# Patient Record
Sex: FEMALE | Race: WHITE | NOT HISPANIC OR LATINO | Employment: OTHER | URBAN - METROPOLITAN AREA
[De-identification: names, ages, dates, MRNs, and addresses within clinical notes are randomized per-mention and may not be internally consistent; named-entity substitution may affect disease eponyms.]

---

## 2017-01-17 ENCOUNTER — APPOINTMENT (EMERGENCY)
Dept: RADIOLOGY | Facility: HOSPITAL | Age: 82
DRG: 242 | End: 2017-01-17
Payer: MEDICARE

## 2017-01-17 ENCOUNTER — APPOINTMENT (INPATIENT)
Dept: RADIOLOGY | Facility: HOSPITAL | Age: 82
DRG: 242 | End: 2017-01-17
Payer: MEDICARE

## 2017-01-17 ENCOUNTER — HOSPITAL ENCOUNTER (INPATIENT)
Facility: HOSPITAL | Age: 82
LOS: 7 days | Discharge: NURSING, REHAB OR LONG-TERM CARE | DRG: 242 | End: 2017-01-24
Attending: SURGERY | Admitting: SURGERY
Payer: MEDICARE

## 2017-01-17 ENCOUNTER — APPOINTMENT (INPATIENT)
Dept: NEUROLOGY | Facility: AMBULATORY SURGERY CENTER | Age: 82
DRG: 242 | End: 2017-01-17
Payer: MEDICARE

## 2017-01-17 DIAGNOSIS — R40.4 TRANSIENT ALTERATION OF AWARENESS: ICD-10-CM

## 2017-01-17 DIAGNOSIS — Z95.0 STATUS POST PLACEMENT OF CARDIAC PACEMAKER: ICD-10-CM

## 2017-01-17 DIAGNOSIS — M62.82 RHABDOMYOLYSIS: ICD-10-CM

## 2017-01-17 DIAGNOSIS — R00.1 BRADYCARDIA: ICD-10-CM

## 2017-01-17 DIAGNOSIS — R41.82 ALTERED MENTAL STATUS: Primary | ICD-10-CM

## 2017-01-17 DIAGNOSIS — R77.8 ELEVATED TROPONIN: ICD-10-CM

## 2017-01-17 DIAGNOSIS — R41.82 ALTERED MENTAL STATUS, UNSPECIFIED ALTERED MENTAL STATUS TYPE: ICD-10-CM

## 2017-01-17 PROBLEM — I10 ACCELERATED HYPERTENSION: Status: ACTIVE | Noted: 2017-01-17

## 2017-01-17 LAB
ALBUMIN SERPL BCP-MCNC: 3.2 G/DL (ref 3.5–5)
ALP SERPL-CCNC: 154 U/L (ref 46–116)
ALT SERPL W P-5'-P-CCNC: 37 U/L (ref 12–78)
AMMONIA PLAS-SCNC: 31 UMOL/L (ref 11–35)
ANION GAP SERPL CALCULATED.3IONS-SCNC: 12 MMOL/L (ref 4–13)
APAP SERPL-MCNC: <2 UG/ML (ref 10–30)
APTT PPP: 25 SECONDS (ref 24–36)
ARTERIAL PATENCY WRIST A: YES
AST SERPL W P-5'-P-CCNC: 61 U/L (ref 5–45)
ATRIAL RATE: 75 BPM
BACTERIA UR QL AUTO: ABNORMAL /HPF
BASE EXCESS BLDA CALC-SCNC: -3.6 MMOL/L
BASE EXCESS BLDA CALC-SCNC: -4 MMOL/L (ref -2–3)
BASOPHILS # BLD AUTO: 0.01 THOUSANDS/ΜL (ref 0–0.1)
BASOPHILS # BLD AUTO: 0.02 THOUSANDS/ΜL (ref 0–0.1)
BASOPHILS NFR BLD AUTO: 0 % (ref 0–1)
BASOPHILS NFR BLD AUTO: 0 % (ref 0–1)
BILIRUB SERPL-MCNC: 0.67 MG/DL (ref 0.2–1)
BILIRUB UR QL STRIP: NEGATIVE
BUN SERPL-MCNC: 14 MG/DL (ref 5–25)
CA-I BLD-SCNC: 0.95 MMOL/L (ref 1.12–1.32)
CALCIUM SERPL-MCNC: 8.3 MG/DL (ref 8.3–10.1)
CHLORIDE SERPL-SCNC: 98 MMOL/L (ref 100–108)
CK MB SERPL-MCNC: 1.4 % (ref 0–2.5)
CK MB SERPL-MCNC: 14 NG/ML (ref 0–5)
CK MB SERPL-MCNC: 18.6 NG/ML (ref 0–5)
CK MB SERPL-MCNC: <1 % (ref 0–2.5)
CK SERPL-CCNC: 2710 U/L (ref 26–192)
CK SERPL-CCNC: 997 U/L (ref 26–192)
CLARITY UR: ABNORMAL
CO2 SERPL-SCNC: 23 MMOL/L (ref 21–32)
COLOR UR: YELLOW
CREAT SERPL-MCNC: 0.81 MG/DL (ref 0.6–1.3)
EOSINOPHIL # BLD AUTO: 0 THOUSAND/ΜL (ref 0–0.61)
EOSINOPHIL # BLD AUTO: 0 THOUSAND/ΜL (ref 0–0.61)
EOSINOPHIL NFR BLD AUTO: 0 % (ref 0–6)
EOSINOPHIL NFR BLD AUTO: 0 % (ref 0–6)
ERYTHROCYTE [DISTWIDTH] IN BLOOD BY AUTOMATED COUNT: 13.3 % (ref 11.6–15.1)
ERYTHROCYTE [DISTWIDTH] IN BLOOD BY AUTOMATED COUNT: 13.4 % (ref 11.6–15.1)
ETHANOL SERPL-MCNC: <3 MG/DL (ref 0–3)
GFR SERPL CREATININE-BSD FRML MDRD: >60 ML/MIN/1.73SQ M
GLUCOSE SERPL-MCNC: 103 MG/DL (ref 65–140)
GLUCOSE SERPL-MCNC: 116 MG/DL (ref 65–140)
GLUCOSE UR STRIP-MCNC: NEGATIVE MG/DL
HCO3 BLDA-SCNC: 20 MMOL/L (ref 24–30)
HCO3 BLDA-SCNC: 20.5 MMOL/L (ref 22–28)
HCT VFR BLD AUTO: 34.4 % (ref 34.8–46.1)
HCT VFR BLD AUTO: 35.8 % (ref 34.8–46.1)
HCT VFR BLD CALC: 33 % (ref 34.8–46.1)
HGB BLD-MCNC: 12.1 G/DL (ref 11.5–15.4)
HGB BLD-MCNC: 12.5 G/DL (ref 11.5–15.4)
HGB BLDA-MCNC: 11.2 G/DL (ref 11.5–15.4)
HGB UR QL STRIP.AUTO: ABNORMAL
HOLD SPECIMEN: NORMAL
HOROWITZ INDEX BLDA+IHG-RTO: 50 MM[HG]
INR PPP: 1.02 (ref 0.86–1.16)
KETONES UR STRIP-MCNC: ABNORMAL MG/DL
LACTATE SERPL-SCNC: 1.5 MMOL/L (ref 0.5–2)
LACTATE SERPL-SCNC: 2.1 MMOL/L (ref 0.5–2)
LEUKOCYTE ESTERASE UR QL STRIP: NEGATIVE
LYMPHOCYTES # BLD AUTO: 0.75 THOUSANDS/ΜL (ref 0.6–4.47)
LYMPHOCYTES # BLD AUTO: 1.07 THOUSANDS/ΜL (ref 0.6–4.47)
LYMPHOCYTES NFR BLD AUTO: 10 % (ref 14–44)
LYMPHOCYTES NFR BLD AUTO: 7 % (ref 14–44)
MCH RBC QN AUTO: 31.9 PG (ref 26.8–34.3)
MCH RBC QN AUTO: 31.9 PG (ref 26.8–34.3)
MCHC RBC AUTO-ENTMCNC: 34.9 G/DL (ref 31.4–37.4)
MCHC RBC AUTO-ENTMCNC: 35.2 G/DL (ref 31.4–37.4)
MCV RBC AUTO: 91 FL (ref 82–98)
MCV RBC AUTO: 91 FL (ref 82–98)
MONOCYTES # BLD AUTO: 0.72 THOUSAND/ΜL (ref 0.17–1.22)
MONOCYTES # BLD AUTO: 1.17 THOUSAND/ΜL (ref 0.17–1.22)
MONOCYTES NFR BLD AUTO: 10 % (ref 4–12)
MONOCYTES NFR BLD AUTO: 7 % (ref 4–12)
NEUTROPHILS # BLD AUTO: 8.66 THOUSANDS/ΜL (ref 1.85–7.62)
NEUTROPHILS # BLD AUTO: 8.94 THOUSANDS/ΜL (ref 1.85–7.62)
NEUTS SEG NFR BLD AUTO: 80 % (ref 43–75)
NEUTS SEG NFR BLD AUTO: 86 % (ref 43–75)
NITRITE UR QL STRIP: NEGATIVE
NON-SQ EPI CELLS URNS QL MICRO: ABNORMAL /HPF
NRBC BLD AUTO-RTO: 0 /100 WBCS
NRBC BLD AUTO-RTO: 0 /100 WBCS
O2 CT BLDA-SCNC: 18.8 ML/DL (ref 16–23)
OXYHGB MFR BLDA: 98 % (ref 94–97)
P AXIS: 60 DEGREES
PCO2 BLD: 21 MMOL/L (ref 21–32)
PCO2 BLD: 34 MM HG (ref 42–50)
PCO2 BLDA: 34.4 MM HG (ref 36–44)
PEEP RESPIRATORY: 5 CM[H2O]
PH BLD: 7.38 [PH] (ref 7.3–7.4)
PH BLDA: 7.39 [PH] (ref 7.35–7.45)
PH UR STRIP.AUTO: 7.5 [PH] (ref 4.5–8)
PLATELET # BLD AUTO: 315 THOUSANDS/UL (ref 149–390)
PLATELET # BLD AUTO: 344 THOUSANDS/UL (ref 149–390)
PLATELET # BLD AUTO: 347 THOUSANDS/UL (ref 149–390)
PMV BLD AUTO: 8.8 FL (ref 8.9–12.7)
PMV BLD AUTO: 9.1 FL (ref 8.9–12.7)
PMV BLD AUTO: 9.5 FL (ref 8.9–12.7)
PO2 BLD: 44 MM HG (ref 35–45)
PO2 BLDA: 141.5 MM HG (ref 75–129)
POTASSIUM BLD-SCNC: 3.9 MMOL/L (ref 3.5–5.3)
POTASSIUM SERPL-SCNC: 3.4 MMOL/L (ref 3.5–5.3)
PR INTERVAL: 202 MS
PROLACTIN SERPL-MCNC: 33.4 NG/ML
PROT SERPL-MCNC: 6.9 G/DL (ref 6.4–8.2)
PROT UR STRIP-MCNC: ABNORMAL MG/DL
PROTHROMBIN TIME: 13.5 SECONDS (ref 12–14.3)
QRS AXIS: 77 DEGREES
QRSD INTERVAL: 90 MS
QT INTERVAL: 390 MS
QTC INTERVAL: 435 MS
RBC # BLD AUTO: 3.79 MILLION/UL (ref 3.81–5.12)
RBC # BLD AUTO: 3.92 MILLION/UL (ref 3.81–5.12)
RBC #/AREA URNS AUTO: ABNORMAL /HPF
SALICYLATES SERPL-MCNC: <3 MG/DL (ref 3–20)
SAO2 % BLD FROM PO2: 79 % (ref 95–98)
SODIUM BLD-SCNC: 133 MMOL/L (ref 136–145)
SODIUM SERPL-SCNC: 133 MMOL/L (ref 136–145)
SP GR UR STRIP.AUTO: 1.01 (ref 1–1.03)
SPECIMEN SOURCE: ABNORMAL
SPECIMEN SOURCE: ABNORMAL
SPECIMEN SOURCE: NORMAL
T WAVE AXIS: 45 DEGREES
TROPONIN I BLD-MCNC: 0.04 NG/ML (ref 0–0.08)
TROPONIN I SERPL-MCNC: 0.05 NG/ML
TROPONIN I SERPL-MCNC: 0.17 NG/ML
TSH SERPL DL<=0.05 MIU/L-ACNC: 3.49 UIU/ML (ref 0.36–3.74)
UROBILINOGEN UR QL STRIP.AUTO: 0.2 E.U./DL
VENT AC: 12
VENT- AC: AC
VENTRICULAR RATE: 75 BPM
VT SETTING VENT: 400 ML
WBC # BLD AUTO: 10.17 THOUSAND/UL (ref 4.31–10.16)
WBC # BLD AUTO: 11.22 THOUSAND/UL (ref 4.31–10.16)
WBC #/AREA URNS AUTO: ABNORMAL /HPF

## 2017-01-17 PROCEDURE — 94002 VENT MGMT INPAT INIT DAY: CPT

## 2017-01-17 PROCEDURE — C9113 INJ PANTOPRAZOLE SODIUM, VIA: HCPCS | Performed by: PHYSICIAN ASSISTANT

## 2017-01-17 PROCEDURE — 83605 ASSAY OF LACTIC ACID: CPT | Performed by: EMERGENCY MEDICINE

## 2017-01-17 PROCEDURE — 83605 ASSAY OF LACTIC ACID: CPT | Performed by: PHYSICIAN ASSISTANT

## 2017-01-17 PROCEDURE — 85025 COMPLETE CBC W/AUTO DIFF WBC: CPT | Performed by: SURGERY

## 2017-01-17 PROCEDURE — 36415 COLL VENOUS BLD VENIPUNCTURE: CPT | Performed by: EMERGENCY MEDICINE

## 2017-01-17 PROCEDURE — 84484 ASSAY OF TROPONIN QUANT: CPT | Performed by: SURGERY

## 2017-01-17 PROCEDURE — 72125 CT NECK SPINE W/O DYE: CPT

## 2017-01-17 PROCEDURE — 80053 COMPREHEN METABOLIC PANEL: CPT | Performed by: PHYSICIAN ASSISTANT

## 2017-01-17 PROCEDURE — 87086 URINE CULTURE/COLONY COUNT: CPT | Performed by: PHYSICIAN ASSISTANT

## 2017-01-17 PROCEDURE — 82803 BLOOD GASES ANY COMBINATION: CPT

## 2017-01-17 PROCEDURE — 84443 ASSAY THYROID STIM HORMONE: CPT | Performed by: PHYSICIAN ASSISTANT

## 2017-01-17 PROCEDURE — 84295 ASSAY OF SERUM SODIUM: CPT

## 2017-01-17 PROCEDURE — 73560 X-RAY EXAM OF KNEE 1 OR 2: CPT

## 2017-01-17 PROCEDURE — 82553 CREATINE MB FRACTION: CPT | Performed by: PHYSICIAN ASSISTANT

## 2017-01-17 PROCEDURE — 82947 ASSAY GLUCOSE BLOOD QUANT: CPT

## 2017-01-17 PROCEDURE — 80329 ANALGESICS NON-OPIOID 1 OR 2: CPT | Performed by: PHYSICIAN ASSISTANT

## 2017-01-17 PROCEDURE — 85049 AUTOMATED PLATELET COUNT: CPT | Performed by: EMERGENCY MEDICINE

## 2017-01-17 PROCEDURE — 73020 X-RAY EXAM OF SHOULDER: CPT

## 2017-01-17 PROCEDURE — 82550 ASSAY OF CK (CPK): CPT | Performed by: PHYSICIAN ASSISTANT

## 2017-01-17 PROCEDURE — 82330 ASSAY OF CALCIUM: CPT

## 2017-01-17 PROCEDURE — 93005 ELECTROCARDIOGRAM TRACING: CPT | Performed by: SURGERY

## 2017-01-17 PROCEDURE — 82140 ASSAY OF AMMONIA: CPT | Performed by: PHYSICIAN ASSISTANT

## 2017-01-17 PROCEDURE — 84484 ASSAY OF TROPONIN QUANT: CPT

## 2017-01-17 PROCEDURE — 84132 ASSAY OF SERUM POTASSIUM: CPT

## 2017-01-17 PROCEDURE — 84146 ASSAY OF PROLACTIN: CPT | Performed by: PHYSICIAN ASSISTANT

## 2017-01-17 PROCEDURE — 94760 N-INVAS EAR/PLS OXIMETRY 1: CPT

## 2017-01-17 PROCEDURE — 85014 HEMATOCRIT: CPT

## 2017-01-17 PROCEDURE — G0390 TRAUMA RESPONS W/HOSP CRITI: HCPCS

## 2017-01-17 PROCEDURE — 0BH17EZ INSERTION OF ENDOTRACHEAL AIRWAY INTO TRACHEA, VIA NATURAL OR ARTIFICIAL OPENING: ICD-10-PCS | Performed by: EMERGENCY MEDICINE

## 2017-01-17 PROCEDURE — 80320 DRUG SCREEN QUANTALCOHOLS: CPT | Performed by: PHYSICIAN ASSISTANT

## 2017-01-17 PROCEDURE — 99291 CRITICAL CARE FIRST HOUR: CPT

## 2017-01-17 PROCEDURE — 87040 BLOOD CULTURE FOR BACTERIA: CPT | Performed by: PHYSICIAN ASSISTANT

## 2017-01-17 PROCEDURE — 82550 ASSAY OF CK (CPK): CPT | Performed by: SURGERY

## 2017-01-17 PROCEDURE — 85610 PROTHROMBIN TIME: CPT | Performed by: SURGERY

## 2017-01-17 PROCEDURE — 81001 URINALYSIS AUTO W/SCOPE: CPT | Performed by: EMERGENCY MEDICINE

## 2017-01-17 PROCEDURE — 96374 THER/PROPH/DIAG INJ IV PUSH: CPT

## 2017-01-17 PROCEDURE — 95951 HB EEG MONITORING/VIDEORECORD: CPT

## 2017-01-17 PROCEDURE — 70450 CT HEAD/BRAIN W/O DYE: CPT

## 2017-01-17 PROCEDURE — 85025 COMPLETE CBC W/AUTO DIFF WBC: CPT | Performed by: PHYSICIAN ASSISTANT

## 2017-01-17 PROCEDURE — 82805 BLOOD GASES W/O2 SATURATION: CPT | Performed by: PHYSICIAN ASSISTANT

## 2017-01-17 PROCEDURE — 93005 ELECTROCARDIOGRAM TRACING: CPT | Performed by: EMERGENCY MEDICINE

## 2017-01-17 PROCEDURE — 71010 HB CHEST X-RAY 1 VIEW FRONTAL: CPT

## 2017-01-17 PROCEDURE — 84484 ASSAY OF TROPONIN QUANT: CPT | Performed by: PHYSICIAN ASSISTANT

## 2017-01-17 PROCEDURE — 85730 THROMBOPLASTIN TIME PARTIAL: CPT | Performed by: SURGERY

## 2017-01-17 PROCEDURE — 5A1935Z RESPIRATORY VENTILATION, LESS THAN 24 CONSECUTIVE HOURS: ICD-10-PCS | Performed by: EMERGENCY MEDICINE

## 2017-01-17 PROCEDURE — 82553 CREATINE MB FRACTION: CPT | Performed by: SURGERY

## 2017-01-17 RX ORDER — LABETALOL HYDROCHLORIDE 5 MG/ML
10 INJECTION, SOLUTION INTRAVENOUS ONCE
Status: COMPLETED | OUTPATIENT
Start: 2017-01-17 | End: 2017-01-17

## 2017-01-17 RX ORDER — PROPOFOL 10 MG/ML
5-50 INJECTION, EMULSION INTRAVENOUS
Status: DISCONTINUED | OUTPATIENT
Start: 2017-01-17 | End: 2017-01-17

## 2017-01-17 RX ORDER — SODIUM CHLORIDE 9 MG/ML
100 INJECTION, SOLUTION INTRAVENOUS CONTINUOUS
Status: DISCONTINUED | OUTPATIENT
Start: 2017-01-17 | End: 2017-01-18

## 2017-01-17 RX ORDER — SODIUM CHLORIDE, SODIUM GLUCONATE, SODIUM ACETATE, POTASSIUM CHLORIDE, MAGNESIUM CHLORIDE, SODIUM PHOSPHATE, DIBASIC, AND POTASSIUM PHOSPHATE .53; .5; .37; .037; .03; .012; .00082 G/100ML; G/100ML; G/100ML; G/100ML; G/100ML; G/100ML; G/100ML
500 INJECTION, SOLUTION INTRAVENOUS ONCE
Status: COMPLETED | OUTPATIENT
Start: 2017-01-17 | End: 2017-01-17

## 2017-01-17 RX ORDER — SODIUM CHLORIDE 3 G/100ML
250 INJECTION, SOLUTION INTRAVENOUS ONCE
Status: DISCONTINUED | OUTPATIENT
Start: 2017-01-17 | End: 2017-01-17

## 2017-01-17 RX ORDER — FENTANYL CITRATE 50 UG/ML
50 INJECTION, SOLUTION INTRAMUSCULAR; INTRAVENOUS ONCE
Status: COMPLETED | OUTPATIENT
Start: 2017-01-17 | End: 2017-01-17

## 2017-01-17 RX ORDER — CHLORHEXIDINE GLUCONATE 0.12 MG/ML
15 RINSE ORAL EVERY 12 HOURS SCHEDULED
Status: DISCONTINUED | OUTPATIENT
Start: 2017-01-17 | End: 2017-01-18

## 2017-01-17 RX ORDER — 3% SODIUM CHLORIDE 3 G/100ML
30 INJECTION, SOLUTION INTRAVENOUS CONTINUOUS
Status: DISCONTINUED | OUTPATIENT
Start: 2017-01-17 | End: 2017-01-17

## 2017-01-17 RX ORDER — HYDRALAZINE HYDROCHLORIDE 20 MG/ML
10 INJECTION INTRAMUSCULAR; INTRAVENOUS EVERY 6 HOURS PRN
Status: DISCONTINUED | OUTPATIENT
Start: 2017-01-17 | End: 2017-01-24 | Stop reason: HOSPADM

## 2017-01-17 RX ORDER — PROPOFOL 10 MG/ML
INJECTION, EMULSION INTRAVENOUS
Status: COMPLETED | OUTPATIENT
Start: 2017-01-17 | End: 2017-01-17

## 2017-01-17 RX ORDER — PANTOPRAZOLE SODIUM 40 MG/1
40 INJECTION, POWDER, FOR SOLUTION INTRAVENOUS DAILY
Status: DISCONTINUED | OUTPATIENT
Start: 2017-01-17 | End: 2017-01-19

## 2017-01-17 RX ORDER — ROCURONIUM BROMIDE 10 MG/ML
60 INJECTION, SOLUTION INTRAVENOUS ONCE
Status: COMPLETED | OUTPATIENT
Start: 2017-01-17 | End: 2017-01-17

## 2017-01-17 RX ORDER — POLYVINYL ALCOHOL 14 MG/ML
1 SOLUTION/ DROPS OPHTHALMIC
Status: DISCONTINUED | OUTPATIENT
Start: 2017-01-17 | End: 2017-01-24 | Stop reason: HOSPADM

## 2017-01-17 RX ORDER — HEPARIN SODIUM 5000 [USP'U]/ML
5000 INJECTION, SOLUTION INTRAVENOUS; SUBCUTANEOUS EVERY 8 HOURS SCHEDULED
Status: COMPLETED | OUTPATIENT
Start: 2017-01-18 | End: 2017-01-18

## 2017-01-17 RX ORDER — POTASSIUM CHLORIDE 14.9 MG/ML
20 INJECTION INTRAVENOUS ONCE
Status: COMPLETED | OUTPATIENT
Start: 2017-01-17 | End: 2017-01-17

## 2017-01-17 RX ORDER — ETOMIDATE 2 MG/ML
INJECTION INTRAVENOUS CODE/TRAUMA/SEDATION MEDICATION
Status: COMPLETED | OUTPATIENT
Start: 2017-01-17 | End: 2017-01-17

## 2017-01-17 RX ORDER — POTASSIUM CHLORIDE 14.9 MG/ML
20 INJECTION INTRAVENOUS ONCE
Status: COMPLETED | OUTPATIENT
Start: 2017-01-17 | End: 2017-01-18

## 2017-01-17 RX ADMIN — POTASSIUM CHLORIDE 20 MEQ: 200 INJECTION, SOLUTION INTRAVENOUS at 21:43

## 2017-01-17 RX ADMIN — POTASSIUM CHLORIDE 20 MEQ: 200 INJECTION, SOLUTION INTRAVENOUS at 23:08

## 2017-01-17 RX ADMIN — LABETALOL HYDROCHLORIDE 10 MG: 5 INJECTION, SOLUTION INTRAVENOUS at 11:53

## 2017-01-17 RX ADMIN — ROCURONIUM BROMIDE 60 MG: 10 INJECTION, SOLUTION INTRAVENOUS at 11:03

## 2017-01-17 RX ADMIN — SODIUM CHLORIDE 75 ML/HR: 0.9 INJECTION, SOLUTION INTRAVENOUS at 19:23

## 2017-01-17 RX ADMIN — FENTANYL CITRATE 50 MCG: 50 INJECTION INTRAMUSCULAR; INTRAVENOUS at 11:32

## 2017-01-17 RX ADMIN — DEXMEDETOMIDINE HYDROCHLORIDE 0.2 MCG/KG/HR: 100 INJECTION, SOLUTION INTRAVENOUS at 20:54

## 2017-01-17 RX ADMIN — PROPOFOL 10 MCG/KG/MIN: 10 INJECTION, EMULSION INTRAVENOUS at 11:53

## 2017-01-17 RX ADMIN — SODIUM CHLORIDE, SODIUM GLUCONATE, SODIUM ACETATE, POTASSIUM CHLORIDE AND MAGNESIUM CHLORIDE 500 ML: 526; 502; 368; 37; 30 INJECTION, SOLUTION INTRAVENOUS at 17:53

## 2017-01-17 RX ADMIN — ETOMIDATE 18 MG: 2 INJECTION, SOLUTION INTRAVENOUS at 11:00

## 2017-01-17 RX ADMIN — PROPOFOL 10 MCG/KG/MIN: 10 INJECTION, EMULSION INTRAVENOUS at 12:15

## 2017-01-17 RX ADMIN — CHLORHEXIDINE GLUCONATE 15 ML: 1.2 RINSE ORAL at 20:17

## 2017-01-17 RX ADMIN — PROPOFOL 5 MCG/KG/MIN: 10 INJECTION, EMULSION INTRAVENOUS at 11:11

## 2017-01-17 RX ADMIN — PANTOPRAZOLE SODIUM 40 MG: 40 INJECTION, POWDER, FOR SOLUTION INTRAVENOUS at 16:43

## 2017-01-18 ENCOUNTER — APPOINTMENT (INPATIENT)
Dept: RADIOLOGY | Facility: HOSPITAL | Age: 82
DRG: 242 | End: 2017-01-18
Payer: MEDICARE

## 2017-01-18 ENCOUNTER — GENERIC CONVERSION - ENCOUNTER (OUTPATIENT)
Dept: OTHER | Facility: OTHER | Age: 82
End: 2017-01-18

## 2017-01-18 ENCOUNTER — APPOINTMENT (INPATIENT)
Dept: NEUROLOGY | Facility: AMBULATORY SURGERY CENTER | Age: 82
DRG: 242 | End: 2017-01-18
Payer: MEDICARE

## 2017-01-18 ENCOUNTER — ANESTHESIA EVENT (INPATIENT)
Dept: NON INVASIVE DIAGNOSTICS | Facility: HOSPITAL | Age: 82
DRG: 242 | End: 2017-01-18
Payer: MEDICARE

## 2017-01-18 PROBLEM — R00.1 BRADYCARDIA: Status: ACTIVE | Noted: 2017-01-18

## 2017-01-18 PROBLEM — R41.82 ALTERED MENTAL STATUS: Status: RESOLVED | Noted: 2017-01-17 | Resolved: 2017-01-18

## 2017-01-18 LAB
ANION GAP SERPL CALCULATED.3IONS-SCNC: 9 MMOL/L (ref 4–13)
ANION GAP SERPL CALCULATED.3IONS-SCNC: 9 MMOL/L (ref 4–13)
BACTERIA UR CULT: NORMAL
BASOPHILS # BLD AUTO: 0.01 THOUSANDS/ΜL (ref 0–0.1)
BASOPHILS NFR BLD AUTO: 0 % (ref 0–1)
BUN SERPL-MCNC: 12 MG/DL (ref 5–25)
BUN SERPL-MCNC: 13 MG/DL (ref 5–25)
CALCIUM SERPL-MCNC: 8 MG/DL (ref 8.3–10.1)
CALCIUM SERPL-MCNC: 8.1 MG/DL (ref 8.3–10.1)
CHLORIDE SERPL-SCNC: 102 MMOL/L (ref 100–108)
CHLORIDE SERPL-SCNC: 103 MMOL/L (ref 100–108)
CK MB SERPL-MCNC: 17.3 NG/ML (ref 0–5)
CK MB SERPL-MCNC: 19.8 NG/ML (ref 0–5)
CK MB SERPL-MCNC: <1 % (ref 0–2.5)
CK MB SERPL-MCNC: <1 % (ref 0–2.5)
CK SERPL-CCNC: 2759 U/L (ref 26–192)
CK SERPL-CCNC: 2814 U/L (ref 26–192)
CO2 SERPL-SCNC: 22 MMOL/L (ref 21–32)
CO2 SERPL-SCNC: 24 MMOL/L (ref 21–32)
CREAT SERPL-MCNC: 0.62 MG/DL (ref 0.6–1.3)
CREAT SERPL-MCNC: 0.72 MG/DL (ref 0.6–1.3)
EOSINOPHIL # BLD AUTO: 0.01 THOUSAND/ΜL (ref 0–0.61)
EOSINOPHIL NFR BLD AUTO: 0 % (ref 0–6)
ERYTHROCYTE [DISTWIDTH] IN BLOOD BY AUTOMATED COUNT: 13.7 % (ref 11.6–15.1)
GFR SERPL CREATININE-BSD FRML MDRD: >60 ML/MIN/1.73SQ M
GFR SERPL CREATININE-BSD FRML MDRD: >60 ML/MIN/1.73SQ M
GLUCOSE SERPL-MCNC: 104 MG/DL (ref 65–140)
GLUCOSE SERPL-MCNC: 91 MG/DL (ref 65–140)
HCT VFR BLD AUTO: 31.9 % (ref 34.8–46.1)
HGB BLD-MCNC: 11 G/DL (ref 11.5–15.4)
LACTATE SERPL-SCNC: 1.1 MMOL/L (ref 0.5–2)
LYMPHOCYTES # BLD AUTO: 1.12 THOUSANDS/ΜL (ref 0.6–4.47)
LYMPHOCYTES NFR BLD AUTO: 11 % (ref 14–44)
MAGNESIUM SERPL-MCNC: 3.5 MG/DL (ref 1.6–2.6)
MCH RBC QN AUTO: 31.8 PG (ref 26.8–34.3)
MCHC RBC AUTO-ENTMCNC: 34.5 G/DL (ref 31.4–37.4)
MCV RBC AUTO: 92 FL (ref 82–98)
MONOCYTES # BLD AUTO: 1.24 THOUSAND/ΜL (ref 0.17–1.22)
MONOCYTES NFR BLD AUTO: 12 % (ref 4–12)
NEUTROPHILS # BLD AUTO: 8.18 THOUSANDS/ΜL (ref 1.85–7.62)
NEUTS SEG NFR BLD AUTO: 77 % (ref 43–75)
NRBC BLD AUTO-RTO: 0 /100 WBCS
PHOSPHATE SERPL-MCNC: 2.3 MG/DL (ref 2.3–4.1)
PLATELET # BLD AUTO: 300 THOUSANDS/UL (ref 149–390)
PMV BLD AUTO: 9.3 FL (ref 8.9–12.7)
POTASSIUM SERPL-SCNC: 3.5 MMOL/L (ref 3.5–5.3)
POTASSIUM SERPL-SCNC: 4 MMOL/L (ref 3.5–5.3)
RBC # BLD AUTO: 3.46 MILLION/UL (ref 3.81–5.12)
SODIUM SERPL-SCNC: 134 MMOL/L (ref 136–145)
SODIUM SERPL-SCNC: 135 MMOL/L (ref 136–145)
TROPONIN I SERPL-MCNC: 0.13 NG/ML
WBC # BLD AUTO: 10.58 THOUSAND/UL (ref 4.31–10.16)

## 2017-01-18 PROCEDURE — 82553 CREATINE MB FRACTION: CPT | Performed by: EMERGENCY MEDICINE

## 2017-01-18 PROCEDURE — 73130 X-RAY EXAM OF HAND: CPT

## 2017-01-18 PROCEDURE — 95951 HB EEG MONITORING/VIDEORECORD: CPT

## 2017-01-18 PROCEDURE — 84484 ASSAY OF TROPONIN QUANT: CPT | Performed by: PHYSICIAN ASSISTANT

## 2017-01-18 PROCEDURE — 83605 ASSAY OF LACTIC ACID: CPT | Performed by: PHYSICIAN ASSISTANT

## 2017-01-18 PROCEDURE — 93306 TTE W/DOPPLER COMPLETE: CPT

## 2017-01-18 PROCEDURE — 84100 ASSAY OF PHOSPHORUS: CPT | Performed by: EMERGENCY MEDICINE

## 2017-01-18 PROCEDURE — 82550 ASSAY OF CK (CPK): CPT | Performed by: PHYSICIAN ASSISTANT

## 2017-01-18 PROCEDURE — C9113 INJ PANTOPRAZOLE SODIUM, VIA: HCPCS | Performed by: PHYSICIAN ASSISTANT

## 2017-01-18 PROCEDURE — 83735 ASSAY OF MAGNESIUM: CPT | Performed by: EMERGENCY MEDICINE

## 2017-01-18 PROCEDURE — 92610 EVALUATE SWALLOWING FUNCTION: CPT

## 2017-01-18 PROCEDURE — 85025 COMPLETE CBC W/AUTO DIFF WBC: CPT | Performed by: EMERGENCY MEDICINE

## 2017-01-18 PROCEDURE — 71010 HB CHEST X-RAY 1 VIEW FRONTAL (PORTABLE): CPT

## 2017-01-18 PROCEDURE — 82550 ASSAY OF CK (CPK): CPT | Performed by: EMERGENCY MEDICINE

## 2017-01-18 PROCEDURE — 80048 BASIC METABOLIC PNL TOTAL CA: CPT | Performed by: EMERGENCY MEDICINE

## 2017-01-18 PROCEDURE — 94760 N-INVAS EAR/PLS OXIMETRY 1: CPT

## 2017-01-18 PROCEDURE — 82553 CREATINE MB FRACTION: CPT | Performed by: PHYSICIAN ASSISTANT

## 2017-01-18 RX ORDER — MAGNESIUM SULFATE HEPTAHYDRATE 40 MG/ML
2 INJECTION, SOLUTION INTRAVENOUS ONCE
Status: COMPLETED | OUTPATIENT
Start: 2017-01-18 | End: 2017-01-18

## 2017-01-18 RX ORDER — POTASSIUM CHLORIDE 14.9 MG/ML
20 INJECTION INTRAVENOUS ONCE
Status: COMPLETED | OUTPATIENT
Start: 2017-01-18 | End: 2017-01-18

## 2017-01-18 RX ORDER — POTASSIUM CHLORIDE 20 MEQ/1
40 TABLET, EXTENDED RELEASE ORAL ONCE
Status: COMPLETED | OUTPATIENT
Start: 2017-01-18 | End: 2017-01-18

## 2017-01-18 RX ORDER — SODIUM CHLORIDE 9 MG/ML
100 INJECTION, SOLUTION INTRAVENOUS CONTINUOUS
Status: DISCONTINUED | OUTPATIENT
Start: 2017-01-18 | End: 2017-01-19

## 2017-01-18 RX ORDER — SODIUM CHLORIDE, SODIUM GLUCONATE, SODIUM ACETATE, POTASSIUM CHLORIDE, MAGNESIUM CHLORIDE, SODIUM PHOSPHATE, DIBASIC, AND POTASSIUM PHOSPHATE .53; .5; .37; .037; .03; .012; .00082 G/100ML; G/100ML; G/100ML; G/100ML; G/100ML; G/100ML; G/100ML
100 INJECTION, SOLUTION INTRAVENOUS CONTINUOUS
Status: DISCONTINUED | OUTPATIENT
Start: 2017-01-18 | End: 2017-01-18

## 2017-01-18 RX ORDER — ACETAMINOPHEN 325 MG/1
650 TABLET ORAL EVERY 6 HOURS PRN
Status: DISCONTINUED | OUTPATIENT
Start: 2017-01-18 | End: 2017-01-18

## 2017-01-18 RX ORDER — AMLODIPINE BESYLATE 5 MG/1
5 TABLET ORAL DAILY
COMMUNITY
End: 2017-06-16

## 2017-01-18 RX ORDER — ACETAMINOPHEN 325 MG/1
650 TABLET ORAL EVERY 6 HOURS PRN
Status: DISCONTINUED | OUTPATIENT
Start: 2017-01-18 | End: 2017-01-24 | Stop reason: HOSPADM

## 2017-01-18 RX ADMIN — POTASSIUM CHLORIDE 20 MEQ: 200 INJECTION, SOLUTION INTRAVENOUS at 17:24

## 2017-01-18 RX ADMIN — SODIUM CHLORIDE 500 ML: 0.9 INJECTION, SOLUTION INTRAVENOUS at 09:05

## 2017-01-18 RX ADMIN — HYDRALAZINE HYDROCHLORIDE 10 MG: 20 INJECTION INTRAMUSCULAR; INTRAVENOUS at 15:15

## 2017-01-18 RX ADMIN — POTASSIUM CHLORIDE 40 MEQ: 1500 TABLET, EXTENDED RELEASE ORAL at 17:24

## 2017-01-18 RX ADMIN — MAGNESIUM SULFATE HEPTAHYDRATE 2 G: 40 INJECTION, SOLUTION INTRAVENOUS at 00:44

## 2017-01-18 RX ADMIN — HEPARIN SODIUM 5000 UNITS: 5000 INJECTION, SOLUTION INTRAVENOUS; SUBCUTANEOUS at 22:20

## 2017-01-18 RX ADMIN — HEPARIN SODIUM 5000 UNITS: 5000 INJECTION, SOLUTION INTRAVENOUS; SUBCUTANEOUS at 05:59

## 2017-01-18 RX ADMIN — SODIUM CHLORIDE 100 ML/HR: 0.9 INJECTION, SOLUTION INTRAVENOUS at 09:11

## 2017-01-18 RX ADMIN — DEXMEDETOMIDINE HYDROCHLORIDE 0.2 MCG/KG/HR: 100 INJECTION, SOLUTION INTRAVENOUS at 05:52

## 2017-01-18 RX ADMIN — HEPARIN SODIUM 5000 UNITS: 5000 INJECTION, SOLUTION INTRAVENOUS; SUBCUTANEOUS at 15:06

## 2017-01-18 RX ADMIN — SODIUM CHLORIDE 100 ML/HR: 0.9 INJECTION, SOLUTION INTRAVENOUS at 05:52

## 2017-01-18 RX ADMIN — PANTOPRAZOLE SODIUM 40 MG: 40 INJECTION, POWDER, FOR SOLUTION INTRAVENOUS at 09:37

## 2017-01-18 RX ADMIN — SODIUM CHLORIDE 100 ML/HR: 0.9 INJECTION, SOLUTION INTRAVENOUS at 19:07

## 2017-01-19 ENCOUNTER — ANESTHESIA (INPATIENT)
Dept: NON INVASIVE DIAGNOSTICS | Facility: HOSPITAL | Age: 82
DRG: 242 | End: 2017-01-19
Payer: MEDICARE

## 2017-01-19 ENCOUNTER — APPOINTMENT (INPATIENT)
Dept: NON INVASIVE DIAGNOSTICS | Facility: HOSPITAL | Age: 82
DRG: 242 | End: 2017-01-19
Attending: INTERNAL MEDICINE
Payer: MEDICARE

## 2017-01-19 ENCOUNTER — APPOINTMENT (INPATIENT)
Dept: RADIOLOGY | Facility: HOSPITAL | Age: 82
DRG: 242 | End: 2017-01-19
Attending: INTERNAL MEDICINE
Payer: MEDICARE

## 2017-01-19 ENCOUNTER — GENERIC CONVERSION - ENCOUNTER (OUTPATIENT)
Dept: OTHER | Facility: OTHER | Age: 82
End: 2017-01-19

## 2017-01-19 PROBLEM — R41.82 ALTERED MENTAL STATUS: Status: RESOLVED | Noted: 2017-01-17 | Resolved: 2017-01-19

## 2017-01-19 PROBLEM — Z95.0 STATUS POST PLACEMENT OF CARDIAC PACEMAKER: Status: ACTIVE | Noted: 2017-01-19

## 2017-01-19 PROBLEM — R00.1 BRADYCARDIA: Status: RESOLVED | Noted: 2017-01-18 | Resolved: 2017-01-19

## 2017-01-19 LAB
ALBUMIN SERPL BCP-MCNC: 2.6 G/DL (ref 3.5–5)
ALP SERPL-CCNC: 119 U/L (ref 46–116)
ALT SERPL W P-5'-P-CCNC: 46 U/L (ref 12–78)
ANION GAP SERPL CALCULATED.3IONS-SCNC: 10 MMOL/L (ref 4–13)
AST SERPL W P-5'-P-CCNC: 102 U/L (ref 5–45)
BASOPHILS # BLD AUTO: 0.02 THOUSANDS/ΜL (ref 0–0.1)
BASOPHILS NFR BLD AUTO: 0 % (ref 0–1)
BILIRUB SERPL-MCNC: 0.97 MG/DL (ref 0.2–1)
BUN SERPL-MCNC: 10 MG/DL (ref 5–25)
CALCIUM SERPL-MCNC: 8.1 MG/DL (ref 8.3–10.1)
CHLORIDE SERPL-SCNC: 107 MMOL/L (ref 100–108)
CK MB SERPL-MCNC: 9.6 NG/ML (ref 0–5)
CK MB SERPL-MCNC: <1 % (ref 0–2.5)
CK SERPL-CCNC: 1741 U/L (ref 26–192)
CO2 SERPL-SCNC: 20 MMOL/L (ref 21–32)
CREAT SERPL-MCNC: 0.56 MG/DL (ref 0.6–1.3)
EOSINOPHIL # BLD AUTO: 0.01 THOUSAND/ΜL (ref 0–0.61)
EOSINOPHIL NFR BLD AUTO: 0 % (ref 0–6)
ERYTHROCYTE [DISTWIDTH] IN BLOOD BY AUTOMATED COUNT: 13.8 % (ref 11.6–15.1)
GFR SERPL CREATININE-BSD FRML MDRD: >60 ML/MIN/1.73SQ M
GLUCOSE SERPL-MCNC: 96 MG/DL (ref 65–140)
HCT VFR BLD AUTO: 33.5 % (ref 34.8–46.1)
HGB BLD-MCNC: 11.4 G/DL (ref 11.5–15.4)
LYMPHOCYTES # BLD AUTO: 0.88 THOUSANDS/ΜL (ref 0.6–4.47)
LYMPHOCYTES NFR BLD AUTO: 9 % (ref 14–44)
MAGNESIUM SERPL-MCNC: 2.4 MG/DL (ref 1.6–2.6)
MCH RBC QN AUTO: 31.4 PG (ref 26.8–34.3)
MCHC RBC AUTO-ENTMCNC: 34 G/DL (ref 31.4–37.4)
MCV RBC AUTO: 92 FL (ref 82–98)
MONOCYTES # BLD AUTO: 1.25 THOUSAND/ΜL (ref 0.17–1.22)
MONOCYTES NFR BLD AUTO: 13 % (ref 4–12)
NEUTROPHILS # BLD AUTO: 7.27 THOUSANDS/ΜL (ref 1.85–7.62)
NEUTS SEG NFR BLD AUTO: 78 % (ref 43–75)
NRBC BLD AUTO-RTO: 0 /100 WBCS
PHOSPHATE SERPL-MCNC: 1.5 MG/DL (ref 2.3–4.1)
PLATELET # BLD AUTO: 330 THOUSANDS/UL (ref 149–390)
PMV BLD AUTO: 9.5 FL (ref 8.9–12.7)
POTASSIUM SERPL-SCNC: 4.4 MMOL/L (ref 3.5–5.3)
PROT SERPL-MCNC: 6.1 G/DL (ref 6.4–8.2)
RBC # BLD AUTO: 3.63 MILLION/UL (ref 3.81–5.12)
SODIUM SERPL-SCNC: 137 MMOL/L (ref 136–145)
WBC # BLD AUTO: 9.45 THOUSAND/UL (ref 4.31–10.16)

## 2017-01-19 PROCEDURE — 82553 CREATINE MB FRACTION: CPT | Performed by: EMERGENCY MEDICINE

## 2017-01-19 PROCEDURE — 82550 ASSAY OF CK (CPK): CPT | Performed by: EMERGENCY MEDICINE

## 2017-01-19 PROCEDURE — 92526 ORAL FUNCTION THERAPY: CPT

## 2017-01-19 PROCEDURE — C1769 GUIDE WIRE: HCPCS | Performed by: PHYSICIAN ASSISTANT

## 2017-01-19 PROCEDURE — 80053 COMPREHEN METABOLIC PANEL: CPT | Performed by: EMERGENCY MEDICINE

## 2017-01-19 PROCEDURE — C9113 INJ PANTOPRAZOLE SODIUM, VIA: HCPCS | Performed by: PHYSICIAN ASSISTANT

## 2017-01-19 PROCEDURE — 84100 ASSAY OF PHOSPHORUS: CPT | Performed by: EMERGENCY MEDICINE

## 2017-01-19 PROCEDURE — C1898 LEAD, PMKR, OTHER THAN TRANS: HCPCS

## 2017-01-19 PROCEDURE — C1892 INTRO/SHEATH,FIXED,PEEL-AWAY: HCPCS | Performed by: PHYSICIAN ASSISTANT

## 2017-01-19 PROCEDURE — 85025 COMPLETE CBC W/AUTO DIFF WBC: CPT | Performed by: EMERGENCY MEDICINE

## 2017-01-19 PROCEDURE — 02H63JZ INSERTION OF PACEMAKER LEAD INTO RIGHT ATRIUM, PERCUTANEOUS APPROACH: ICD-10-PCS | Performed by: INTERNAL MEDICINE

## 2017-01-19 PROCEDURE — C1785 PMKR, DUAL, RATE-RESP: HCPCS

## 2017-01-19 PROCEDURE — 33208 INSRT HEART PM ATRIAL & VENT: CPT | Performed by: PHYSICIAN ASSISTANT

## 2017-01-19 PROCEDURE — 71010 HB CHEST X-RAY 1 VIEW FRONTAL (PORTABLE): CPT

## 2017-01-19 PROCEDURE — 83735 ASSAY OF MAGNESIUM: CPT | Performed by: EMERGENCY MEDICINE

## 2017-01-19 PROCEDURE — 0JH606Z INSERTION OF PACEMAKER, DUAL CHAMBER INTO CHEST SUBCUTANEOUS TISSUE AND FASCIA, OPEN APPROACH: ICD-10-PCS | Performed by: INTERNAL MEDICINE

## 2017-01-19 PROCEDURE — 02HK3JZ INSERTION OF PACEMAKER LEAD INTO RIGHT VENTRICLE, PERCUTANEOUS APPROACH: ICD-10-PCS | Performed by: INTERNAL MEDICINE

## 2017-01-19 RX ORDER — ONDANSETRON 2 MG/ML
INJECTION INTRAMUSCULAR; INTRAVENOUS AS NEEDED
Status: DISCONTINUED | OUTPATIENT
Start: 2017-01-19 | End: 2017-01-19 | Stop reason: SURG

## 2017-01-19 RX ORDER — PROPOFOL 10 MG/ML
INJECTION, EMULSION INTRAVENOUS CONTINUOUS PRN
Status: DISCONTINUED | OUTPATIENT
Start: 2017-01-19 | End: 2017-01-19 | Stop reason: SURG

## 2017-01-19 RX ORDER — LIDOCAINE HYDROCHLORIDE 10 MG/ML
INJECTION, SOLUTION INFILTRATION; PERINEURAL CODE/TRAUMA/SEDATION MEDICATION
Status: COMPLETED | OUTPATIENT
Start: 2017-01-19 | End: 2017-01-19

## 2017-01-19 RX ADMIN — ONDANSETRON 4 MG: 2 INJECTION INTRAMUSCULAR; INTRAVENOUS at 10:30

## 2017-01-19 RX ADMIN — METOPROLOL TARTRATE 12.5 MG: 25 TABLET ORAL at 21:56

## 2017-01-19 RX ADMIN — IOHEXOL 10 ML: 350 INJECTION, SOLUTION INTRAVENOUS at 10:15

## 2017-01-19 RX ADMIN — HYDRALAZINE HYDROCHLORIDE 10 MG: 20 INJECTION INTRAMUSCULAR; INTRAVENOUS at 14:25

## 2017-01-19 RX ADMIN — LIDOCAINE HYDROCHLORIDE 20 ML: 10 INJECTION, SOLUTION INFILTRATION; PERINEURAL at 09:46

## 2017-01-19 RX ADMIN — PANTOPRAZOLE SODIUM 40 MG: 40 INJECTION, POWDER, FOR SOLUTION INTRAVENOUS at 08:22

## 2017-01-19 RX ADMIN — SODIUM CHLORIDE: 0.9 INJECTION, SOLUTION INTRAVENOUS at 08:31

## 2017-01-19 RX ADMIN — METOPROLOL TARTRATE 5 MG: 5 INJECTION INTRAVENOUS at 17:34

## 2017-01-19 RX ADMIN — IOHEXOL 10 ML: 350 INJECTION, SOLUTION INTRAVENOUS at 09:30

## 2017-01-19 RX ADMIN — SODIUM CHLORIDE 100 ML/HR: 0.9 INJECTION, SOLUTION INTRAVENOUS at 05:05

## 2017-01-19 RX ADMIN — IOHEXOL 10 ML: 350 INJECTION, SOLUTION INTRAVENOUS at 10:10

## 2017-01-19 RX ADMIN — SODIUM PHOSPHATE, MONOBASIC, MONOHYDRATE AND SODIUM PHOSPHATE, DIBASIC ANHYDROUS 9 MMOL: 276; 142 INJECTION, SOLUTION INTRAVENOUS at 14:15

## 2017-01-19 RX ADMIN — PROPOFOL 80 MCG/KG/MIN: 10 INJECTION, EMULSION INTRAVENOUS at 09:29

## 2017-01-19 RX ADMIN — CEFAZOLIN SODIUM 1000 MG: 1 SOLUTION INTRAVENOUS at 09:29

## 2017-01-20 ENCOUNTER — APPOINTMENT (INPATIENT)
Dept: RADIOLOGY | Facility: HOSPITAL | Age: 82
DRG: 242 | End: 2017-01-20
Payer: MEDICARE

## 2017-01-20 ENCOUNTER — APPOINTMENT (INPATIENT)
Dept: RADIOLOGY | Facility: HOSPITAL | Age: 82
DRG: 242 | End: 2017-01-20
Attending: INTERNAL MEDICINE
Payer: MEDICARE

## 2017-01-20 LAB
ANION GAP SERPL CALCULATED.3IONS-SCNC: 9 MMOL/L (ref 4–13)
ATRIAL RATE: 107 BPM
BUN SERPL-MCNC: 11 MG/DL (ref 5–25)
CALCIUM SERPL-MCNC: 8.2 MG/DL (ref 8.3–10.1)
CHLORIDE SERPL-SCNC: 105 MMOL/L (ref 100–108)
CO2 SERPL-SCNC: 21 MMOL/L (ref 21–32)
CREAT SERPL-MCNC: 0.55 MG/DL (ref 0.6–1.3)
ERYTHROCYTE [DISTWIDTH] IN BLOOD BY AUTOMATED COUNT: 13.9 % (ref 11.6–15.1)
GFR SERPL CREATININE-BSD FRML MDRD: >60 ML/MIN/1.73SQ M
GLUCOSE SERPL-MCNC: 119 MG/DL (ref 65–140)
HCT VFR BLD AUTO: 31.9 % (ref 34.8–46.1)
HGB BLD-MCNC: 11 G/DL (ref 11.5–15.4)
MCH RBC QN AUTO: 31.6 PG (ref 26.8–34.3)
MCHC RBC AUTO-ENTMCNC: 34.5 G/DL (ref 31.4–37.4)
MCV RBC AUTO: 92 FL (ref 82–98)
P AXIS: 76 DEGREES
PLATELET # BLD AUTO: 287 THOUSANDS/UL (ref 149–390)
PMV BLD AUTO: 9.7 FL (ref 8.9–12.7)
POTASSIUM SERPL-SCNC: 3.5 MMOL/L (ref 3.5–5.3)
PR INTERVAL: 171 MS
QRS AXIS: 116 DEGREES
QRSD INTERVAL: 75 MS
QT INTERVAL: 333 MS
QTC INTERVAL: 444 MS
RBC # BLD AUTO: 3.48 MILLION/UL (ref 3.81–5.12)
SODIUM SERPL-SCNC: 135 MMOL/L (ref 136–145)
T WAVE AXIS: 63 DEGREES
VENTRICULAR RATE: 107 BPM
WBC # BLD AUTO: 10.52 THOUSAND/UL (ref 4.31–10.16)

## 2017-01-20 PROCEDURE — 80048 BASIC METABOLIC PNL TOTAL CA: CPT | Performed by: PHYSICIAN ASSISTANT

## 2017-01-20 PROCEDURE — G8988 SELF CARE GOAL STATUS: HCPCS

## 2017-01-20 PROCEDURE — 85027 COMPLETE CBC AUTOMATED: CPT | Performed by: PHYSICIAN ASSISTANT

## 2017-01-20 PROCEDURE — 92526 ORAL FUNCTION THERAPY: CPT

## 2017-01-20 PROCEDURE — 71020 HB CHEST X-RAY 2VW FRONTAL&LATL: CPT

## 2017-01-20 PROCEDURE — 97167 OT EVAL HIGH COMPLEX 60 MIN: CPT

## 2017-01-20 PROCEDURE — 70450 CT HEAD/BRAIN W/O DYE: CPT

## 2017-01-20 PROCEDURE — 97163 PT EVAL HIGH COMPLEX 45 MIN: CPT

## 2017-01-20 PROCEDURE — G8987 SELF CARE CURRENT STATUS: HCPCS

## 2017-01-20 PROCEDURE — G8978 MOBILITY CURRENT STATUS: HCPCS

## 2017-01-20 PROCEDURE — G8979 MOBILITY GOAL STATUS: HCPCS

## 2017-01-20 RX ADMIN — METOPROLOL TARTRATE 12.5 MG: 25 TABLET ORAL at 08:51

## 2017-01-20 RX ADMIN — METOPROLOL TARTRATE 12.5 MG: 25 TABLET ORAL at 18:45

## 2017-01-21 RX ADMIN — METOPROLOL TARTRATE 12.5 MG: 25 TABLET ORAL at 18:16

## 2017-01-21 RX ADMIN — METOPROLOL TARTRATE 12.5 MG: 25 TABLET ORAL at 08:29

## 2017-01-22 LAB
BACTERIA BLD CULT: NORMAL
BACTERIA BLD CULT: NORMAL

## 2017-01-22 RX ADMIN — METOPROLOL TARTRATE 12.5 MG: 25 TABLET ORAL at 17:59

## 2017-01-22 RX ADMIN — ENOXAPARIN SODIUM 40 MG: 40 INJECTION SUBCUTANEOUS at 08:55

## 2017-01-22 RX ADMIN — METOPROLOL TARTRATE 12.5 MG: 25 TABLET ORAL at 08:55

## 2017-01-23 LAB
HCT VFR BLD AUTO: 31.7 % (ref 34.8–46.1)
HEMOCCULT STL QL: NEGATIVE
HGB BLD-MCNC: 10.8 G/DL (ref 11.5–15.4)

## 2017-01-23 PROCEDURE — 92526 ORAL FUNCTION THERAPY: CPT

## 2017-01-23 PROCEDURE — 82272 OCCULT BLD FECES 1-3 TESTS: CPT | Performed by: PHYSICIAN ASSISTANT

## 2017-01-23 PROCEDURE — 97532 HB COGNITIVE SKILLS DEVELOPMENT: CPT

## 2017-01-23 PROCEDURE — 97535 SELF CARE MNGMENT TRAINING: CPT

## 2017-01-23 PROCEDURE — 85018 HEMOGLOBIN: CPT | Performed by: PHYSICIAN ASSISTANT

## 2017-01-23 PROCEDURE — 85014 HEMATOCRIT: CPT | Performed by: PHYSICIAN ASSISTANT

## 2017-01-23 RX ORDER — DOCUSATE SODIUM 100 MG/1
100 CAPSULE, LIQUID FILLED ORAL 2 TIMES DAILY
Status: DISCONTINUED | OUTPATIENT
Start: 2017-01-23 | End: 2017-01-24 | Stop reason: HOSPADM

## 2017-01-23 RX ORDER — POLYVINYL ALCOHOL 14 MG/ML
1 SOLUTION/ DROPS OPHTHALMIC
Qty: 15 ML | Refills: 0 | Status: SHIPPED | OUTPATIENT
Start: 2017-01-23 | End: 2017-06-16

## 2017-01-23 RX ORDER — POLYETHYLENE GLYCOL 3350 17 G/17G
17 POWDER, FOR SOLUTION ORAL DAILY
Status: DISCONTINUED | OUTPATIENT
Start: 2017-01-23 | End: 2017-01-24 | Stop reason: HOSPADM

## 2017-01-23 RX ORDER — BISACODYL 10 MG
10 SUPPOSITORY, RECTAL RECTAL DAILY PRN
Status: DISCONTINUED | OUTPATIENT
Start: 2017-01-23 | End: 2017-01-24 | Stop reason: HOSPADM

## 2017-01-23 RX ADMIN — DOCUSATE SODIUM 100 MG: 100 CAPSULE, LIQUID FILLED ORAL at 17:52

## 2017-01-23 RX ADMIN — ENOXAPARIN SODIUM 40 MG: 40 INJECTION SUBCUTANEOUS at 08:32

## 2017-01-23 RX ADMIN — METOPROLOL TARTRATE 12.5 MG: 25 TABLET ORAL at 17:52

## 2017-01-23 RX ADMIN — POLYETHYLENE GLYCOL 3350 17 G: 17 POWDER, FOR SOLUTION ORAL at 12:35

## 2017-01-23 RX ADMIN — BISACODYL 10 MG: 10 SUPPOSITORY RECTAL at 14:13

## 2017-01-23 RX ADMIN — METOPROLOL TARTRATE 12.5 MG: 25 TABLET ORAL at 08:31

## 2017-01-24 VITALS
HEIGHT: 62 IN | HEART RATE: 89 BPM | DIASTOLIC BLOOD PRESSURE: 63 MMHG | OXYGEN SATURATION: 97 % | SYSTOLIC BLOOD PRESSURE: 134 MMHG | RESPIRATION RATE: 18 BRPM | WEIGHT: 132.94 LBS | TEMPERATURE: 98.7 F | BODY MASS INDEX: 24.46 KG/M2

## 2017-01-24 PROCEDURE — 97116 GAIT TRAINING THERAPY: CPT

## 2017-01-24 PROCEDURE — 97530 THERAPEUTIC ACTIVITIES: CPT

## 2017-01-24 RX ORDER — POLYETHYLENE GLYCOL 3350 17 G/17G
17 POWDER, FOR SOLUTION ORAL DAILY PRN
Qty: 510 G | Refills: 0 | Status: SHIPPED | OUTPATIENT
Start: 2017-01-24 | End: 2021-06-05 | Stop reason: HOSPADM

## 2017-01-24 RX ADMIN — METOPROLOL TARTRATE 12.5 MG: 25 TABLET ORAL at 08:45

## 2017-01-24 RX ADMIN — ENOXAPARIN SODIUM 40 MG: 40 INJECTION SUBCUTANEOUS at 08:46

## 2017-02-10 ENCOUNTER — ALLSCRIPTS OFFICE VISIT (OUTPATIENT)
Dept: OTHER | Facility: OTHER | Age: 82
End: 2017-02-10

## 2017-04-28 ENCOUNTER — ALLSCRIPTS OFFICE VISIT (OUTPATIENT)
Dept: OTHER | Facility: OTHER | Age: 82
End: 2017-04-28

## 2017-05-26 ENCOUNTER — ALLSCRIPTS OFFICE VISIT (OUTPATIENT)
Dept: OTHER | Facility: OTHER | Age: 82
End: 2017-05-26

## 2017-06-16 ENCOUNTER — APPOINTMENT (EMERGENCY)
Dept: RADIOLOGY | Facility: HOSPITAL | Age: 82
DRG: 698 | End: 2017-06-16
Payer: MEDICARE

## 2017-06-16 ENCOUNTER — HOSPITAL ENCOUNTER (INPATIENT)
Facility: HOSPITAL | Age: 82
LOS: 4 days | Discharge: NURSING, REHAB OR LONG-TERM CARE | DRG: 698 | End: 2017-06-20
Attending: EMERGENCY MEDICINE | Admitting: HOSPITALIST
Payer: MEDICARE

## 2017-06-16 ENCOUNTER — APPOINTMENT (EMERGENCY)
Dept: CT IMAGING | Facility: HOSPITAL | Age: 82
DRG: 698 | End: 2017-06-16
Payer: MEDICARE

## 2017-06-16 ENCOUNTER — APPOINTMENT (INPATIENT)
Dept: CT IMAGING | Facility: HOSPITAL | Age: 82
DRG: 698 | End: 2017-06-16
Payer: MEDICARE

## 2017-06-16 DIAGNOSIS — N39.0 URINARY TRACT INFECTION: ICD-10-CM

## 2017-06-16 DIAGNOSIS — R33.9 URINARY RETENTION: ICD-10-CM

## 2017-06-16 DIAGNOSIS — G93.40 ENCEPHALOPATHY: Primary | ICD-10-CM

## 2017-06-16 DIAGNOSIS — R09.02 HYPOXIA: ICD-10-CM

## 2017-06-16 LAB
ALBUMIN SERPL BCP-MCNC: 2.6 G/DL (ref 3.5–5)
ALP SERPL-CCNC: 521 U/L (ref 46–116)
ALT SERPL W P-5'-P-CCNC: 38 U/L (ref 12–78)
ANION GAP SERPL CALCULATED.3IONS-SCNC: 8 MMOL/L (ref 4–13)
AST SERPL W P-5'-P-CCNC: 34 U/L (ref 5–45)
BACTERIA UR QL AUTO: ABNORMAL /HPF
BASE EX.OXY STD BLDV CALC-SCNC: 75.7 % (ref 60–80)
BASE EXCESS BLDV CALC-SCNC: -0.1 MMOL/L
BASOPHILS # BLD AUTO: 0.02 THOUSANDS/ΜL (ref 0–0.1)
BASOPHILS NFR BLD AUTO: 0 % (ref 0–1)
BILIRUB SERPL-MCNC: 0.38 MG/DL (ref 0.2–1)
BILIRUB UR QL STRIP: NEGATIVE
BUN SERPL-MCNC: 17 MG/DL (ref 5–25)
CALCIUM SERPL-MCNC: 8.9 MG/DL (ref 8.3–10.1)
CHLORIDE SERPL-SCNC: 97 MMOL/L (ref 100–108)
CLARITY UR: ABNORMAL
CO2 SERPL-SCNC: 26 MMOL/L (ref 21–32)
COLOR UR: YELLOW
COLOR, POC: YELLOW
CREAT SERPL-MCNC: 0.78 MG/DL (ref 0.6–1.3)
EOSINOPHIL # BLD AUTO: 0.05 THOUSAND/ΜL (ref 0–0.61)
EOSINOPHIL NFR BLD AUTO: 1 % (ref 0–6)
ERYTHROCYTE [DISTWIDTH] IN BLOOD BY AUTOMATED COUNT: 16.5 % (ref 11.6–15.1)
GFR SERPL CREATININE-BSD FRML MDRD: >60 ML/MIN/1.73SQ M
GLUCOSE SERPL-MCNC: 114 MG/DL (ref 65–140)
GLUCOSE UR STRIP-MCNC: NEGATIVE MG/DL
HCO3 BLDV-SCNC: 25.3 MMOL/L (ref 24–30)
HCT VFR BLD AUTO: 35.5 % (ref 34.8–46.1)
HGB BLD-MCNC: 12.2 G/DL (ref 11.5–15.4)
HGB UR QL STRIP.AUTO: ABNORMAL
KETONES UR STRIP-MCNC: NEGATIVE MG/DL
LACTATE SERPL-SCNC: 1.5 MMOL/L (ref 0.5–2)
LEUKOCYTE ESTERASE UR QL STRIP: ABNORMAL
LIPASE SERPL-CCNC: 117 U/L (ref 73–393)
LYMPHOCYTES # BLD AUTO: 0.97 THOUSANDS/ΜL (ref 0.6–4.47)
LYMPHOCYTES NFR BLD AUTO: 11 % (ref 14–44)
MCH RBC QN AUTO: 29.8 PG (ref 26.8–34.3)
MCHC RBC AUTO-ENTMCNC: 34.4 G/DL (ref 31.4–37.4)
MCV RBC AUTO: 87 FL (ref 82–98)
MONOCYTES # BLD AUTO: 0.71 THOUSAND/ΜL (ref 0.17–1.22)
MONOCYTES NFR BLD AUTO: 8 % (ref 4–12)
NEUTROPHILS # BLD AUTO: 7.5 THOUSANDS/ΜL (ref 1.85–7.62)
NEUTS SEG NFR BLD AUTO: 80 % (ref 43–75)
NITRITE UR QL STRIP: POSITIVE
NON-SQ EPI CELLS URNS QL MICRO: ABNORMAL /HPF
NRBC BLD AUTO-RTO: 0 /100 WBCS
O2 CT BLDV-SCNC: 13.6 ML/DL
PCO2 BLDV: 44.3 MM HG (ref 42–50)
PH BLDV: 7.38 [PH] (ref 7.3–7.4)
PH UR STRIP.AUTO: 7 [PH] (ref 4.5–8)
PLATELET # BLD AUTO: 357 THOUSANDS/UL (ref 149–390)
PMV BLD AUTO: 9.3 FL (ref 8.9–12.7)
PO2 BLDV: 45.1 MM HG (ref 35–45)
POTASSIUM SERPL-SCNC: 4.3 MMOL/L (ref 3.5–5.3)
PROT SERPL-MCNC: 7.2 G/DL (ref 6.4–8.2)
PROT UR STRIP-MCNC: ABNORMAL MG/DL
RBC # BLD AUTO: 4.1 MILLION/UL (ref 3.81–5.12)
RBC #/AREA URNS AUTO: ABNORMAL /HPF
SODIUM SERPL-SCNC: 131 MMOL/L (ref 136–145)
SP GR UR STRIP.AUTO: 1.02 (ref 1–1.03)
SPECIMEN SOURCE: NORMAL
T4 FREE SERPL-MCNC: 1.2 NG/DL (ref 0.76–1.46)
TROPONIN I BLD-MCNC: 0.07 NG/ML (ref 0–0.08)
TSH SERPL DL<=0.05 MIU/L-ACNC: 4.5 UIU/ML (ref 0.36–3.74)
UROBILINOGEN UR QL STRIP.AUTO: 1 E.U./DL
WBC # BLD AUTO: 9.25 THOUSAND/UL (ref 4.31–10.16)
WBC #/AREA URNS AUTO: ABNORMAL /HPF

## 2017-06-16 PROCEDURE — 96361 HYDRATE IV INFUSION ADD-ON: CPT

## 2017-06-16 PROCEDURE — 83605 ASSAY OF LACTIC ACID: CPT | Performed by: EMERGENCY MEDICINE

## 2017-06-16 PROCEDURE — 81001 URINALYSIS AUTO W/SCOPE: CPT

## 2017-06-16 PROCEDURE — 70450 CT HEAD/BRAIN W/O DYE: CPT

## 2017-06-16 PROCEDURE — 84443 ASSAY THYROID STIM HORMONE: CPT | Performed by: HOSPITALIST

## 2017-06-16 PROCEDURE — 82805 BLOOD GASES W/O2 SATURATION: CPT | Performed by: EMERGENCY MEDICINE

## 2017-06-16 PROCEDURE — 99285 EMERGENCY DEPT VISIT HI MDM: CPT

## 2017-06-16 PROCEDURE — 84439 ASSAY OF FREE THYROXINE: CPT | Performed by: HOSPITALIST

## 2017-06-16 PROCEDURE — 85025 COMPLETE CBC W/AUTO DIFF WBC: CPT | Performed by: EMERGENCY MEDICINE

## 2017-06-16 PROCEDURE — 94760 N-INVAS EAR/PLS OXIMETRY 1: CPT

## 2017-06-16 PROCEDURE — 36415 COLL VENOUS BLD VENIPUNCTURE: CPT | Performed by: EMERGENCY MEDICINE

## 2017-06-16 PROCEDURE — 81002 URINALYSIS NONAUTO W/O SCOPE: CPT | Performed by: EMERGENCY MEDICINE

## 2017-06-16 PROCEDURE — 71020 HB CHEST X-RAY 2VW FRONTAL&LATL: CPT

## 2017-06-16 PROCEDURE — 87040 BLOOD CULTURE FOR BACTERIA: CPT | Performed by: EMERGENCY MEDICINE

## 2017-06-16 PROCEDURE — 80053 COMPREHEN METABOLIC PANEL: CPT | Performed by: EMERGENCY MEDICINE

## 2017-06-16 PROCEDURE — 96365 THER/PROPH/DIAG IV INF INIT: CPT

## 2017-06-16 PROCEDURE — 74177 CT ABD & PELVIS W/CONTRAST: CPT

## 2017-06-16 PROCEDURE — 93005 ELECTROCARDIOGRAM TRACING: CPT | Performed by: EMERGENCY MEDICINE

## 2017-06-16 PROCEDURE — 84484 ASSAY OF TROPONIN QUANT: CPT

## 2017-06-16 PROCEDURE — 83690 ASSAY OF LIPASE: CPT | Performed by: EMERGENCY MEDICINE

## 2017-06-16 RX ORDER — ACETAMINOPHEN 650 MG/1
650 SUPPOSITORY RECTAL EVERY 4 HOURS PRN
COMMUNITY
End: 2017-06-20 | Stop reason: HOSPADM

## 2017-06-16 RX ORDER — SERTRALINE HYDROCHLORIDE 25 MG/1
25 TABLET, FILM COATED ORAL DAILY
COMMUNITY
End: 2021-01-17

## 2017-06-16 RX ORDER — BISACODYL 10 MG
10 SUPPOSITORY, RECTAL RECTAL DAILY
COMMUNITY
End: 2021-06-05 | Stop reason: HOSPADM

## 2017-06-16 RX ORDER — ONDANSETRON 2 MG/ML
4 INJECTION INTRAMUSCULAR; INTRAVENOUS EVERY 6 HOURS PRN
Status: DISCONTINUED | OUTPATIENT
Start: 2017-06-16 | End: 2017-06-20 | Stop reason: HOSPADM

## 2017-06-16 RX ORDER — HYDRALAZINE HYDROCHLORIDE 20 MG/ML
10 INJECTION INTRAMUSCULAR; INTRAVENOUS EVERY 6 HOURS PRN
Status: DISCONTINUED | OUTPATIENT
Start: 2017-06-16 | End: 2017-06-20 | Stop reason: HOSPADM

## 2017-06-16 RX ORDER — HEPARIN SODIUM 5000 [USP'U]/ML
5000 INJECTION, SOLUTION INTRAVENOUS; SUBCUTANEOUS EVERY 8 HOURS SCHEDULED
Status: DISCONTINUED | OUTPATIENT
Start: 2017-06-16 | End: 2017-06-20 | Stop reason: HOSPADM

## 2017-06-16 RX ORDER — POLYETHYLENE GLYCOL 3350 17 G/17G
17 POWDER, FOR SOLUTION ORAL DAILY PRN
Status: DISCONTINUED | OUTPATIENT
Start: 2017-06-16 | End: 2017-06-20 | Stop reason: HOSPADM

## 2017-06-16 RX ORDER — HYDRALAZINE HYDROCHLORIDE 20 MG/ML
5 INJECTION INTRAMUSCULAR; INTRAVENOUS ONCE
Status: COMPLETED | OUTPATIENT
Start: 2017-06-16 | End: 2017-06-16

## 2017-06-16 RX ORDER — ACETAMINOPHEN 650 MG/1
650 SUPPOSITORY RECTAL EVERY 4 HOURS PRN
Status: DISCONTINUED | OUTPATIENT
Start: 2017-06-16 | End: 2017-06-18

## 2017-06-16 RX ORDER — MAGNESIUM HYDROXIDE/ALUMINUM HYDROXICE/SIMETHICONE 120; 1200; 1200 MG/30ML; MG/30ML; MG/30ML
30 SUSPENSION ORAL EVERY 6 HOURS PRN
Status: DISCONTINUED | OUTPATIENT
Start: 2017-06-16 | End: 2017-06-20 | Stop reason: HOSPADM

## 2017-06-16 RX ORDER — FERROUS SULFATE 325(65) MG
325 TABLET ORAL
COMMUNITY
End: 2021-06-05 | Stop reason: HOSPADM

## 2017-06-16 RX ORDER — CHOLECALCIFEROL (VITAMIN D3) 125 MCG
500 CAPSULE ORAL DAILY
Status: DISCONTINUED | OUTPATIENT
Start: 2017-06-16 | End: 2017-06-20 | Stop reason: HOSPADM

## 2017-06-16 RX ORDER — ALBUTEROL SULFATE 2.5 MG/3ML
2.5 SOLUTION RESPIRATORY (INHALATION) EVERY 6 HOURS PRN
Status: DISCONTINUED | OUTPATIENT
Start: 2017-06-16 | End: 2017-06-20 | Stop reason: HOSPADM

## 2017-06-16 RX ORDER — SODIUM CHLORIDE 9 MG/ML
50 INJECTION, SOLUTION INTRAVENOUS CONTINUOUS
Status: DISCONTINUED | OUTPATIENT
Start: 2017-06-16 | End: 2017-06-17

## 2017-06-16 RX ORDER — ACETAMINOPHEN 325 MG/1
500 TABLET ORAL EVERY 6 HOURS PRN
Status: DISCONTINUED | OUTPATIENT
Start: 2017-06-16 | End: 2017-06-20 | Stop reason: HOSPADM

## 2017-06-16 RX ORDER — FOLIC ACID 1 MG/1
1 TABLET ORAL DAILY
COMMUNITY
End: 2021-06-05 | Stop reason: HOSPADM

## 2017-06-16 RX ORDER — FOLIC ACID 1 MG/1
1 TABLET ORAL DAILY
Status: DISCONTINUED | OUTPATIENT
Start: 2017-06-16 | End: 2017-06-20 | Stop reason: HOSPADM

## 2017-06-16 RX ORDER — SERTRALINE HYDROCHLORIDE 25 MG/1
25 TABLET, FILM COATED ORAL DAILY
Status: DISCONTINUED | OUTPATIENT
Start: 2017-06-16 | End: 2017-06-20 | Stop reason: HOSPADM

## 2017-06-16 RX ORDER — BISACODYL 10 MG
10 SUPPOSITORY, RECTAL RECTAL DAILY
Status: DISCONTINUED | OUTPATIENT
Start: 2017-06-16 | End: 2017-06-20 | Stop reason: HOSPADM

## 2017-06-16 RX ORDER — ALBUTEROL SULFATE 2.5 MG/3ML
SOLUTION RESPIRATORY (INHALATION) EVERY 6 HOURS PRN
COMMUNITY
End: 2021-01-17

## 2017-06-16 RX ORDER — CHOLECALCIFEROL (VITAMIN D3) 125 MCG
500 CAPSULE ORAL DAILY
COMMUNITY
End: 2021-01-17

## 2017-06-16 RX ORDER — ACETAMINOPHEN 500 MG
500 TABLET ORAL EVERY 6 HOURS PRN
COMMUNITY
End: 2021-05-31

## 2017-06-16 RX ADMIN — SODIUM CHLORIDE 50 ML/HR: 0.9 INJECTION, SOLUTION INTRAVENOUS at 14:28

## 2017-06-16 RX ADMIN — HEPARIN SODIUM 5000 UNITS: 5000 INJECTION, SOLUTION INTRAVENOUS; SUBCUTANEOUS at 14:32

## 2017-06-16 RX ADMIN — HYDRALAZINE HYDROCHLORIDE 5 MG: 20 INJECTION INTRAMUSCULAR; INTRAVENOUS at 12:11

## 2017-06-16 RX ADMIN — HEPARIN SODIUM 5000 UNITS: 5000 INJECTION, SOLUTION INTRAVENOUS; SUBCUTANEOUS at 21:22

## 2017-06-16 RX ADMIN — CEFTRIAXONE 1000 MG: 1 INJECTION, POWDER, FOR SOLUTION INTRAMUSCULAR; INTRAVENOUS at 10:58

## 2017-06-16 RX ADMIN — SODIUM CHLORIDE 1000 ML: 0.9 INJECTION, SOLUTION INTRAVENOUS at 09:03

## 2017-06-16 RX ADMIN — IOHEXOL 85 ML: 350 INJECTION, SOLUTION INTRAVENOUS at 10:07

## 2017-06-16 RX ADMIN — METOPROLOL TARTRATE 12.5 MG: 25 TABLET ORAL at 17:40

## 2017-06-17 PROBLEM — F02.80 LATE ONSET ALZHEIMER'S DISEASE WITHOUT BEHAVIORAL DISTURBANCE (HCC): Status: ACTIVE | Noted: 2017-06-17

## 2017-06-17 PROBLEM — G30.1 LATE ONSET ALZHEIMER'S DISEASE WITHOUT BEHAVIORAL DISTURBANCE (HCC): Status: ACTIVE | Noted: 2017-06-17

## 2017-06-17 LAB
ALBUMIN SERPL BCP-MCNC: 2.4 G/DL (ref 3.5–5)
ALP SERPL-CCNC: 419 U/L (ref 46–116)
ALT SERPL W P-5'-P-CCNC: 29 U/L (ref 12–78)
ANION GAP SERPL CALCULATED.3IONS-SCNC: 9 MMOL/L (ref 4–13)
AST SERPL W P-5'-P-CCNC: 28 U/L (ref 5–45)
ATRIAL RATE: 63 BPM
BILIRUB SERPL-MCNC: 0.4 MG/DL (ref 0.2–1)
BUN SERPL-MCNC: 15 MG/DL (ref 5–25)
CALCIUM SERPL-MCNC: 8.5 MG/DL (ref 8.3–10.1)
CHLORIDE SERPL-SCNC: 100 MMOL/L (ref 100–108)
CO2 SERPL-SCNC: 25 MMOL/L (ref 21–32)
CREAT SERPL-MCNC: 0.63 MG/DL (ref 0.6–1.3)
ERYTHROCYTE [DISTWIDTH] IN BLOOD BY AUTOMATED COUNT: 17 % (ref 11.6–15.1)
GFR SERPL CREATININE-BSD FRML MDRD: >60 ML/MIN/1.73SQ M
GLUCOSE SERPL-MCNC: 88 MG/DL (ref 65–140)
HCT VFR BLD AUTO: 33.3 % (ref 34.8–46.1)
HGB BLD-MCNC: 10.6 G/DL (ref 11.5–15.4)
MCH RBC QN AUTO: 28 PG (ref 26.8–34.3)
MCHC RBC AUTO-ENTMCNC: 31.8 G/DL (ref 31.4–37.4)
MCV RBC AUTO: 88 FL (ref 82–98)
P AXIS: 62 DEGREES
PLATELET # BLD AUTO: 401 THOUSANDS/UL (ref 149–390)
PMV BLD AUTO: 9.6 FL (ref 8.9–12.7)
POTASSIUM SERPL-SCNC: 3.5 MMOL/L (ref 3.5–5.3)
PR INTERVAL: 218 MS
PROT SERPL-MCNC: 6.3 G/DL (ref 6.4–8.2)
QRS AXIS: 96 DEGREES
QRSD INTERVAL: 78 MS
QT INTERVAL: 400 MS
QTC INTERVAL: 409 MS
RBC # BLD AUTO: 3.79 MILLION/UL (ref 3.81–5.12)
SODIUM SERPL-SCNC: 134 MMOL/L (ref 136–145)
T WAVE AXIS: 71 DEGREES
VENTRICULAR RATE: 63 BPM
WBC # BLD AUTO: 5.8 THOUSAND/UL (ref 4.31–10.16)

## 2017-06-17 PROCEDURE — 85027 COMPLETE CBC AUTOMATED: CPT | Performed by: PHYSICIAN ASSISTANT

## 2017-06-17 PROCEDURE — 80053 COMPREHEN METABOLIC PANEL: CPT | Performed by: PHYSICIAN ASSISTANT

## 2017-06-17 RX ORDER — CEPHALEXIN 500 MG/1
500 CAPSULE ORAL EVERY 6 HOURS SCHEDULED
Status: DISCONTINUED | OUTPATIENT
Start: 2017-06-17 | End: 2017-06-18

## 2017-06-17 RX ADMIN — CEFTRIAXONE 1000 MG: 1 INJECTION, POWDER, FOR SOLUTION INTRAMUSCULAR; INTRAVENOUS at 12:05

## 2017-06-17 RX ADMIN — METOPROLOL TARTRATE 12.5 MG: 25 TABLET ORAL at 17:11

## 2017-06-17 RX ADMIN — HEPARIN SODIUM 5000 UNITS: 5000 INJECTION, SOLUTION INTRAVENOUS; SUBCUTANEOUS at 21:50

## 2017-06-17 RX ADMIN — FOLIC ACID 1 MG: 1 TABLET ORAL at 08:48

## 2017-06-17 RX ADMIN — CYANOCOBALAMIN TAB 500 MCG 500 MCG: 500 TAB at 08:48

## 2017-06-17 RX ADMIN — HEPARIN SODIUM 5000 UNITS: 5000 INJECTION, SOLUTION INTRAVENOUS; SUBCUTANEOUS at 15:47

## 2017-06-17 RX ADMIN — SERTRALINE HYDROCHLORIDE 25 MG: 25 TABLET ORAL at 08:48

## 2017-06-17 RX ADMIN — HEPARIN SODIUM 5000 UNITS: 5000 INJECTION, SOLUTION INTRAVENOUS; SUBCUTANEOUS at 05:25

## 2017-06-17 RX ADMIN — CEPHALEXIN 500 MG: 500 CAPSULE ORAL at 20:31

## 2017-06-17 RX ADMIN — BISACODYL 10 MG: 10 SUPPOSITORY RECTAL at 08:48

## 2017-06-17 RX ADMIN — METOPROLOL TARTRATE 12.5 MG: 25 TABLET ORAL at 08:48

## 2017-06-18 ENCOUNTER — APPOINTMENT (INPATIENT)
Dept: PHYSICAL THERAPY | Facility: HOSPITAL | Age: 82
DRG: 698 | End: 2017-06-18
Payer: MEDICARE

## 2017-06-18 LAB
ANION GAP SERPL CALCULATED.3IONS-SCNC: 10 MMOL/L (ref 4–13)
BASOPHILS # BLD AUTO: 0.02 THOUSANDS/ΜL (ref 0–0.1)
BASOPHILS NFR BLD AUTO: 0 % (ref 0–1)
BUN SERPL-MCNC: 7 MG/DL (ref 5–25)
CALCIUM SERPL-MCNC: 8.8 MG/DL (ref 8.3–10.1)
CHLORIDE SERPL-SCNC: 95 MMOL/L (ref 100–108)
CO2 SERPL-SCNC: 25 MMOL/L (ref 21–32)
CREAT SERPL-MCNC: 0.59 MG/DL (ref 0.6–1.3)
EOSINOPHIL # BLD AUTO: 0.05 THOUSAND/ΜL (ref 0–0.61)
EOSINOPHIL NFR BLD AUTO: 1 % (ref 0–6)
ERYTHROCYTE [DISTWIDTH] IN BLOOD BY AUTOMATED COUNT: 16.1 % (ref 11.6–15.1)
GFR SERPL CREATININE-BSD FRML MDRD: >60 ML/MIN/1.73SQ M
GLUCOSE SERPL-MCNC: 109 MG/DL (ref 65–140)
HCT VFR BLD AUTO: 31.6 % (ref 34.8–46.1)
HGB BLD-MCNC: 10.8 G/DL (ref 11.5–15.4)
LYMPHOCYTES # BLD AUTO: 0.92 THOUSANDS/ΜL (ref 0.6–4.47)
LYMPHOCYTES NFR BLD AUTO: 13 % (ref 14–44)
MCH RBC QN AUTO: 29.3 PG (ref 26.8–34.3)
MCHC RBC AUTO-ENTMCNC: 34.2 G/DL (ref 31.4–37.4)
MCV RBC AUTO: 86 FL (ref 82–98)
MONOCYTES # BLD AUTO: 0.99 THOUSAND/ΜL (ref 0.17–1.22)
MONOCYTES NFR BLD AUTO: 14 % (ref 4–12)
NEUTROPHILS # BLD AUTO: 5.32 THOUSANDS/ΜL (ref 1.85–7.62)
NEUTS SEG NFR BLD AUTO: 72 % (ref 43–75)
NRBC BLD AUTO-RTO: 0 /100 WBCS
PLATELET # BLD AUTO: 328 THOUSANDS/UL (ref 149–390)
PMV BLD AUTO: 8.7 FL (ref 8.9–12.7)
POTASSIUM SERPL-SCNC: 3 MMOL/L (ref 3.5–5.3)
RBC # BLD AUTO: 3.68 MILLION/UL (ref 3.81–5.12)
SODIUM SERPL-SCNC: 130 MMOL/L (ref 136–145)
WBC # BLD AUTO: 7.3 THOUSAND/UL (ref 4.31–10.16)

## 2017-06-18 PROCEDURE — G8989 SELF CARE D/C STATUS: HCPCS

## 2017-06-18 PROCEDURE — 80048 BASIC METABOLIC PNL TOTAL CA: CPT | Performed by: INTERNAL MEDICINE

## 2017-06-18 PROCEDURE — 97167 OT EVAL HIGH COMPLEX 60 MIN: CPT

## 2017-06-18 PROCEDURE — G8979 MOBILITY GOAL STATUS: HCPCS

## 2017-06-18 PROCEDURE — G8988 SELF CARE GOAL STATUS: HCPCS

## 2017-06-18 PROCEDURE — 97163 PT EVAL HIGH COMPLEX 45 MIN: CPT

## 2017-06-18 PROCEDURE — 85025 COMPLETE CBC W/AUTO DIFF WBC: CPT | Performed by: INTERNAL MEDICINE

## 2017-06-18 PROCEDURE — G8978 MOBILITY CURRENT STATUS: HCPCS

## 2017-06-18 PROCEDURE — G8987 SELF CARE CURRENT STATUS: HCPCS

## 2017-06-18 RX ORDER — POTASSIUM CHLORIDE 20 MEQ/1
40 TABLET, EXTENDED RELEASE ORAL ONCE
Status: COMPLETED | OUTPATIENT
Start: 2017-06-18 | End: 2017-06-18

## 2017-06-18 RX ORDER — TRAZODONE HYDROCHLORIDE 50 MG/1
50 TABLET ORAL
Status: DISCONTINUED | OUTPATIENT
Start: 2017-06-18 | End: 2017-06-19

## 2017-06-18 RX ORDER — SODIUM CHLORIDE 1000 MG
1 TABLET, SOLUBLE MISCELLANEOUS
Status: DISCONTINUED | OUTPATIENT
Start: 2017-06-18 | End: 2017-06-19

## 2017-06-18 RX ORDER — LANOLIN ALCOHOL/MO/W.PET/CERES
3 CREAM (GRAM) TOPICAL
Status: DISCONTINUED | OUTPATIENT
Start: 2017-06-18 | End: 2017-06-20 | Stop reason: HOSPADM

## 2017-06-18 RX ADMIN — CYANOCOBALAMIN TAB 500 MCG 500 MCG: 500 TAB at 07:31

## 2017-06-18 RX ADMIN — METOPROLOL TARTRATE 25 MG: 25 TABLET ORAL at 17:23

## 2017-06-18 RX ADMIN — METOPROLOL TARTRATE 12.5 MG: 25 TABLET ORAL at 07:30

## 2017-06-18 RX ADMIN — SERTRALINE HYDROCHLORIDE 25 MG: 25 TABLET ORAL at 07:31

## 2017-06-18 RX ADMIN — SODIUM CHLORIDE TAB 1 GM 1 G: 1 TAB at 13:53

## 2017-06-18 RX ADMIN — CEPHALEXIN 500 MG: 500 CAPSULE ORAL at 06:39

## 2017-06-18 RX ADMIN — CEFAZOLIN SODIUM 1000 MG: 1 SOLUTION INTRAVENOUS at 13:48

## 2017-06-18 RX ADMIN — HEPARIN SODIUM 5000 UNITS: 5000 INJECTION, SOLUTION INTRAVENOUS; SUBCUTANEOUS at 06:00

## 2017-06-18 RX ADMIN — MELATONIN TAB 3 MG 3 MG: 3 TAB at 21:48

## 2017-06-18 RX ADMIN — POTASSIUM CHLORIDE 40 MEQ: 1500 TABLET, EXTENDED RELEASE ORAL at 13:54

## 2017-06-18 RX ADMIN — FOLIC ACID 1 MG: 1 TABLET ORAL at 07:30

## 2017-06-18 RX ADMIN — CEPHALEXIN 500 MG: 500 CAPSULE ORAL at 11:11

## 2017-06-18 RX ADMIN — CEFAZOLIN SODIUM 1000 MG: 1 SOLUTION INTRAVENOUS at 20:11

## 2017-06-18 RX ADMIN — ACETAMINOPHEN 488 MG: 325 TABLET, FILM COATED ORAL at 11:13

## 2017-06-18 RX ADMIN — TRAZODONE HYDROCHLORIDE 50 MG: 50 TABLET ORAL at 21:47

## 2017-06-18 RX ADMIN — SODIUM CHLORIDE TAB 1 GM 1 G: 1 TAB at 15:35

## 2017-06-18 RX ADMIN — CEPHALEXIN 500 MG: 500 CAPSULE ORAL at 00:25

## 2017-06-18 RX ADMIN — HEPARIN SODIUM 5000 UNITS: 5000 INJECTION, SOLUTION INTRAVENOUS; SUBCUTANEOUS at 13:55

## 2017-06-19 LAB
ANION GAP SERPL CALCULATED.3IONS-SCNC: 9 MMOL/L (ref 4–13)
BUN SERPL-MCNC: 12 MG/DL (ref 5–25)
CALCIUM SERPL-MCNC: 9 MG/DL (ref 8.3–10.1)
CHLORIDE SERPL-SCNC: 99 MMOL/L (ref 100–108)
CO2 SERPL-SCNC: 25 MMOL/L (ref 21–32)
CREAT SERPL-MCNC: 0.76 MG/DL (ref 0.6–1.3)
GFR SERPL CREATININE-BSD FRML MDRD: >60 ML/MIN/1.73SQ M
GLUCOSE SERPL-MCNC: 88 MG/DL (ref 65–140)
POTASSIUM SERPL-SCNC: 3.6 MMOL/L (ref 3.5–5.3)
SODIUM SERPL-SCNC: 133 MMOL/L (ref 136–145)

## 2017-06-19 PROCEDURE — 80048 BASIC METABOLIC PNL TOTAL CA: CPT | Performed by: INTERNAL MEDICINE

## 2017-06-19 PROCEDURE — 87086 URINE CULTURE/COLONY COUNT: CPT | Performed by: INTERNAL MEDICINE

## 2017-06-19 RX ORDER — SODIUM CHLORIDE 1000 MG
1 TABLET, SOLUBLE MISCELLANEOUS 2 TIMES DAILY WITH MEALS
Status: DISCONTINUED | OUTPATIENT
Start: 2017-06-19 | End: 2017-06-20 | Stop reason: HOSPADM

## 2017-06-19 RX ADMIN — HEPARIN SODIUM 5000 UNITS: 5000 INJECTION, SOLUTION INTRAVENOUS; SUBCUTANEOUS at 13:22

## 2017-06-19 RX ADMIN — METOPROLOL TARTRATE 25 MG: 25 TABLET ORAL at 12:46

## 2017-06-19 RX ADMIN — SERTRALINE HYDROCHLORIDE 25 MG: 25 TABLET ORAL at 12:46

## 2017-06-19 RX ADMIN — CEFAZOLIN SODIUM 1000 MG: 1 SOLUTION INTRAVENOUS at 12:14

## 2017-06-19 RX ADMIN — SODIUM CHLORIDE TAB 1 GM 1 G: 1 TAB at 17:40

## 2017-06-19 RX ADMIN — MELATONIN TAB 3 MG 3 MG: 3 TAB at 21:55

## 2017-06-19 RX ADMIN — CEFAZOLIN SODIUM 1000 MG: 1 SOLUTION INTRAVENOUS at 21:54

## 2017-06-19 RX ADMIN — CEFAZOLIN SODIUM 1000 MG: 1 SOLUTION INTRAVENOUS at 05:59

## 2017-06-19 RX ADMIN — HEPARIN SODIUM 5000 UNITS: 5000 INJECTION, SOLUTION INTRAVENOUS; SUBCUTANEOUS at 21:55

## 2017-06-19 RX ADMIN — METOPROLOL TARTRATE 25 MG: 25 TABLET ORAL at 17:40

## 2017-06-19 RX ADMIN — SODIUM CHLORIDE TAB 1 GM 1 G: 1 TAB at 12:50

## 2017-06-20 ENCOUNTER — APPOINTMENT (INPATIENT)
Dept: PHYSICAL THERAPY | Facility: HOSPITAL | Age: 82
DRG: 698 | End: 2017-06-20
Payer: MEDICARE

## 2017-06-20 VITALS
SYSTOLIC BLOOD PRESSURE: 104 MMHG | OXYGEN SATURATION: 95 % | BODY MASS INDEX: 21.49 KG/M2 | HEART RATE: 62 BPM | DIASTOLIC BLOOD PRESSURE: 50 MMHG | WEIGHT: 117.5 LBS | RESPIRATION RATE: 18 BRPM | TEMPERATURE: 97.1 F

## 2017-06-20 LAB — BACTERIA UR CULT: NORMAL

## 2017-06-20 PROCEDURE — 97110 THERAPEUTIC EXERCISES: CPT

## 2017-06-20 PROCEDURE — 97116 GAIT TRAINING THERAPY: CPT

## 2017-06-20 RX ORDER — VALSARTAN 160 MG/1
80 TABLET ORAL 2 TIMES DAILY
Status: DISCONTINUED | OUTPATIENT
Start: 2017-06-20 | End: 2017-06-20 | Stop reason: HOSPADM

## 2017-06-20 RX ORDER — CEPHALEXIN 500 MG/1
500 CAPSULE ORAL 3 TIMES DAILY
Qty: 6 CAPSULE | Refills: 0
Start: 2017-06-20 | End: 2017-06-22

## 2017-06-20 RX ADMIN — FOLIC ACID 1 MG: 1 TABLET ORAL at 08:38

## 2017-06-20 RX ADMIN — HEPARIN SODIUM 5000 UNITS: 5000 INJECTION, SOLUTION INTRAVENOUS; SUBCUTANEOUS at 04:35

## 2017-06-20 RX ADMIN — POLYETHYLENE GLYCOL 3350 17 G: 17 POWDER, FOR SOLUTION ORAL at 08:39

## 2017-06-20 RX ADMIN — CYANOCOBALAMIN TAB 500 MCG 500 MCG: 500 TAB at 08:38

## 2017-06-20 RX ADMIN — SERTRALINE HYDROCHLORIDE 25 MG: 25 TABLET ORAL at 08:38

## 2017-06-20 RX ADMIN — VALSARTAN 80 MG: 160 TABLET ORAL at 11:03

## 2017-06-20 RX ADMIN — CEFAZOLIN SODIUM 1000 MG: 1 SOLUTION INTRAVENOUS at 12:34

## 2017-06-20 RX ADMIN — CEFAZOLIN SODIUM 1000 MG: 1 SOLUTION INTRAVENOUS at 04:34

## 2017-06-20 RX ADMIN — SODIUM CHLORIDE TAB 1 GM 1 G: 1 TAB at 07:36

## 2017-06-20 RX ADMIN — METOPROLOL TARTRATE 25 MG: 25 TABLET ORAL at 08:38

## 2017-06-21 LAB
BACTERIA BLD CULT: NORMAL
BACTERIA BLD CULT: NORMAL

## 2017-08-18 ENCOUNTER — APPOINTMENT (EMERGENCY)
Dept: RADIOLOGY | Facility: HOSPITAL | Age: 82
DRG: 698 | End: 2017-08-18
Payer: MEDICARE

## 2017-08-18 ENCOUNTER — APPOINTMENT (EMERGENCY)
Dept: CT IMAGING | Facility: HOSPITAL | Age: 82
DRG: 698 | End: 2017-08-18
Payer: MEDICARE

## 2017-08-18 ENCOUNTER — HOSPITAL ENCOUNTER (INPATIENT)
Facility: HOSPITAL | Age: 82
LOS: 5 days | Discharge: NURSING, REHAB OR LONG-TERM CARE | DRG: 698 | End: 2017-08-23
Attending: EMERGENCY MEDICINE | Admitting: INTERNAL MEDICINE
Payer: MEDICARE

## 2017-08-18 DIAGNOSIS — Z97.8 FOLEY CATHETER IN PLACE ON ADMISSION: Chronic | ICD-10-CM

## 2017-08-18 DIAGNOSIS — R41.82 ALTERED MENTAL STATUS: Primary | ICD-10-CM

## 2017-08-18 DIAGNOSIS — N39.0 URINARY TRACT INFECTION: ICD-10-CM

## 2017-08-18 DIAGNOSIS — E03.9 HYPOTHYROID: ICD-10-CM

## 2017-08-18 PROBLEM — G93.40 ACUTE ENCEPHALOPATHY: Status: ACTIVE | Noted: 2017-08-18

## 2017-08-18 LAB
ALBUMIN SERPL BCP-MCNC: 2.5 G/DL (ref 3.5–5)
ALP SERPL-CCNC: 280 U/L (ref 46–116)
ALT SERPL W P-5'-P-CCNC: 34 U/L (ref 12–78)
ANION GAP SERPL CALCULATED.3IONS-SCNC: 7 MMOL/L (ref 4–13)
APTT PPP: 28 SECONDS (ref 23–35)
AST SERPL W P-5'-P-CCNC: 31 U/L (ref 5–45)
ATRIAL RATE: 70 BPM
BACTERIA UR QL AUTO: ABNORMAL /HPF
BASOPHILS # BLD AUTO: 0.04 THOUSANDS/ΜL (ref 0–0.1)
BASOPHILS NFR BLD AUTO: 1 % (ref 0–1)
BILIRUB SERPL-MCNC: 0.3 MG/DL (ref 0.2–1)
BILIRUB UR QL STRIP: NEGATIVE
BUN SERPL-MCNC: 18 MG/DL (ref 5–25)
CALCIUM SERPL-MCNC: 8.8 MG/DL (ref 8.3–10.1)
CHLORIDE SERPL-SCNC: 100 MMOL/L (ref 100–108)
CLARITY UR: CLEAR
CO2 SERPL-SCNC: 27 MMOL/L (ref 21–32)
COLOR UR: YELLOW
CREAT SERPL-MCNC: 1.07 MG/DL (ref 0.6–1.3)
EOSINOPHIL # BLD AUTO: 0.06 THOUSAND/ΜL (ref 0–0.61)
EOSINOPHIL NFR BLD AUTO: 1 % (ref 0–6)
ERYTHROCYTE [DISTWIDTH] IN BLOOD BY AUTOMATED COUNT: 16 % (ref 11.6–15.1)
GFR SERPL CREATININE-BSD FRML MDRD: 46 ML/MIN/1.73SQ M
GLUCOSE SERPL-MCNC: 106 MG/DL (ref 65–140)
GLUCOSE UR STRIP-MCNC: NEGATIVE MG/DL
HCT VFR BLD AUTO: 32.6 % (ref 34.8–46.1)
HGB BLD-MCNC: 11 G/DL (ref 11.5–15.4)
HGB UR QL STRIP.AUTO: ABNORMAL
INR PPP: 1 (ref 0.86–1.16)
KETONES UR STRIP-MCNC: NEGATIVE MG/DL
LEUKOCYTE ESTERASE UR QL STRIP: ABNORMAL
LIPASE SERPL-CCNC: 126 U/L (ref 73–393)
LYMPHOCYTES # BLD AUTO: 1.37 THOUSANDS/ΜL (ref 0.6–4.47)
LYMPHOCYTES NFR BLD AUTO: 23 % (ref 14–44)
MAGNESIUM SERPL-MCNC: 2 MG/DL (ref 1.6–2.6)
MCH RBC QN AUTO: 30.2 PG (ref 26.8–34.3)
MCHC RBC AUTO-ENTMCNC: 33.7 G/DL (ref 31.4–37.4)
MCV RBC AUTO: 90 FL (ref 82–98)
MONOCYTES # BLD AUTO: 0.7 THOUSAND/ΜL (ref 0.17–1.22)
MONOCYTES NFR BLD AUTO: 12 % (ref 4–12)
NEUTROPHILS # BLD AUTO: 3.69 THOUSANDS/ΜL (ref 1.85–7.62)
NEUTS SEG NFR BLD AUTO: 63 % (ref 43–75)
NITRITE UR QL STRIP: NEGATIVE
NON-SQ EPI CELLS URNS QL MICRO: ABNORMAL /HPF
NRBC BLD AUTO-RTO: 0 /100 WBCS
P AXIS: 64 DEGREES
PH UR STRIP.AUTO: 8.5 [PH] (ref 4.5–8)
PLATELET # BLD AUTO: 335 THOUSANDS/UL (ref 149–390)
PMV BLD AUTO: 9.2 FL (ref 8.9–12.7)
POTASSIUM SERPL-SCNC: 4.1 MMOL/L (ref 3.5–5.3)
PR INTERVAL: 248 MS
PROT SERPL-MCNC: 7 G/DL (ref 6.4–8.2)
PROT UR STRIP-MCNC: NEGATIVE MG/DL
PROTHROMBIN TIME: 13.2 SECONDS (ref 12.1–14.4)
QRS AXIS: 96 DEGREES
QRSD INTERVAL: 78 MS
QT INTERVAL: 376 MS
QTC INTERVAL: 406 MS
RBC # BLD AUTO: 3.64 MILLION/UL (ref 3.81–5.12)
RBC #/AREA URNS AUTO: ABNORMAL /HPF
SODIUM SERPL-SCNC: 134 MMOL/L (ref 136–145)
SP GR UR STRIP.AUTO: 1.01 (ref 1–1.03)
SPECIMEN SOURCE: NORMAL
T WAVE AXIS: 58 DEGREES
T3 SERPL-MCNC: 1 NG/ML (ref 0.6–1.8)
T4 FREE SERPL-MCNC: 1.07 NG/DL (ref 0.76–1.46)
TROPONIN I BLD-MCNC: 0 NG/ML (ref 0–0.08)
TSH SERPL DL<=0.05 MIU/L-ACNC: 8.15 UIU/ML (ref 0.36–3.74)
UROBILINOGEN UR QL STRIP.AUTO: 0.2 E.U./DL
VENTRICULAR RATE: 70 BPM
VIT B12 SERPL-MCNC: 715 PG/ML (ref 100–900)
WBC # BLD AUTO: 5.86 THOUSAND/UL (ref 4.31–10.16)
WBC #/AREA URNS AUTO: ABNORMAL /HPF

## 2017-08-18 PROCEDURE — 83735 ASSAY OF MAGNESIUM: CPT | Performed by: EMERGENCY MEDICINE

## 2017-08-18 PROCEDURE — 87086 URINE CULTURE/COLONY COUNT: CPT

## 2017-08-18 PROCEDURE — 85025 COMPLETE CBC W/AUTO DIFF WBC: CPT | Performed by: EMERGENCY MEDICINE

## 2017-08-18 PROCEDURE — 93005 ELECTROCARDIOGRAM TRACING: CPT | Performed by: EMERGENCY MEDICINE

## 2017-08-18 PROCEDURE — 84443 ASSAY THYROID STIM HORMONE: CPT | Performed by: EMERGENCY MEDICINE

## 2017-08-18 PROCEDURE — 70450 CT HEAD/BRAIN W/O DYE: CPT

## 2017-08-18 PROCEDURE — 84480 ASSAY TRIIODOTHYRONINE (T3): CPT | Performed by: EMERGENCY MEDICINE

## 2017-08-18 PROCEDURE — 93005 ELECTROCARDIOGRAM TRACING: CPT

## 2017-08-18 PROCEDURE — 84484 ASSAY OF TROPONIN QUANT: CPT

## 2017-08-18 PROCEDURE — 96361 HYDRATE IV INFUSION ADD-ON: CPT

## 2017-08-18 PROCEDURE — 84439 ASSAY OF FREE THYROXINE: CPT | Performed by: EMERGENCY MEDICINE

## 2017-08-18 PROCEDURE — 0T2BX0Z CHANGE DRAINAGE DEVICE IN BLADDER, EXTERNAL APPROACH: ICD-10-PCS | Performed by: INTERNAL MEDICINE

## 2017-08-18 PROCEDURE — 87077 CULTURE AEROBIC IDENTIFY: CPT

## 2017-08-18 PROCEDURE — 81002 URINALYSIS NONAUTO W/O SCOPE: CPT | Performed by: EMERGENCY MEDICINE

## 2017-08-18 PROCEDURE — 85610 PROTHROMBIN TIME: CPT | Performed by: EMERGENCY MEDICINE

## 2017-08-18 PROCEDURE — 96360 HYDRATION IV INFUSION INIT: CPT

## 2017-08-18 PROCEDURE — 99285 EMERGENCY DEPT VISIT HI MDM: CPT

## 2017-08-18 PROCEDURE — 83690 ASSAY OF LIPASE: CPT | Performed by: EMERGENCY MEDICINE

## 2017-08-18 PROCEDURE — 81001 URINALYSIS AUTO W/SCOPE: CPT

## 2017-08-18 PROCEDURE — 80053 COMPREHEN METABOLIC PANEL: CPT | Performed by: EMERGENCY MEDICINE

## 2017-08-18 PROCEDURE — 85730 THROMBOPLASTIN TIME PARTIAL: CPT | Performed by: EMERGENCY MEDICINE

## 2017-08-18 PROCEDURE — 87186 SC STD MICRODIL/AGAR DIL: CPT

## 2017-08-18 PROCEDURE — 36415 COLL VENOUS BLD VENIPUNCTURE: CPT | Performed by: EMERGENCY MEDICINE

## 2017-08-18 PROCEDURE — 87040 BLOOD CULTURE FOR BACTERIA: CPT | Performed by: EMERGENCY MEDICINE

## 2017-08-18 PROCEDURE — 82607 VITAMIN B-12: CPT | Performed by: NURSE PRACTITIONER

## 2017-08-18 PROCEDURE — 71010 HB CHEST X-RAY 1 VIEW FRONTAL (PORTABLE): CPT

## 2017-08-18 RX ORDER — MAGNESIUM HYDROXIDE/ALUMINUM HYDROXICE/SIMETHICONE 120; 1200; 1200 MG/30ML; MG/30ML; MG/30ML
30 SUSPENSION ORAL EVERY 6 HOURS PRN
Status: DISCONTINUED | OUTPATIENT
Start: 2017-08-18 | End: 2017-08-23 | Stop reason: HOSPADM

## 2017-08-18 RX ORDER — BISACODYL 10 MG
10 SUPPOSITORY, RECTAL RECTAL DAILY PRN
Status: DISCONTINUED | OUTPATIENT
Start: 2017-08-18 | End: 2017-08-23 | Stop reason: HOSPADM

## 2017-08-18 RX ORDER — POLYETHYLENE GLYCOL 3350 17 G/17G
17 POWDER, FOR SOLUTION ORAL DAILY PRN
Status: DISCONTINUED | OUTPATIENT
Start: 2017-08-18 | End: 2017-08-23 | Stop reason: HOSPADM

## 2017-08-18 RX ORDER — MIRTAZAPINE 15 MG/1
7.5 TABLET, FILM COATED ORAL
Status: DISCONTINUED | OUTPATIENT
Start: 2017-08-18 | End: 2017-08-23 | Stop reason: HOSPADM

## 2017-08-18 RX ORDER — ACETAMINOPHEN 325 MG/1
500 TABLET ORAL EVERY 6 HOURS PRN
Status: DISCONTINUED | OUTPATIENT
Start: 2017-08-18 | End: 2017-08-23 | Stop reason: HOSPADM

## 2017-08-18 RX ORDER — FOLIC ACID 1 MG/1
1 TABLET ORAL DAILY
Status: DISCONTINUED | OUTPATIENT
Start: 2017-08-18 | End: 2017-08-23 | Stop reason: HOSPADM

## 2017-08-18 RX ORDER — HEPARIN SODIUM 5000 [USP'U]/ML
5000 INJECTION, SOLUTION INTRAVENOUS; SUBCUTANEOUS EVERY 8 HOURS SCHEDULED
Status: DISCONTINUED | OUTPATIENT
Start: 2017-08-18 | End: 2017-08-23 | Stop reason: HOSPADM

## 2017-08-18 RX ORDER — CHOLECALCIFEROL (VITAMIN D3) 125 MCG
500 CAPSULE ORAL DAILY
Status: DISCONTINUED | OUTPATIENT
Start: 2017-08-18 | End: 2017-08-23 | Stop reason: HOSPADM

## 2017-08-18 RX ORDER — SERTRALINE HYDROCHLORIDE 25 MG/1
25 TABLET, FILM COATED ORAL DAILY
Status: DISCONTINUED | OUTPATIENT
Start: 2017-08-18 | End: 2017-08-18

## 2017-08-18 RX ORDER — SODIUM CHLORIDE 9 MG/ML
50 INJECTION, SOLUTION INTRAVENOUS CONTINUOUS
Status: DISCONTINUED | OUTPATIENT
Start: 2017-08-18 | End: 2017-08-18

## 2017-08-18 RX ORDER — FERROUS SULFATE 325(65) MG
325 TABLET ORAL
Status: DISCONTINUED | OUTPATIENT
Start: 2017-08-18 | End: 2017-08-23 | Stop reason: HOSPADM

## 2017-08-18 RX ORDER — ONDANSETRON 4 MG/1
4 TABLET, FILM COATED ORAL EVERY 8 HOURS PRN
COMMUNITY
End: 2021-06-05 | Stop reason: HOSPADM

## 2017-08-18 RX ORDER — ONDANSETRON 2 MG/ML
4 INJECTION INTRAMUSCULAR; INTRAVENOUS EVERY 6 HOURS PRN
Status: DISCONTINUED | OUTPATIENT
Start: 2017-08-18 | End: 2017-08-23 | Stop reason: HOSPADM

## 2017-08-18 RX ORDER — SODIUM CHLORIDE 9 MG/ML
100 INJECTION, SOLUTION INTRAVENOUS CONTINUOUS
Status: DISCONTINUED | OUTPATIENT
Start: 2017-08-18 | End: 2017-08-18

## 2017-08-18 RX ORDER — SODIUM CHLORIDE 9 MG/ML
100 INJECTION, SOLUTION INTRAVENOUS CONTINUOUS
Status: DISCONTINUED | OUTPATIENT
Start: 2017-08-18 | End: 2017-08-19

## 2017-08-18 RX ADMIN — FERROUS SULFATE TAB 325 MG (65 MG ELEMENTAL FE) 325 MG: 325 (65 FE) TAB at 22:03

## 2017-08-18 RX ADMIN — MIRTAZAPINE 7.5 MG: 15 TABLET, FILM COATED ORAL at 22:03

## 2017-08-18 RX ADMIN — SODIUM CHLORIDE 500 ML: 0.9 INJECTION, SOLUTION INTRAVENOUS at 09:30

## 2017-08-18 RX ADMIN — HEPARIN SODIUM 5000 UNITS: 5000 INJECTION, SOLUTION INTRAVENOUS; SUBCUTANEOUS at 18:39

## 2017-08-18 RX ADMIN — CYANOCOBALAMIN TAB 500 MCG 500 MCG: 500 TAB at 18:40

## 2017-08-18 RX ADMIN — CEFTRIAXONE 1000 MG: 1 INJECTION, POWDER, FOR SOLUTION INTRAMUSCULAR; INTRAVENOUS at 12:05

## 2017-08-18 RX ADMIN — SODIUM CHLORIDE 100 ML/HR: 0.9 INJECTION, SOLUTION INTRAVENOUS at 15:30

## 2017-08-18 RX ADMIN — METOPROLOL TARTRATE 12.5 MG: 25 TABLET ORAL at 18:38

## 2017-08-18 RX ADMIN — HEPARIN SODIUM 5000 UNITS: 5000 INJECTION, SOLUTION INTRAVENOUS; SUBCUTANEOUS at 22:02

## 2017-08-18 RX ADMIN — FOLIC ACID 1 MG: 1 TABLET ORAL at 18:39

## 2017-08-18 RX ADMIN — SODIUM CHLORIDE 100 ML/HR: 0.9 INJECTION, SOLUTION INTRAVENOUS at 21:04

## 2017-08-18 RX ADMIN — SODIUM CHLORIDE 100 ML/HR: 0.9 INJECTION, SOLUTION INTRAVENOUS at 10:42

## 2017-08-19 LAB
ALBUMIN SERPL BCP-MCNC: 2.1 G/DL (ref 3.5–5)
ALP SERPL-CCNC: 215 U/L (ref 46–116)
ALT SERPL W P-5'-P-CCNC: 24 U/L (ref 12–78)
ANION GAP SERPL CALCULATED.3IONS-SCNC: 10 MMOL/L (ref 4–13)
AST SERPL W P-5'-P-CCNC: 24 U/L (ref 5–45)
BILIRUB SERPL-MCNC: 0.3 MG/DL (ref 0.2–1)
BUN SERPL-MCNC: 12 MG/DL (ref 5–25)
CALCIUM SERPL-MCNC: 8.2 MG/DL (ref 8.3–10.1)
CHLORIDE SERPL-SCNC: 104 MMOL/L (ref 100–108)
CO2 SERPL-SCNC: 22 MMOL/L (ref 21–32)
CREAT SERPL-MCNC: 0.77 MG/DL (ref 0.6–1.3)
ERYTHROCYTE [DISTWIDTH] IN BLOOD BY AUTOMATED COUNT: 16.2 % (ref 11.6–15.1)
GFR SERPL CREATININE-BSD FRML MDRD: 69 ML/MIN/1.73SQ M
GLUCOSE SERPL-MCNC: 76 MG/DL (ref 65–140)
HCT VFR BLD AUTO: 29.2 % (ref 34.8–46.1)
HGB BLD-MCNC: 9.7 G/DL (ref 11.5–15.4)
MCH RBC QN AUTO: 30 PG (ref 26.8–34.3)
MCHC RBC AUTO-ENTMCNC: 33.2 G/DL (ref 31.4–37.4)
MCV RBC AUTO: 90 FL (ref 82–98)
PLATELET # BLD AUTO: 306 THOUSANDS/UL (ref 149–390)
PMV BLD AUTO: 9 FL (ref 8.9–12.7)
POTASSIUM SERPL-SCNC: 3.4 MMOL/L (ref 3.5–5.3)
PROT SERPL-MCNC: 5.9 G/DL (ref 6.4–8.2)
RBC # BLD AUTO: 3.23 MILLION/UL (ref 3.81–5.12)
SODIUM SERPL-SCNC: 136 MMOL/L (ref 136–145)
WBC # BLD AUTO: 4.62 THOUSAND/UL (ref 4.31–10.16)

## 2017-08-19 PROCEDURE — 97163 PT EVAL HIGH COMPLEX 45 MIN: CPT

## 2017-08-19 PROCEDURE — G8979 MOBILITY GOAL STATUS: HCPCS

## 2017-08-19 PROCEDURE — G8978 MOBILITY CURRENT STATUS: HCPCS

## 2017-08-19 PROCEDURE — G8987 SELF CARE CURRENT STATUS: HCPCS

## 2017-08-19 PROCEDURE — 97167 OT EVAL HIGH COMPLEX 60 MIN: CPT

## 2017-08-19 PROCEDURE — 80053 COMPREHEN METABOLIC PANEL: CPT | Performed by: PHYSICIAN ASSISTANT

## 2017-08-19 PROCEDURE — 85027 COMPLETE CBC AUTOMATED: CPT | Performed by: PHYSICIAN ASSISTANT

## 2017-08-19 PROCEDURE — 97112 NEUROMUSCULAR REEDUCATION: CPT

## 2017-08-19 PROCEDURE — G8988 SELF CARE GOAL STATUS: HCPCS

## 2017-08-19 RX ORDER — POTASSIUM CHLORIDE 20 MEQ/1
20 TABLET, EXTENDED RELEASE ORAL DAILY
Status: DISCONTINUED | OUTPATIENT
Start: 2017-08-20 | End: 2017-08-23

## 2017-08-19 RX ORDER — SERTRALINE HYDROCHLORIDE 25 MG/1
25 TABLET, FILM COATED ORAL DAILY
Status: DISCONTINUED | OUTPATIENT
Start: 2017-08-20 | End: 2017-08-23 | Stop reason: HOSPADM

## 2017-08-19 RX ADMIN — FOLIC ACID 1 MG: 1 TABLET ORAL at 08:32

## 2017-08-19 RX ADMIN — CYANOCOBALAMIN TAB 500 MCG 500 MCG: 500 TAB at 08:31

## 2017-08-19 RX ADMIN — ACETAMINOPHEN 488 MG: 325 TABLET, FILM COATED ORAL at 08:30

## 2017-08-19 RX ADMIN — SODIUM CHLORIDE 100 ML/HR: 0.9 INJECTION, SOLUTION INTRAVENOUS at 06:05

## 2017-08-19 RX ADMIN — FERROUS SULFATE TAB 325 MG (65 MG ELEMENTAL FE) 325 MG: 325 (65 FE) TAB at 21:41

## 2017-08-19 RX ADMIN — HEPARIN SODIUM 5000 UNITS: 5000 INJECTION, SOLUTION INTRAVENOUS; SUBCUTANEOUS at 14:44

## 2017-08-19 RX ADMIN — METOPROLOL TARTRATE 12.5 MG: 25 TABLET ORAL at 08:30

## 2017-08-19 RX ADMIN — CEFTRIAXONE 1000 MG: 1 INJECTION, POWDER, FOR SOLUTION INTRAMUSCULAR; INTRAVENOUS at 12:56

## 2017-08-19 RX ADMIN — HEPARIN SODIUM 5000 UNITS: 5000 INJECTION, SOLUTION INTRAVENOUS; SUBCUTANEOUS at 05:58

## 2017-08-19 RX ADMIN — MIRTAZAPINE 7.5 MG: 15 TABLET, FILM COATED ORAL at 21:41

## 2017-08-19 RX ADMIN — METOPROLOL TARTRATE 12.5 MG: 25 TABLET ORAL at 18:00

## 2017-08-19 RX ADMIN — HEPARIN SODIUM 5000 UNITS: 5000 INJECTION, SOLUTION INTRAVENOUS; SUBCUTANEOUS at 21:42

## 2017-08-20 RX ADMIN — FOLIC ACID 1 MG: 1 TABLET ORAL at 09:30

## 2017-08-20 RX ADMIN — MIRTAZAPINE 7.5 MG: 15 TABLET, FILM COATED ORAL at 21:36

## 2017-08-20 RX ADMIN — HEPARIN SODIUM 5000 UNITS: 5000 INJECTION, SOLUTION INTRAVENOUS; SUBCUTANEOUS at 13:13

## 2017-08-20 RX ADMIN — POTASSIUM CHLORIDE 20 MEQ: 1500 TABLET, EXTENDED RELEASE ORAL at 09:29

## 2017-08-20 RX ADMIN — METOPROLOL TARTRATE 12.5 MG: 25 TABLET ORAL at 19:25

## 2017-08-20 RX ADMIN — METOPROLOL TARTRATE 12.5 MG: 25 TABLET ORAL at 09:29

## 2017-08-20 RX ADMIN — CYANOCOBALAMIN TAB 500 MCG 500 MCG: 500 TAB at 09:30

## 2017-08-20 RX ADMIN — HEPARIN SODIUM 5000 UNITS: 5000 INJECTION, SOLUTION INTRAVENOUS; SUBCUTANEOUS at 21:37

## 2017-08-20 RX ADMIN — SERTRALINE HYDROCHLORIDE 25 MG: 25 TABLET ORAL at 09:30

## 2017-08-20 RX ADMIN — CEFTRIAXONE 1000 MG: 1 INJECTION, POWDER, FOR SOLUTION INTRAMUSCULAR; INTRAVENOUS at 13:13

## 2017-08-20 RX ADMIN — FERROUS SULFATE TAB 325 MG (65 MG ELEMENTAL FE) 325 MG: 325 (65 FE) TAB at 21:36

## 2017-08-20 RX ADMIN — HEPARIN SODIUM 5000 UNITS: 5000 INJECTION, SOLUTION INTRAVENOUS; SUBCUTANEOUS at 05:49

## 2017-08-21 LAB
ANION GAP SERPL CALCULATED.3IONS-SCNC: 10 MMOL/L (ref 4–13)
BACTERIA UR CULT: NORMAL
BACTERIA UR CULT: NORMAL
BUN SERPL-MCNC: 11 MG/DL (ref 5–25)
CALCIUM SERPL-MCNC: 8.8 MG/DL (ref 8.3–10.1)
CHLORIDE SERPL-SCNC: 103 MMOL/L (ref 100–108)
CO2 SERPL-SCNC: 25 MMOL/L (ref 21–32)
CREAT SERPL-MCNC: 0.81 MG/DL (ref 0.6–1.3)
ERYTHROCYTE [DISTWIDTH] IN BLOOD BY AUTOMATED COUNT: 16.1 % (ref 11.6–15.1)
GFR SERPL CREATININE-BSD FRML MDRD: 65 ML/MIN/1.73SQ M
GLUCOSE SERPL-MCNC: 87 MG/DL (ref 65–140)
HCT VFR BLD AUTO: 34.6 % (ref 34.8–46.1)
HGB BLD-MCNC: 11.8 G/DL (ref 11.5–15.4)
MCH RBC QN AUTO: 30.3 PG (ref 26.8–34.3)
MCHC RBC AUTO-ENTMCNC: 34.1 G/DL (ref 31.4–37.4)
MCV RBC AUTO: 89 FL (ref 82–98)
PLATELET # BLD AUTO: 305 THOUSANDS/UL (ref 149–390)
PMV BLD AUTO: 9.1 FL (ref 8.9–12.7)
POTASSIUM SERPL-SCNC: 3.6 MMOL/L (ref 3.5–5.3)
RBC # BLD AUTO: 3.89 MILLION/UL (ref 3.81–5.12)
SODIUM SERPL-SCNC: 138 MMOL/L (ref 136–145)
WBC # BLD AUTO: 6.83 THOUSAND/UL (ref 4.31–10.16)

## 2017-08-21 PROCEDURE — 85027 COMPLETE CBC AUTOMATED: CPT | Performed by: INTERNAL MEDICINE

## 2017-08-21 PROCEDURE — 80048 BASIC METABOLIC PNL TOTAL CA: CPT | Performed by: INTERNAL MEDICINE

## 2017-08-21 RX ADMIN — HEPARIN SODIUM 5000 UNITS: 5000 INJECTION, SOLUTION INTRAVENOUS; SUBCUTANEOUS at 13:12

## 2017-08-21 RX ADMIN — METOPROLOL TARTRATE 12.5 MG: 25 TABLET ORAL at 17:37

## 2017-08-21 RX ADMIN — SERTRALINE HYDROCHLORIDE 25 MG: 25 TABLET ORAL at 09:33

## 2017-08-21 RX ADMIN — FOLIC ACID 1 MG: 1 TABLET ORAL at 09:32

## 2017-08-21 RX ADMIN — HEPARIN SODIUM 5000 UNITS: 5000 INJECTION, SOLUTION INTRAVENOUS; SUBCUTANEOUS at 22:45

## 2017-08-21 RX ADMIN — FERROUS SULFATE TAB 325 MG (65 MG ELEMENTAL FE) 325 MG: 325 (65 FE) TAB at 22:44

## 2017-08-21 RX ADMIN — MIRTAZAPINE 7.5 MG: 15 TABLET, FILM COATED ORAL at 22:44

## 2017-08-21 RX ADMIN — CEFTRIAXONE 1000 MG: 1 INJECTION, POWDER, FOR SOLUTION INTRAMUSCULAR; INTRAVENOUS at 13:11

## 2017-08-21 RX ADMIN — METOPROLOL TARTRATE 12.5 MG: 25 TABLET ORAL at 09:32

## 2017-08-21 RX ADMIN — POTASSIUM CHLORIDE 20 MEQ: 1500 TABLET, EXTENDED RELEASE ORAL at 09:33

## 2017-08-21 RX ADMIN — CYANOCOBALAMIN TAB 500 MCG 500 MCG: 500 TAB at 09:33

## 2017-08-22 ENCOUNTER — APPOINTMENT (INPATIENT)
Dept: PHYSICAL THERAPY | Facility: HOSPITAL | Age: 82
DRG: 698 | End: 2017-08-22
Payer: MEDICARE

## 2017-08-22 PROCEDURE — 97110 THERAPEUTIC EXERCISES: CPT

## 2017-08-22 PROCEDURE — 97116 GAIT TRAINING THERAPY: CPT

## 2017-08-22 RX ADMIN — CEFTRIAXONE 1000 MG: 1 INJECTION, POWDER, FOR SOLUTION INTRAMUSCULAR; INTRAVENOUS at 12:29

## 2017-08-22 RX ADMIN — METOPROLOL TARTRATE 12.5 MG: 25 TABLET ORAL at 17:59

## 2017-08-22 RX ADMIN — HEPARIN SODIUM 5000 UNITS: 5000 INJECTION, SOLUTION INTRAVENOUS; SUBCUTANEOUS at 06:10

## 2017-08-22 RX ADMIN — MIRTAZAPINE 7.5 MG: 15 TABLET, FILM COATED ORAL at 21:50

## 2017-08-22 RX ADMIN — SERTRALINE HYDROCHLORIDE 25 MG: 25 TABLET ORAL at 09:56

## 2017-08-22 RX ADMIN — FERROUS SULFATE TAB 325 MG (65 MG ELEMENTAL FE) 325 MG: 325 (65 FE) TAB at 21:50

## 2017-08-22 RX ADMIN — METOPROLOL TARTRATE 12.5 MG: 25 TABLET ORAL at 09:56

## 2017-08-22 RX ADMIN — POTASSIUM CHLORIDE 20 MEQ: 1500 TABLET, EXTENDED RELEASE ORAL at 09:56

## 2017-08-22 RX ADMIN — FOLIC ACID 1 MG: 1 TABLET ORAL at 09:56

## 2017-08-22 RX ADMIN — HEPARIN SODIUM 5000 UNITS: 5000 INJECTION, SOLUTION INTRAVENOUS; SUBCUTANEOUS at 16:05

## 2017-08-22 RX ADMIN — CYANOCOBALAMIN TAB 500 MCG 500 MCG: 500 TAB at 09:56

## 2017-08-22 RX ADMIN — HEPARIN SODIUM 5000 UNITS: 5000 INJECTION, SOLUTION INTRAVENOUS; SUBCUTANEOUS at 21:52

## 2017-08-23 ENCOUNTER — APPOINTMENT (INPATIENT)
Dept: PHYSICAL THERAPY | Facility: HOSPITAL | Age: 82
DRG: 698 | End: 2017-08-23
Payer: MEDICARE

## 2017-08-23 VITALS
OXYGEN SATURATION: 97 % | SYSTOLIC BLOOD PRESSURE: 110 MMHG | TEMPERATURE: 97 F | HEIGHT: 61 IN | RESPIRATION RATE: 18 BRPM | DIASTOLIC BLOOD PRESSURE: 76 MMHG | HEART RATE: 68 BPM | WEIGHT: 116.84 LBS | BODY MASS INDEX: 22.06 KG/M2

## 2017-08-23 LAB
BACTERIA BLD CULT: NORMAL
BACTERIA BLD CULT: NORMAL

## 2017-08-23 RX ORDER — CEFUROXIME AXETIL 250 MG/1
250 TABLET ORAL EVERY 12 HOURS SCHEDULED
Qty: 4 TABLET | Refills: 0 | Status: SHIPPED | OUTPATIENT
Start: 2017-08-23 | End: 2017-08-25

## 2017-08-23 RX ORDER — CEFUROXIME AXETIL 250 MG/1
250 TABLET ORAL EVERY 12 HOURS SCHEDULED
Status: DISCONTINUED | OUTPATIENT
Start: 2017-08-23 | End: 2017-08-23 | Stop reason: HOSPADM

## 2017-08-23 RX ORDER — MIRTAZAPINE 7.5 MG/1
7.5 TABLET, FILM COATED ORAL
Refills: 0
Start: 2017-08-23 | End: 2021-01-17

## 2017-08-23 RX ADMIN — HEPARIN SODIUM 5000 UNITS: 5000 INJECTION, SOLUTION INTRAVENOUS; SUBCUTANEOUS at 05:36

## 2017-08-23 RX ADMIN — POTASSIUM CHLORIDE 20 MEQ: 1500 TABLET, EXTENDED RELEASE ORAL at 08:16

## 2017-08-23 RX ADMIN — CYANOCOBALAMIN TAB 500 MCG 500 MCG: 500 TAB at 08:16

## 2017-08-23 RX ADMIN — HEPARIN SODIUM 5000 UNITS: 5000 INJECTION, SOLUTION INTRAVENOUS; SUBCUTANEOUS at 13:20

## 2017-08-23 RX ADMIN — FOLIC ACID 1 MG: 1 TABLET ORAL at 08:16

## 2017-08-23 RX ADMIN — METOPROLOL TARTRATE 12.5 MG: 25 TABLET ORAL at 08:16

## 2017-08-23 RX ADMIN — SERTRALINE HYDROCHLORIDE 25 MG: 25 TABLET ORAL at 08:16

## 2017-08-23 RX ADMIN — CEFUROXIME AXETIL 250 MG: 250 TABLET ORAL at 15:11

## 2017-08-28 ENCOUNTER — ALLSCRIPTS OFFICE VISIT (OUTPATIENT)
Dept: OTHER | Facility: OTHER | Age: 82
End: 2017-08-28

## 2017-08-28 DIAGNOSIS — R33.9 RETENTION OF URINE: ICD-10-CM

## 2017-09-29 ENCOUNTER — ALLSCRIPTS OFFICE VISIT (OUTPATIENT)
Dept: OTHER | Facility: OTHER | Age: 82
End: 2017-09-29

## 2017-11-13 ENCOUNTER — ALLSCRIPTS OFFICE VISIT (OUTPATIENT)
Dept: OTHER | Facility: OTHER | Age: 82
End: 2017-11-13

## 2017-11-14 NOTE — CONSULTS
Assessment  1  Elevated TSH (794 5) (R94 6)   2  Thyroid nodule (241 0) (E04 1)    Plan  Elevated TSH    · (1) T4, FREE; Status:Active; Requested for:2017;    Perform:[facility]; GJD:[id number]; Ordered;For:Elevated TSH; Ordered By:Vern Arriola;   · (1) TSH; Status:Active; Requested for:2017;    Perform:[facility]; [id number]; Ordered;For:Elevated TSH; Ordered By:Vern Arriola; Thyroid nodule    · US THYROID; Status:Hold For - Scheduling; Requested for:2018; Perform:[facility]; PEU:[id number];RFZJCYS;EUEPNC; Ordered By:Vern Arriola;   · Follow-up visit in 1 year Evaluation and Treatment  Follow-up  Status: Complete  Done:2017   Ordered; Thyroid nodule; Ordered By: Nicholas Mckeon Performed:  Due: 60KCR6791; Last Updated By: Jessica Cedillo; 2017 1:09:54 PM   · Follow-Up With Advanced Practitioner Evaluation and Treatment  Follow-up  Status:Complete  Done: 77VTP7386   Ordered; Thyroid nodule; Ordered By: Nicholas Mckeon Performed:  Due: 31YWT1060; Last Updated By: Jessica Cedillo; 2017 1:09:54 PM    Discussion/Summary  Discussion Summary:   1  Small thyroid nodule-repeat ultrasound in about a year  Elevated TSH-check TSH and free T4  Based on the results, we will need to decide if she needs thyroid hormone replacement  Counseling Documentation With Imm: The patient was counseled regarding diagnostic results,-- instructions for management,-- impressions  Medication SE Review and Pt Understands Tx: The treatment plan was reviewed with the patient/guardian  The patient/guardian understands and agrees with the treatment plan      Chief Complaint  Chief Complaint Free Text Note Form: Consult      History of Present Illness  Thyroid Nodule: The patient is being seen for a consultation regarding a thyroid nodule  She was referred by a primary care provider  Nodule characteristics: 4 6 mm in size, located in the left lobe, cold   The patient is currently asymptomatic  The patient is not currently being treated for this problem  Pertinent medical history:  no radiation exposure  Family history:  no thyroid cancer  Abnormal Blood Test: The history is obtained from the patient  A thyroid function panel was/were drawn  Review of Systems  Endo Adult ROS Female New Patient:  Constitutional/General: no change in ring size,-- no change in shoe size,-- no chills,-- no dizziness,-- no fainting,-- no fatigue,-- no fever,-- no forgetfulness,-- no headache,-- no loss of sleep,-- no recent weight loss,-- no nervousness,-- no numbness,-- no temperature intolerance,-- no excessive sweating-- and-- no weight gain  Muscle/Joint/Bone: no arm pain,-- no back pain,-- no hip pain,-- no leg pain,-- no foot pain,-- no neck pain,-- no hand pain,-- no shoulder pain,-- no arm weakness,-- no back weakness,-- no hip weakness,-- no leg weakness,-- no foot weakness,-- no neck weakness,-- no hand weakness,-- no shoulder weakness,-- no arm numbness,-- no back numbness,-- no hip numbness,-- no leg numbness,-- no foot numbness,-- no neck numbness,-- no hand numbness-- and-- no shoulder numbness  Gastrointestinal: constipation, but-- no diarrhea,-- no excessive hunger,-- no excessive thirst,-- no nausea,-- no poor appetite,-- no rectal bleeding,-- no stomach pain,-- no vomiting-- and-- no vomiting blood  Cardiovascular: no chest pain,-- no hypertension,-- no irregular heart beat,-- no hypotension,-- no poor circulation,-- no rapid heart beat-- and-- no ankle swelling  Eye/Ear/Nose/Throat: no bleeding gums,-- no blurred vision,-- no difficulty swallowing,-- no double vision,-- no gritty eyes,-- no hoarseness,-- no hearing loss,-- no persistent cough,-- no sinus problems,-- not seeing flashes-- and-- not seeing halos  Skin: no easy bruising,-- no hives,-- no itching,-- no change in a mole,-- no rashes,-- no scar-- and-- no slow healing sores    Genitourinary no blood in urine,-- no frequent urination,-- no night time urination-- and-- no painful urination  Genitourinary - Reproductive normal period,-- no bleeding between periods,-- no breast lump,-- no hot flashes,-- no nipple discharge,-- no vaginal discharge-- and-- not pregnant  ROS Reviewed:   ROS reviewed  Active Problems  1  Altered mental status (780 97) (R41 82)   2  Benign essential hypertension (401 1) (I10)   3  Dysphagia (787 20) (R13 10)   4  Muscle weakness (728 87) (M62 81)   5  Sick sinus syndrome (427 81) (I49 5)   6  Urinary retention (788 20) (R33 9)    Past Medical History  Active Problems And Past Medical History Reviewed: The active problems and past medical history were reviewed and updated today  Surgical History  1  History of Pacemaker Placement  Surgical History Reviewed: The surgical history was reviewed and updated today  Family History  Family History Reviewed: The family history was reviewed and updated today  Social History     · Never a smoker   · No alcohol use   ·  (V61 07) (Z63 4)  Social History Reviewed: The social history was reviewed and updated today  Current Meds   1  Acetaminophen 325 MG Oral Tablet; Therapy: 07WUK8808 to Recorded   2  Acetaminophen  MG Oral Tablet Extended Release; Therapy: 85LDI6119 to Recorded   3  Albuterol Sulfate (2 5 MG/3ML) 0 083% Inhalation Nebulization Solution; Therapy: (Recorded:28Apr2017) to Recorded   4  Bisac-Evac 10 MG Rectal Suppository; Therapy: 55XDT0591 to Recorded   5  Calcium 600 MG Oral Tablet; Therapy: 84ONB5497 to Recorded   6  Ferrous Sulfate 325 (65 Fe) MG Oral Tablet; Therapy: (Recorded:28Apr2017) to Recorded   7  Folic Acid 1 MG Oral Tablet; Therapy: (Recorded:28Apr2017) to Recorded   8  Mapap 325 MG Oral Tablet; Therapy: 84RPA3102 to Recorded   9  Metoprolol Tartrate 25 MG Oral Tablet; Therapy: (Recorded:28Apr2017) to Recorded   10  MiraLax Oral Packet; Therapy: (Recorded:28Apr2017) to Recorded   11   MiraLax Oral Powder; Therapy: 32UKU1276 to Recorded   12  Mirtazapine 7 5 MG Oral Tablet; Therapy: 25QTZ9113 to Recorded   13  Ondansetron HCl - 4 MG Oral Tablet; Therapy: 16SMB0779 to Recorded   14  Cherylene Michael; Therapy: (Recorded:28Apr2017) to Recorded   15  Sertraline HCl - 25 MG Oral Tablet; Therapy: 56YSI0754 to Recorded   16  Vitamin B-12 500 MCG Oral Tablet; Therapy: (Recorded:28Apr2017) to Recorded  Medication List Reviewed: The medication list was reviewed and updated today  Allergies  1  No Known Drug Allergies    Vitals  Vital Signs    Recorded: 70ZVJ2383 12:53PM   Heart Rate 83   Systolic 579   Diastolic 80       Physical Exam   Constitutional  General appearance: No acute distress, well appearing and well nourished  -- Sitting in a wheelchair in no apparent distress  Eyes  Conjunctiva and lids: No swelling, erythema, or discharge  Pupils: Equal, round and reactive to light  The sclera are anicteric  Extraocular movements are intact  Ears, Nose, Mouth, and Throat  External inspection of ears, nose and lips: Normal    Oropharynx: Normal with no erythema, edema, exudate or lesions  Exam of Head: The head is atraumatic and normocephalic  Neck: The neck is supple  The thyroid is normal in size with no palpable nodules  Pulmonary  Auscultation of lungs: Clear to auscultation bilaterally with normal chest expansion  Cardiovascular  Auscultation of heart: Normal rate and rhythm with no murmurs, gallops or rubs  Abdomen  Abdomen: Abdomen is soft, non-tender with normal bowel sounds  Lymphatic  Palpation of lymph nodes: No supraclavicular or suboccipital lymphadenopathy  Musculoskeletal  Inspection/palpation of joints, bones, and muscles: Muscle bulk and tone is normal    Skin  Skin and subcutaneous tissue: Normal skin temperature and color  Neurologic  Reflexes: 2+ and symmetric  Motor Strength: Strength is 5/5 bilaterally     Psychiatric  Orientation to person, place and time: Normal  Mood and affect: Affect and attention span are normal        Results/Data  Diagnostic Studies Reviewed:  Ultrasound Ultrasound of the thyroid done in September 2017 shows enlarged thyroid gland with ill-defined indeterminate left lobe thyroid nodule measuring 4 6 mm  Diagnostic Review Most recent laboratory testing done on September 13, 2017 shows a TSH of 4 58  Future Appointments    Date/Time Provider Specialty Site   11/29/2017 02:00 PM Cardiology, Device Remote   [address]   03/01/2018 10:00 AM Cardiology, Device Remote  [address]   10/05/2018 10:00 AM WYATT James   Urology [facility]  [place]       Signatures   Electronically signed by : WYATT Evans ; Nov 13 2017  1:12PM EST                       (Author)

## 2017-11-20 DIAGNOSIS — R94.6 ABNORMAL RESULTS OF THYROID FUNCTION STUDIES: ICD-10-CM

## 2017-11-29 ENCOUNTER — ALLSCRIPTS OFFICE VISIT (OUTPATIENT)
Dept: OTHER | Facility: OTHER | Age: 82
End: 2017-11-29

## 2017-11-29 ENCOUNTER — GENERIC CONVERSION - ENCOUNTER (OUTPATIENT)
Dept: OTHER | Facility: OTHER | Age: 82
End: 2017-11-29

## 2018-01-13 VITALS — SYSTOLIC BLOOD PRESSURE: 120 MMHG | HEART RATE: 80 BPM | DIASTOLIC BLOOD PRESSURE: 60 MMHG | RESPIRATION RATE: 20 BRPM

## 2018-01-13 VITALS — DIASTOLIC BLOOD PRESSURE: 80 MMHG | SYSTOLIC BLOOD PRESSURE: 132 MMHG | HEART RATE: 83 BPM

## 2018-01-16 NOTE — PROCEDURES
Procedures by Kevin Gray MD  at 2017  1:22 PM      Author:  Kevin Gray MD Service:  Neurology Author Type:  Physician     Filed:  2017  1:27 PM Date of Service:  2017  1:22 PM Status:  Signed     :  Kevin Gray MD (Physician)            Continuous Video EEG Monitoring       Patient Name:  Romulo Elliott  MRN: [id number]   :  1929 File #: Tyrel Flood -80   Age: 80 y o  Encounter #: [id number]   Date Performed: 2017    Report date: 2017          Study type: Continuous video EEG    ICD 10 diagnosis: Spells/Fit NOS R56 9    Start time: 8:00 on 17 End time: 18:04 on 17     -------------------------------------------------------------------------------------------------------------------   Patient History:  80year old found down and hypertensive  Admitted due to altered mental status and possible seizure activity  CT scan was unremarkable  Patient is intubated  Medications include:     pantoprazole 40 mg Intravenous Daily   sodium phosphate 9 mmol Intravenous Once       -------------------------------------------------------------------------------------------------------------------   Description of Procedure:  ·  32 channel digital recording with electrodes placed according to the International 10-20 system with additional  T1/T2 electrodes, EOG, EKG, and simultaneous  video  A monitoring technologist supervised the continuous recording  The recording was technically satisfactory  -------------------------------------------------------------------------------------------------------------------   Results:   Background Activity:   Patient is sedated and intubated  Sleep wake cycles were not identified during the recording  Background activity throughout the recording cosists of brief runs of low amplitude well regulated 11-12 Hz activity  Activation Procedures:   Neither hyperventilation nor photic stimulation was performed      Abnormal Findings:  No focal abnormalities nor interictal epileptiform discharges were noted  No electrographic seizures occurred during the recording  Other findings: The single lead ECG demonstrated a regular rhythm  Events:   No push buttons were activated  -------------------------------------------------------------------------------------------------------------------   Interpretation:   Normal 10 hour continuous video-EEG recording   Scribe Attestation:  Documented by David Walker, acting as a scribe for Dr Srikanth Haynes on 1/19/17 at  1:27 PM     Physician Attestation:  All documentation recorded by the scribe accurately reflects the service I personally performed and the decisions made by me  I was present during the time the encounter was recorded and have reviewed all the documentation and confirm that it is all accurate  and representative of the encounter  Tammy Gramajo MD   Medical Director  [address] [facility]  Pager # Marvin Blizzard M D    Jan 19 2017  1:27PM [facility] Standard Time

## 2018-01-18 NOTE — PROCEDURES
Procedures by Jonathan Ramirez MD  at 2017  4:57 PM      Author:  Jonathan Ramirez MD Service:  Neurology Author Type:  Physician     Filed:  2017  5:06 PM Date of Service:  2017  4:57 PM Status:  Signed     :  Jonathan Ramirez MD (Physician)            Continuous Video EEG Monitoring       Patient Name:  Juvenal Navas  MRN: [id number]   :  1929 File #: Alcides Tierney    Age: 80 y o  Encounter #: [id number]   Date Performed: 2017 to 2017    Report date: 2017          Study type: Continuous video EEG    ICD 10 diagnosis: Spells/Fit NOS R56 9    Start time: 13:23 on 17 End time: 8:00 on 17     -------------------------------------------------------------------------------------------------------------------   Patient History:  80year old found down and hypertensive  Admitted due to altered mental status and possible seizure activity  CT scan was unremarkable  Patient is intubated  Medications include:   heparin (porcine) 5,000 Units Subcutaneous Q8H [address]   pantoprazole 40 mg Intravenous Daily   potassium chloride 40 mEq Oral Once   potassium chloride 20 mEq Intravenous Once       -------------------------------------------------------------------------------------------------------------------   Description of Procedure:  ·  32 channel digital recording with electrodes placed according to the International 10-20 system with additional  T1/T2 electrodes, EOG, EKG, and simultaneous  video  A monitoring technologist supervised the continuous recording  The recording was technically satisfactory  -------------------------------------------------------------------------------------------------------------------   Results:   Background Activity:   Patient is sedated and intubated  Sleep wake cycles were not identified during the recording   Background activity throughout the recording cosists of low to mid amplitude poorly regulated intermixed 2-3 and 4-6 Hz activity  Activation Procedures:   Neither hyperventilation nor photic stimulation was performed  Abnormal Findings:  See Background Activity  Multifocal primarily independent left and right frontal triphasic waves were noted  No electrographic seizures were observed during this recording  Other findings: The single lead ECG demonstrated a regular rhythm  Events:   No push buttons were activated  -------------------------------------------------------------------------------------------------------------------   Interpretation:   Markedly abnormal 15  hour continuous video-EEG recording, due to continuous diffuse background irregular  intermixed theta and delta slowing and occasional multifocal predominantly independent left and right frontal triphasic waves  These abnormalities are consistent with moderately severe diffuse cerebral dysfunction  No electrographic seizures occurred during the recording  Scribe Attestation:  Documented by Samreen Mcdaniel, acting as a scribe for Dr Du Lynch on 1/18/17 at  5:05 PM     Physician Attestation:  All documentation recorded by the scribe accurately reflects the service I personally performed and the decisions made by me  I was present during the time the encounter was recorded and have reviewed all the documentation and confirm that it is all accurate  and representative of the encounter  Desirae Robles MD   Medical Director  [facility]  Pager # [phone] Me M [phone]    Jan 18 2017  5:06PM [facility] Standard Time

## 2018-03-01 ENCOUNTER — CLINICAL SUPPORT (OUTPATIENT)
Dept: CARDIOLOGY CLINIC | Facility: CLINIC | Age: 83
End: 2018-03-01
Payer: MEDICARE

## 2018-03-01 DIAGNOSIS — I49.5 SSS (SICK SINUS SYNDROME) (HCC): Primary | ICD-10-CM

## 2018-03-01 DIAGNOSIS — Z95.0 PRESENCE OF PERMANENT CARDIAC PACEMAKER: ICD-10-CM

## 2018-03-01 PROCEDURE — 93294 REM INTERROG EVL PM/LDLS PM: CPT | Performed by: INTERNAL MEDICINE

## 2018-03-01 PROCEDURE — 93296 REM INTERROG EVL PM/IDS: CPT | Performed by: INTERNAL MEDICINE

## 2018-03-01 NOTE — PROGRESS NOTES
CARELINK PACEMAKER TRANSMISSION:  BATTERY VOLTAGE ADEQUATE (9 YR)   AP 65 8%  < 0 1%    ALL LEAD PARAMETERS WITHIN NORMAL LIMITS   NO HIGH RATE EPISODES   NORMAL DEVICE FUNCTION  Nasim 88

## 2018-03-07 NOTE — PROGRESS NOTES
"  Discussion/Summary  Normal device function      Results/Data  Cardiac Device In Clinic [id number] 02:31PM Clyde Car     Test Name Result Flag Reference   MISCELLANEOUS COMMENT (Report)     DEVICE INTERROGATED IN THE [place] OFFICE  BATTERY VOLTAGE ADEQUATE (8 5 YR)  AP 34%,  < 0 1%  ALL LEAD PARAMETERS WITHIN NORMAL LIMITS  1 VT-NS EPISODE WITH EGM SHOWING PAT (22 BEATS @ 153 BPM)  NO OTHER SIGNIFICANT HIGH RATE EPISODES  NO PROGRAMMING CHANGES MADE TO DEVICE PARAMETERS  PACEMAKER FUNCTIONING APPROPRIATELY  RG/CP   Cardiac Electrophysiology Report      [id number] [id number]_17_1  pdf   DEVICE TYPE Pacemaker       Cardiac Electrophysiology Report [id number] 02:31PM Clyde Car     Test Name Result Flag Reference   Cardiac Electrophysiology Report      [id number]  pdf     Signatures   Electronically signed by : Jose Arreola, ; May 26 2017 11:06AM EST                       (Author)    Electronically signed by : Sia Prasad DO; May 26 2017  7:05PM EST                       (Author)    "

## 2018-03-07 NOTE — PROGRESS NOTES
"  Discussion/Summary  Normal device function      Results/Data  Cardiac Device In Clinic [id number] 08:26PM Star Rubén     Test Name Result Flag Reference   MISCELLANEOUS COMMENT (Report)     DEVICE INTERROGATED IN THE [place] OFFICE  BATTERY VOLTAGE ADEQUATE (10 5 YRS)  AP 5 6%  <0 1%  ALL LEAD PARAMETERS WITHIN NORMAL LIMITS  NO SIGNIFICANT HIGH RATE EPISODES  NO PROGRAMMING CHANGES MADE TO DEVICE PARAMETERS  WOUND CHECK: INCISION CLEAN AND DRY WITH EDGES APPROXIMATED; WOUND CARE AND RESTRICTIONS REVIEWED WITH PATIENT  PT WILL MAKE APPT FOR CARDIOLOGY F/U IN NEXT 1-2 MONTHS  CP   Cardiac Electrophysiology Report      [id number] [id number] [id number]_1  pdf   DEVICE TYPE Pacemaker       Cardiac Electrophysiology Report [id number] 08:26PM Star Rubén     Test Name Result Flag Reference   Cardiac Electrophysiology Report      [id number]  pdf     Signatures   Electronically signed by : Faustina Lamas, ; Feb 10 2017  2:56PM EST                       (Author)    Electronically signed by : Teresa Wiley DO; Feb 19 2017  4:08PM EST                       (Author)    "

## 2018-03-07 NOTE — PROGRESS NOTES
"  Discussion/Summary  Normal device function      Results/Data  Cardiac Device Remote [id number] 07:02PM Waelder Carbine     Test Name Result Flag Reference   MISCELLANEOUS COMMENT      CARELINK TRANSMISSION: BATTERY STATUS "OK"  AP 63%  0%  ALL AVAILABLE LEAD PARAMETERS WITHIN NORMAL LIMITS  NO SIGNIFICANT HIGH RATE EPISODES  NORMAL DEVICE FUNCTION  NC   Cardiac Electrophysiology Report      [id number]  pdf   DEVICE TYPE Pacemaker       Cardiac Electrophysiology Report [id number] 07:02PM Waelder Carbine     Test Name Result Flag Reference   Cardiac Electrophysiology Report      [id number]  pdf     Signatures   Electronically signed by : Dany Santoyo, ; Dec 13 2017  8:56AM EST                       (Author)    Electronically signed by : WYATT Chiu ; Dec 16 2017 11:08AM EST                       (Author)    "

## 2018-03-07 NOTE — PROGRESS NOTES
"  Discussion/Summary  Normal device function      Results/Data  Cardiac Device Remote [id number] 02:41PM Wai De La Paz     Test Name Result Flag Reference   MISCELLANEOUS COMMENT      CARELINK TRANSMISSION: BATTERY VOLTAGE ADEQUATE (9 5 YRS)  AP-42%, <0 1%  ALL AVAILABLE LEAD PARAMETERS WITHIN NORMAL LIMITS  NO SIGNIFICANT HIGH RATE EPISODES  NORMAL DEVICE FUNCTION  GV   Cardiac Electrophysiology Report      [id number]  pdf   DEVICE TYPE Pacemaker       Cardiac Electrophysiology Report [id number] 02:41PM Wai De La Paz     Test Name Result Flag Reference   Cardiac Electrophysiology Report      [id number]  pdf     Signatures   Electronically signed by : Ania Sy RN; Aug 29 2017  1:41PM EST                       (Author)    Electronically signed by : WYATT Schumacher ; Aug 29 2017  6:34PM EST                       (Author)    "

## 2018-05-25 ENCOUNTER — IN-CLINIC DEVICE VISIT (OUTPATIENT)
Dept: CARDIOLOGY CLINIC | Facility: CLINIC | Age: 83
End: 2018-05-25
Payer: MEDICARE

## 2018-05-25 DIAGNOSIS — I49.5 SSS (SICK SINUS SYNDROME) (HCC): Primary | ICD-10-CM

## 2018-05-25 DIAGNOSIS — Z95.0 PRESENCE OF CARDIAC PACEMAKER: ICD-10-CM

## 2018-05-25 PROCEDURE — 93280 PM DEVICE PROGR EVAL DUAL: CPT | Performed by: INTERNAL MEDICINE

## 2018-05-25 NOTE — PROGRESS NOTES
Results for orders placed or performed in visit on 05/25/18   Cardiac EP device report    Narrative    MDT DDD PPM  DEVICE INTERROGATED IN THE [place] OFFICE  BATTERY VOLTAGE ADEQUATE (9 YRS)  AP 58 8%,  < 0 1%  ALL LEAD PARAMETERS WITHIN NORMAL LIMITS  NO  SIGNIFICANT HIGH RATE EPISODES  NO PROGRAMMING CHANGES MADE TO DEVICE PARAMETERS  PACEMAKER FUNCTIONING APPROPRIATELY    CH/EB

## 2018-08-06 ENCOUNTER — TRANSCRIBE ORDERS (OUTPATIENT)
Dept: ADMINISTRATIVE | Facility: HOSPITAL | Age: 83
End: 2018-08-06

## 2018-08-06 DIAGNOSIS — E04.1 THYROID NODULE: Primary | ICD-10-CM

## 2018-08-27 ENCOUNTER — REMOTE DEVICE CLINIC VISIT (OUTPATIENT)
Dept: CARDIOLOGY CLINIC | Facility: CLINIC | Age: 83
End: 2018-08-27
Payer: MEDICARE

## 2018-08-27 DIAGNOSIS — Z95.0 CARDIAC PACEMAKER: ICD-10-CM

## 2018-08-27 DIAGNOSIS — I49.5 SICK SINUS SYNDROME (HCC): Primary | ICD-10-CM

## 2018-08-27 PROCEDURE — 93296 REM INTERROG EVL PM/IDS: CPT | Performed by: INTERNAL MEDICINE

## 2018-08-27 PROCEDURE — 93294 REM INTERROG EVL PM/LDLS PM: CPT | Performed by: INTERNAL MEDICINE

## 2018-08-28 NOTE — PROGRESS NOTES
Results for orders placed or performed in visit on 08/27/18   Cardiac EP device report    Narrative    MDT DDD PPM  CARELINK TRANSMISSION: BATTERY VOLTAGE ADEQUATE (8 5 YRS)  AP - 68 1%  - 0 10%  ALL AVAILABLE LEAD PARAMETERS WITHIN NORMAL LIMITS  NO SIGNIFICANT HIGH RATE EPISODES  NORMAL DEVICE FUNCTION    eb

## 2018-09-10 DIAGNOSIS — E04.1 NONTOXIC SINGLE THYROID NODULE: ICD-10-CM

## 2018-09-19 ENCOUNTER — HOSPITAL ENCOUNTER (OUTPATIENT)
Dept: ULTRASOUND IMAGING | Facility: MEDICAL CENTER | Age: 83
Discharge: HOME | End: 2018-09-19

## 2018-09-19 DIAGNOSIS — E04.1 THYROID NODULE: ICD-10-CM

## 2018-10-08 ENCOUNTER — HOSPITAL ENCOUNTER (OUTPATIENT)
Dept: ULTRASOUND IMAGING | Facility: MEDICAL CENTER | Age: 83
Discharge: HOME | End: 2018-10-08
Payer: MEDICARE

## 2018-10-08 PROCEDURE — 76536 US EXAM OF HEAD AND NECK: CPT

## 2018-11-06 NOTE — PROGRESS NOTES
11/8/2018      Chief Complaint   Patient presents with    Urinary Retention     has gray, nursing home flushes catheter x2 a day       Assessment and Plan    80 y o  female managed by Dr Nara Bailey    1  Urinary retention managed by chronic indwelling urethral catheter   - maintain indwelling urethral catheter to gravity drainage  - routine catheter care and changes every 4 weeks  - follow up 1 year with ultrasound prior      History of Present Illness  Avery Main is a 80 y o  female here for follow up evaluation of urinary retention  This is managed with a chronic indwelling urethral catheter  The patient presents today with a creatinine that is within normal limits and no complaints  There is no trouble with her routine catheter changes every 4 weeks  Her catheter is draining clear yellow urine  Review of Systems   Constitutional: Negative for activity change, chills and fever  Gastrointestinal: Negative for abdominal distention and abdominal pain  Musculoskeletal: Negative for back pain and gait problem  Psychiatric/Behavioral: Negative for behavioral problems and confusion  Past Medical History  Past Medical History:   Diagnosis Date    Altered mental status     Depression     Dysphagia, oropharyngeal phase     Hypertension     Pacemaker     Urinary retention        Past Social History  Past Surgical History:   Procedure Laterality Date    CARDIAC PACEMAKER PLACEMENT       History   Smoking Status    Never Smoker   Smokeless Tobacco    Never Used       Past Family History  History reviewed  No pertinent family history  Past Social history  Social History     Social History    Marital status: Single     Spouse name: N/A    Number of children: N/A    Years of education: N/A     Occupational History    Not on file       Social History Main Topics    Smoking status: Never Smoker    Smokeless tobacco: Never Used    Alcohol use No    Drug use: No    Sexual activity: No     Other Topics Concern    Not on file     Social History Narrative    No narrative on file       Current Medications  Current Outpatient Prescriptions   Medication Sig Dispense Refill    acetaminophen (TYLENOL) 500 mg tablet Take 500 mg by mouth every 6 (six) hours as needed for mild pain      bisacodyl (DULCOLAX) 10 mg suppository Insert 10 mg into the rectum daily      ferrous sulfate 325 (65 Fe) mg tablet Take 325 mg by mouth daily at bedtime      folic acid (FOLVITE) 1 mg tablet Take 1 mg by mouth daily      Magnesium Hydroxide (MILK OF MAGNESIA PO) Take 30 mL by mouth daily as needed      metoprolol tartrate (LOPRESSOR) 25 mg tablet Take 12 5 mg by mouth 2 (two) times a day      polyethylene glycol (MIRALAX) 17 g packet Take 17 g by mouth daily as needed (Constipation) for up to 30 days 510 g 0    albuterol (2 5 mg/3 mL) 0 083 % nebulizer solution Take by nebulization every 6 (six) hours as needed for wheezing      Calcium Carbonate (CALCIUM 600 PO) Take 600 mg by mouth 2 (two) times a day      cyanocobalamin (VITAMIN B-12) 500 mcg tablet Take 500 mcg by mouth daily      mirtazapine (REMERON) 7 5 MG tablet Take 1 tablet by mouth daily at bedtime (Patient not taking: Reported on 11/8/2018 )  0    ondansetron (ZOFRAN) 4 mg tablet Take 4 mg by mouth every 8 (eight) hours as needed for nausea or vomiting      sertraline (ZOLOFT) 25 mg tablet Take 25 mg by mouth daily       No current facility-administered medications for this visit          Allergies  No Known Allergies      The following portions of the patient's history were reviewed and updated as appropriate: allergies, current medications, past medical history, past social history, past surgical history and problem list       Vitals  Vitals:    11/08/18 1126   BP: 130/86   BP Location: Left arm   Patient Position: Sitting   Cuff Size: Standard   Pulse: 60   Weight: 55 8 kg (123 lb)   Height: 5' 6" (1 676 m)           Physical Exam  Constitutional   General appearance: Patient is seated and in no acute distress, well appearing and well nourished  Head and Face   Head and face: Normal     Eyes   Conjunctiva and lids: No erythema, swelling or discharge  Ears, Nose, Mouth, and Throat   Hearing: Normal     Pulmonary   Respiratory effort: No increased work of breathing or signs of respiratory distress  Cardiovascular   Examination of extremities for edema and/or varicosities: Normal     Abdomen   Abdomen: Non-tender, no masses  Musculoskeletal   Gait and station:  Wheelchair-bound  Skin   Skin and subcutaneous tissue: Warm, dry, and intact  No visible lesions or rashes  Psychiatric   Judgment and insight: Normal  Recent and remote memory:  Normal  Mood and affect: Normal      Results  No results found for this or any previous visit (from the past 1 hour(s))  ]  No results found for: PSA  Lab Results   Component Value Date    GLUCOSE 103 01/17/2017    CALCIUM 8 8 08/21/2017    K 3 6 08/21/2017    CO2 25 08/21/2017     08/21/2017    BUN 11 08/21/2017    CREATININE 0 81 08/21/2017     Lab Results   Component Value Date    WBC 6 83 08/21/2017    HGB 11 8 08/21/2017    HCT 34 6 (L) 08/21/2017    MCV 89 08/21/2017     08/21/2017       Orders  Orders Placed This Encounter   Procedures    US kidney and bladder     Standing Status:   Future     Standing Expiration Date:   11/8/2022     Scheduling Instructions:      "Prep required if being scheduled in conjunction with other studies, refer to those examination's Preps first before scheduling  All patients for US Kidney and Bladder they must drink 24 oz of water 60 minutes before your scheduled appointment time  This test requires you to have a FULL bladder  Please do not urinate before your test             Please bring your physician order, insurance cards, a form of photo ID and a list of your medications with you   Arrive 15 minutes prior to your appointment time in order to register              If you need to have lab work or a urinalysis, please do this AFTER your ultrasound  "     Order Specific Question:   Reason for Exam:     Answer:   nephrolithiasis

## 2018-11-08 ENCOUNTER — OFFICE VISIT (OUTPATIENT)
Dept: UROLOGY | Facility: CLINIC | Age: 83
End: 2018-11-08
Payer: MEDICARE

## 2018-11-08 VITALS
DIASTOLIC BLOOD PRESSURE: 86 MMHG | SYSTOLIC BLOOD PRESSURE: 130 MMHG | HEIGHT: 66 IN | BODY MASS INDEX: 19.77 KG/M2 | WEIGHT: 123 LBS | HEART RATE: 60 BPM

## 2018-11-08 DIAGNOSIS — R33.9 URINARY RETENTION: Primary | ICD-10-CM

## 2018-11-08 PROCEDURE — 99213 OFFICE O/P EST LOW 20 MIN: CPT | Performed by: PHYSICIAN ASSISTANT

## 2018-11-14 ENCOUNTER — OFFICE VISIT (OUTPATIENT)
Dept: ENDOCRINOLOGY | Facility: CLINIC | Age: 83
End: 2018-11-14
Payer: MEDICARE

## 2018-11-14 VITALS
WEIGHT: 152 LBS | HEART RATE: 62 BPM | HEIGHT: 66 IN | SYSTOLIC BLOOD PRESSURE: 126 MMHG | BODY MASS INDEX: 24.43 KG/M2 | DIASTOLIC BLOOD PRESSURE: 74 MMHG

## 2018-11-14 DIAGNOSIS — R79.89 ELEVATED TSH: ICD-10-CM

## 2018-11-14 DIAGNOSIS — E04.1 THYROID NODULE: Primary | ICD-10-CM

## 2018-11-14 PROCEDURE — 99213 OFFICE O/P EST LOW 20 MIN: CPT | Performed by: PHYSICIAN ASSISTANT

## 2018-11-14 NOTE — PROGRESS NOTES
Established Patient Progress Note       Chief Complaint   Patient presents with    thyroid nodule        History of Present Illness:     Rubia Clarke is a 80 y o  female with a history of Thyroid nodule and abnormal thyroid function testing  Repeat ultrasound of thyroid recently performed which showed no nodules  She does report fatigue and nausea and cough  Patient Active Problem List   Diagnosis    Rhabdomyolysis    Elevated troponin    Accelerated hypertension    Status post placement of cardiac pacemaker    Urinary retention    Pacemaker    Hypertension    Dysphagia, oropharyngeal phase    Altered mental status    Depression    Late onset Alzheimer's disease without behavioral disturbance    Acute encephalopathy    Urinary tract infection    Elevated TSH    Thyroid nodule      Past Medical History:   Diagnosis Date    Altered mental status     Depression     Dysphagia, oropharyngeal phase     Hypertension     Pacemaker     Urinary retention       Past Surgical History:   Procedure Laterality Date    CARDIAC PACEMAKER PLACEMENT        History reviewed  No pertinent family history    Social History   Substance Use Topics    Smoking status: Never Smoker    Smokeless tobacco: Never Used    Alcohol use No     No Known Allergies    Current Outpatient Prescriptions:     acetaminophen (TYLENOL) 500 mg tablet, Take 500 mg by mouth every 6 (six) hours as needed for mild pain, Disp: , Rfl:     albuterol (2 5 mg/3 mL) 0 083 % nebulizer solution, Take by nebulization every 6 (six) hours as needed for wheezing, Disp: , Rfl:     bisacodyl (DULCOLAX) 10 mg suppository, Insert 10 mg into the rectum daily, Disp: , Rfl:     Calcium Carbonate (CALCIUM 600 PO), Take 600 mg by mouth 2 (two) times a day, Disp: , Rfl:     cyanocobalamin (VITAMIN B-12) 500 mcg tablet, Take 500 mcg by mouth daily, Disp: , Rfl:     ferrous sulfate 325 (65 Fe) mg tablet, Take 325 mg by mouth daily at bedtime, Disp: , Rfl:     folic acid (FOLVITE) 1 mg tablet, Take 1 mg by mouth daily, Disp: , Rfl:     Magnesium Hydroxide (MILK OF MAGNESIA PO), Take 30 mL by mouth daily as needed, Disp: , Rfl:     metoprolol tartrate (LOPRESSOR) 25 mg tablet, Take 12 5 mg by mouth 2 (two) times a day, Disp: , Rfl:     ondansetron (ZOFRAN) 4 mg tablet, Take 4 mg by mouth every 8 (eight) hours as needed for nausea or vomiting, Disp: , Rfl:     sertraline (ZOLOFT) 25 mg tablet, Take 25 mg by mouth daily, Disp: , Rfl:     mirtazapine (REMERON) 7 5 MG tablet, Take 1 tablet by mouth daily at bedtime (Patient not taking: Reported on 11/8/2018 ), Disp: , Rfl: 0    polyethylene glycol (MIRALAX) 17 g packet, Take 17 g by mouth daily as needed (Constipation) for up to 30 days, Disp: 510 g, Rfl: 0    Review of Systems   Constitutional: Positive for fatigue  Negative for activity change, appetite change, chills, diaphoresis, fever and unexpected weight change  HENT: Negative for trouble swallowing and voice change  Eyes: Negative for visual disturbance  Respiratory: Positive for cough  Negative for shortness of breath  Cardiovascular: Negative for chest pain and palpitations  Gastrointestinal: Positive for nausea  Negative for abdominal pain, constipation and diarrhea  Endocrine: Negative for cold intolerance, heat intolerance, polydipsia, polyphagia and polyuria  Genitourinary: Negative for frequency and menstrual problem  Musculoskeletal: Negative for arthralgias and myalgias  Skin: Negative for rash  Allergic/Immunologic: Negative for food allergies  Neurological: Negative for dizziness and tremors  Hematological: Negative for adenopathy  Psychiatric/Behavioral: Negative for sleep disturbance  All other systems reviewed and are negative  Physical Exam:  Body mass index is 24 53 kg/m²    /74   Pulse 62   Ht 5' 6" (1 676 m)   Wt 68 9 kg (152 lb)   BMI 24 53 kg/m²    Wt Readings from Last 3 Encounters:   11/14/18 68 9 kg (152 lb)   11/08/18 55 8 kg (123 lb)   08/18/17 53 kg (116 lb 13 5 oz)       Physical Exam   Constitutional: She is oriented to person, place, and time  She appears well-developed and well-nourished  No distress  HENT:   Head: Normocephalic and atraumatic  Eyes: Pupils are equal, round, and reactive to light  Conjunctivae are normal    Neck: Normal range of motion  Neck supple  No thyromegaly present  Cardiovascular: Normal rate, regular rhythm and normal heart sounds  Pulmonary/Chest: Effort normal and breath sounds normal  No respiratory distress  She has no wheezes  She has no rales  Abdominal: Soft  Bowel sounds are normal  She exhibits no distension  There is no tenderness  Musculoskeletal: Normal range of motion  She exhibits no edema  Neurological: She is alert and oriented to person, place, and time  Skin: Skin is warm and dry  Psychiatric: She has a normal mood and affect  Vitals reviewed  Labs:   Component      Latest Ref Rng & Units 8/18/2017   TSH 3RD GENERATON      0 358 - 3 740 uIU/mL 8 155 (H)   T3, Total      0 60 - 1 80 ng/mL 1 00   Free T4      0 76 - 1 46 ng/dL 1 07       Lab Results   Component Value Date    CREATININE 0 81 08/21/2017    CREATININE 0 77 08/19/2017    CREATININE 1 07 08/18/2017    BUN 11 08/21/2017    K 3 6 08/21/2017     08/21/2017    CO2 25 08/21/2017     eGFR   Date Value Ref Range Status   08/21/2017 65 ml/min/1 73sq m Final       No results found for: CHOL, HDL, TRIG, CHOLHDL    Lab Results   Component Value Date    ALT 24 08/19/2017    AST 24 08/19/2017    ALKPHOS 215 (H) 08/19/2017       Lab Results   Component Value Date    FREET4 1 07 08/18/2017     Ultrasound 10/8/2018  FINDINGS:  Normal homogeneous smooth echotexture      Right lobe:  1 6 x 1 8 x 3 5 cm  Left lobe:  1 9 x 1 4 x 3 3 cm  Isthmus:  0 2 cm      No definite thyroid nodules are seen      IMPRESSION:     Unremarkable thyroid    Reported left-sided nodule is not visualized though prior imaging was not available for comparison        Impression & Plan:    Problem List Items Addressed This Visit     Elevated TSH     TSH slightly high 8/2017 with normal T4/T3  No results of repeat labs ordered for 11/2017  Request 11/2017 thyroid labs if available and repeat testing now  Continue to follow up with primary care provider at [facility] and would suggest treatment of hypothyroidism if TSH>10  Thyroid nodule - Primary     No abnormality on recent ultrasound  No additional imaging needed  No orders of the defined types were placed in this encounter  Patient Instructions   Repeat thyroid ultrasound showed no abnormalities  No additional imaging needed  TSH elevated 8/2018-- please send repeat labs from 11/2017 and repeat TSH/Free T4 now  Would suggest treatment of hypothyroidism if TSH >10          Discussed with the patient and all questioned fully answered  She will call me if any problems arise        Follow-up appointment if needed    Counseled patient on diagnostic results, prognosis, risk and benefit of treatment options, instruction for management, importance of treatment compliance, Risk  factor reduction and impressions      Steven Chua PA-C

## 2018-11-14 NOTE — LETTER
November 14, 2018     Becca Taylor [address] [facility] [address]    Patient: Benetta Felty   YOB: 1929   Date of Visit: 11/14/2018       Dear Dr Jay Rocha:    Thank you for referring Scott Jin to me for evaluation  Below are my notes for this consultation  If you have questions, please do not hesitate to call me  We will be available if needed but have not scheduled any additional endocrinology follow up for Magalie Arthur  Sincerely,        Nafisa Akers PA-C        CC: No Recipients  Nafisa Akers PA-C  11/14/2018  1:49 PM  Sign at close encounter    Established Patient Progress Note       Chief Complaint   Patient presents with    thyroid nodule        History of Present Illness:     Benetta Felty is a 80 y o  female with a history of Thyroid nodule and abnormal thyroid function testing  Repeat ultrasound of thyroid recently performed which showed no nodules  She does report fatigue and nausea and cough  Patient Active Problem List   Diagnosis    Rhabdomyolysis    Elevated troponin    Accelerated hypertension    Status post placement of cardiac pacemaker    Urinary retention    Pacemaker    Hypertension    Dysphagia, oropharyngeal phase    Altered mental status    Depression    Late onset Alzheimer's disease without behavioral disturbance    Acute encephalopathy    Urinary tract infection    Elevated TSH    Thyroid nodule      Past Medical History:   Diagnosis Date    Altered mental status     Depression     Dysphagia, oropharyngeal phase     Hypertension     Pacemaker     Urinary retention       Past Surgical History:   Procedure Laterality Date    CARDIAC PACEMAKER PLACEMENT        History reviewed  No pertinent family history    Social History   Substance Use Topics    Smoking status: Never Smoker    Smokeless tobacco: Never Used    Alcohol use No     No Known Allergies    Current Outpatient Prescriptions:    acetaminophen (TYLENOL) 500 mg tablet, Take 500 mg by mouth every 6 (six) hours as needed for mild pain, Disp: , Rfl:     albuterol (2 5 mg/3 mL) 0 083 % nebulizer solution, Take by nebulization every 6 (six) hours as needed for wheezing, Disp: , Rfl:     bisacodyl (DULCOLAX) 10 mg suppository, Insert 10 mg into the rectum daily, Disp: , Rfl:     Calcium Carbonate (CALCIUM 600 PO), Take 600 mg by mouth 2 (two) times a day, Disp: , Rfl:     cyanocobalamin (VITAMIN B-12) 500 mcg tablet, Take 500 mcg by mouth daily, Disp: , Rfl:     ferrous sulfate 325 (65 Fe) mg tablet, Take 325 mg by mouth daily at bedtime, Disp: , Rfl:     folic acid (FOLVITE) 1 mg tablet, Take 1 mg by mouth daily, Disp: , Rfl:     Magnesium Hydroxide (MILK OF MAGNESIA PO), Take 30 mL by mouth daily as needed, Disp: , Rfl:     metoprolol tartrate (LOPRESSOR) 25 mg tablet, Take 12 5 mg by mouth 2 (two) times a day, Disp: , Rfl:     ondansetron (ZOFRAN) 4 mg tablet, Take 4 mg by mouth every 8 (eight) hours as needed for nausea or vomiting, Disp: , Rfl:     sertraline (ZOLOFT) 25 mg tablet, Take 25 mg by mouth daily, Disp: , Rfl:     mirtazapine (REMERON) 7 5 MG tablet, Take 1 tablet by mouth daily at bedtime (Patient not taking: Reported on 11/8/2018 ), Disp: , Rfl: 0    polyethylene glycol (MIRALAX) 17 g packet, Take 17 g by mouth daily as needed (Constipation) for up to 30 days, Disp: 510 g, Rfl: 0    Review of Systems   Constitutional: Positive for fatigue  Negative for activity change, appetite change, chills, diaphoresis, fever and unexpected weight change  HENT: Negative for trouble swallowing and voice change  Eyes: Negative for visual disturbance  Respiratory: Positive for cough  Negative for shortness of breath  Cardiovascular: Negative for chest pain and palpitations  Gastrointestinal: Positive for nausea  Negative for abdominal pain, constipation and diarrhea     Endocrine: Negative for cold intolerance, heat intolerance, polydipsia, polyphagia and polyuria  Genitourinary: Negative for frequency and menstrual problem  Musculoskeletal: Negative for arthralgias and myalgias  Skin: Negative for rash  Allergic/Immunologic: Negative for food allergies  Neurological: Negative for dizziness and tremors  Hematological: Negative for adenopathy  Psychiatric/Behavioral: Negative for sleep disturbance  All other systems reviewed and are negative  Physical Exam:  Body mass index is 24 53 kg/m²  /74   Pulse 62   Ht 5' 6" (1 676 m)   Wt 68 9 kg (152 lb)   BMI 24 53 kg/m²     Wt Readings from Last 3 Encounters:   11/14/18 68 9 kg (152 lb)   11/08/18 55 8 kg (123 lb)   08/18/17 53 kg (116 lb 13 5 oz)       Physical Exam   Constitutional: She is oriented to person, place, and time  She appears well-developed and well-nourished  No distress  HENT:   Head: Normocephalic and atraumatic  Eyes: Pupils are equal, round, and reactive to light  Conjunctivae are normal    Neck: Normal range of motion  Neck supple  No thyromegaly present  Cardiovascular: Normal rate, regular rhythm and normal heart sounds  Pulmonary/Chest: Effort normal and breath sounds normal  No respiratory distress  She has no wheezes  She has no rales  Abdominal: Soft  Bowel sounds are normal  She exhibits no distension  There is no tenderness  Musculoskeletal: Normal range of motion  She exhibits no edema  Neurological: She is alert and oriented to person, place, and time  Skin: Skin is warm and dry  Psychiatric: She has a normal mood and affect  Vitals reviewed        Labs:   Component      Latest Ref Rng & Units 8/18/2017   TSH 3RD GENERATON      0 358 - 3 740 uIU/mL 8 155 (H)   T3, Total      0 60 - 1 80 ng/mL 1 00   Free T4      0 76 - 1 46 ng/dL 1 07       Lab Results   Component Value Date    CREATININE 0 81 08/21/2017    CREATININE 0 77 08/19/2017    CREATININE 1 07 08/18/2017    BUN 11 08/21/2017    K 3 6 08/21/2017  08/21/2017    CO2 25 08/21/2017     eGFR   Date Value Ref Range Status   08/21/2017 65 ml/min/1 73sq m Final       No results found for: CHOL, HDL, TRIG, CHOLHDL    Lab Results   Component Value Date    ALT 24 08/19/2017    AST 24 08/19/2017    ALKPHOS 215 (H) 08/19/2017       Lab Results   Component Value Date    FREET4 1 07 08/18/2017     Ultrasound 10/8/2018  FINDINGS:  Normal homogeneous smooth echotexture      Right lobe:  1 6 x 1 8 x 3 5 cm  Left lobe:  1 9 x 1 4 x 3 3 cm  Isthmus:  0 2 cm      No definite thyroid nodules are seen      IMPRESSION:     Unremarkable thyroid  Reported left-sided nodule is not visualized though prior imaging was not available for comparison        Impression & Plan:    Problem List Items Addressed This Visit     Elevated TSH     TSH slightly high 8/2017 with normal T4/T3  No results of repeat labs ordered for 11/2017  Request 11/2017 thyroid labs if available and repeat testing now  Continue to follow up with primary care provider at [facility] and would suggest treatment of hypothyroidism if TSH>10  Thyroid nodule - Primary     No abnormality on recent ultrasound  No additional imaging needed  No orders of the defined types were placed in this encounter  Patient Instructions   Repeat thyroid ultrasound showed no abnormalities  No additional imaging needed  TSH elevated 8/2018-- please send repeat labs from 11/2017 and repeat TSH/Free T4 now  Would suggest treatment of hypothyroidism if TSH >10          Discussed with the patient and all questioned fully answered  She will call me if any problems arise        Follow-up appointment if needed    Counseled patient on diagnostic results, prognosis, risk and benefit of treatment options, instruction for management, importance of treatment compliance, Risk  factor reduction and impressions      Dilcia Ricks PA-C

## 2018-11-14 NOTE — PATIENT INSTRUCTIONS
Repeat thyroid ultrasound showed no abnormalities  No additional imaging needed  TSH elevated 8/2018-- please send repeat labs from 11/2017 and repeat TSH/Free T4 now    Would suggest treatment of hypothyroidism if TSH >10

## 2018-11-14 NOTE — ASSESSMENT & PLAN NOTE
TSH slightly high 8/2017 with normal T4/T3  No results of repeat labs ordered for 11/2017  Request 11/2017 thyroid labs if available and repeat testing now  Continue to follow up with primary care provider at [facility] and would suggest treatment of hypothyroidism if TSH>10

## 2019-02-28 ENCOUNTER — REMOTE DEVICE CLINIC VISIT (OUTPATIENT)
Dept: CARDIOLOGY CLINIC | Facility: CLINIC | Age: 84
End: 2019-02-28
Payer: MEDICARE

## 2019-02-28 DIAGNOSIS — I49.5 SSS (SICK SINUS SYNDROME) (HCC): Primary | ICD-10-CM

## 2019-02-28 DIAGNOSIS — Z95.0 PRESENCE OF CARDIAC PACEMAKER: ICD-10-CM

## 2019-02-28 PROCEDURE — 93296 REM INTERROG EVL PM/IDS: CPT | Performed by: INTERNAL MEDICINE

## 2019-02-28 PROCEDURE — 93294 REM INTERROG EVL PM/LDLS PM: CPT | Performed by: INTERNAL MEDICINE

## 2019-02-28 NOTE — PROGRESS NOTES
Results for orders placed or performed in visit on 02/28/19   Cardiac EP device report    Narrative    MDT DDD PPM  [employer] TRANSMISSION: BATTERY VOLTAGE ADEQUATE (8 YRS)  AP: 68 3%  : <0 1%  ALL AVAILABLE LEAD PARAMETERS WITHIN NORMAL LIMITS  NO SIGNIFICANT HIGH RATE EPISODES  PACEMAKER FUNCTIONING APPROPRIATELY    [address] [place] [address]

## 2019-05-15 ENCOUNTER — NURSING HOME VISIT (OUTPATIENT)
Dept: GERIATRICS | Facility: OTHER | Age: 84
End: 2019-05-15
Payer: MEDICARE

## 2019-05-15 DIAGNOSIS — R33.9 URINARY RETENTION: ICD-10-CM

## 2019-05-15 DIAGNOSIS — R05.9 COUGH: Primary | ICD-10-CM

## 2019-05-15 DIAGNOSIS — E87.1 HYPONATREMIA: ICD-10-CM

## 2019-05-15 DIAGNOSIS — G30.1 LATE ONSET ALZHEIMER'S DISEASE WITHOUT BEHAVIORAL DISTURBANCE (HCC): ICD-10-CM

## 2019-05-15 DIAGNOSIS — I10 ESSENTIAL HYPERTENSION: ICD-10-CM

## 2019-05-15 DIAGNOSIS — F02.80 LATE ONSET ALZHEIMER'S DISEASE WITHOUT BEHAVIORAL DISTURBANCE (HCC): ICD-10-CM

## 2019-05-15 PROCEDURE — 99309 SBSQ NF CARE MODERATE MDM 30: CPT | Performed by: FAMILY MEDICINE

## 2019-05-19 ENCOUNTER — TELEPHONE (OUTPATIENT)
Dept: OTHER | Facility: OTHER | Age: 84
End: 2019-05-19

## 2019-05-20 ENCOUNTER — NURSING HOME VISIT (OUTPATIENT)
Dept: GERIATRICS | Facility: OTHER | Age: 84
End: 2019-05-20
Payer: MEDICARE

## 2019-05-20 DIAGNOSIS — R05.9 COUGH: ICD-10-CM

## 2019-05-20 DIAGNOSIS — I10 ESSENTIAL HYPERTENSION: Primary | ICD-10-CM

## 2019-05-20 DIAGNOSIS — E87.1 HYPONATREMIA: ICD-10-CM

## 2019-05-20 DIAGNOSIS — K59.09 OTHER CONSTIPATION: ICD-10-CM

## 2019-05-20 PROCEDURE — 99308 SBSQ NF CARE LOW MDM 20: CPT | Performed by: FAMILY MEDICINE

## 2019-06-26 ENCOUNTER — NURSING HOME VISIT (OUTPATIENT)
Dept: GERIATRICS | Facility: OTHER | Age: 84
End: 2019-06-26
Payer: MEDICARE

## 2019-06-26 DIAGNOSIS — Z95.0 PACEMAKER: ICD-10-CM

## 2019-06-26 DIAGNOSIS — G31.84 MILD COGNITIVE IMPAIRMENT: ICD-10-CM

## 2019-06-26 DIAGNOSIS — R33.9 URINARY RETENTION: ICD-10-CM

## 2019-06-26 DIAGNOSIS — E87.1 HYPONATREMIA: ICD-10-CM

## 2019-06-26 DIAGNOSIS — I10 ESSENTIAL HYPERTENSION: Primary | ICD-10-CM

## 2019-06-26 PROCEDURE — 99309 SBSQ NF CARE MODERATE MDM 30: CPT | Performed by: FAMILY MEDICINE

## 2019-07-24 ENCOUNTER — NURSING HOME VISIT (OUTPATIENT)
Dept: GERIATRICS | Facility: OTHER | Age: 84
End: 2019-07-24
Payer: MEDICARE

## 2019-07-24 DIAGNOSIS — I10 ESSENTIAL HYPERTENSION: ICD-10-CM

## 2019-07-24 DIAGNOSIS — G31.84 MILD COGNITIVE IMPAIRMENT: ICD-10-CM

## 2019-07-24 DIAGNOSIS — E87.1 HYPONATREMIA: Primary | ICD-10-CM

## 2019-07-24 DIAGNOSIS — K59.09 OTHER CONSTIPATION: ICD-10-CM

## 2019-07-24 DIAGNOSIS — R33.9 URINARY RETENTION: ICD-10-CM

## 2019-07-24 PROCEDURE — 99308 SBSQ NF CARE LOW MDM 20: CPT | Performed by: FAMILY MEDICINE

## 2019-07-24 NOTE — PROGRESS NOTES
[facility]  [address]  [phone]  Facility:[place] [address]        NAME: Mariel Peace  AGE: 80 y o  SEX: female    DATE OF ENCOUNTER: 7/24/2019    Chief Complaint     Pt has no complaint at this time  History of Present Illness     HPI    The following portions of the patient's history were reviewed and updated as appropriate (from facility chart and hospital records): allergies, current medications, past family history, past medical history, past social history, past surgical history and problem list   Pt was seen and examined for f/u on MCI, HTN,constipation, Urinary retention, Hyponatremia, Pt continues with NH care, walks with staff, on restorative care  Pt has no current issues or concerns,   Her last Na level was checked on 07/17/19  Wt is stable since Feb  BP is stable  Pt reports that her BM was toward loose side yesterday but today is back to base/normal  No other issues or concerns  No issues with gray  Review of Systems     Review of Systems   Constitutional: Negative  HENT: Negative  Eyes: Negative  Respiratory: Negative  Cardiovascular: Negative  Gastrointestinal: Negative  "BM is loose today"   Endocrine: Negative  Genitourinary: Negative  Musculoskeletal: Negative  Skin: Negative  Allergic/Immunologic: Negative  Neurological: Negative  Hematological: Negative  Psychiatric/Behavioral: Negative  All other systems reviewed and are negative        Active Problem List     Patient Active Problem List   Diagnosis    Rhabdomyolysis    Elevated troponin    Accelerated hypertension    Status post placement of cardiac pacemaker    Urinary retention    Pacemaker    Hypertension    Dysphagia, oropharyngeal phase    Altered mental status    Depression    Late onset Alzheimer's disease without behavioral disturbance    Acute encephalopathy    Urinary tract infection    Elevated TSH    Thyroid nodule    Cough    Hyponatremia    Other constipation    Mild cognitive impairment       Objective     Vitals:BP:132/76        WT: 145 6Ibs    Physical Exam   Constitutional: She is oriented to person, place, and time  She appears well-developed and well-nourished  No distress  HENT:   Head: Normocephalic and atraumatic  Right Ear: External ear normal    Left Ear: External ear normal    Nose: Nose normal    Mouth/Throat: Oropharynx is clear and moist  No oropharyngeal exudate  [place]   Eyes: Pupils are equal, round, and reactive to light  Conjunctivae and EOM are normal  Right eye exhibits no discharge  Left eye exhibits no discharge  No scleral icterus  Neck: Normal range of motion  Neck supple  Cardiovascular: Normal rate, regular rhythm, normal heart sounds and intact distal pulses  Exam reveals no gallop and no friction rub  No murmur heard  Pulmonary/Chest: Effort normal and breath sounds normal  No stridor  No respiratory distress  She has no wheezes  She has no rales  She exhibits no tenderness  Abdominal: Soft  Bowel sounds are normal  She exhibits no distension and no mass  There is no tenderness  There is no rebound and no guarding  No hernia  Bergeron bag  to the floor  Genitourinary:   Genitourinary Comments: Deferred  Musculoskeletal: Normal range of motion  She exhibits edema  She exhibits no tenderness or deformity  IN WC, limited ROM while sitting in bed  Lymphadenopathy:     She has no cervical adenopathy  Neurological: She is alert and oriented to person, place, and time  A cranial nerve deficit is present  Forgetful  [place]  Skin: Skin is warm and dry  No rash noted  She is not diaphoretic  No erythema  No pallor  I didn't examine sacral area  Psychiatric: She has a normal mood and affect  Her behavior is normal    Nursing note and vitals reviewed        Pertinent Laboratory/Diagnostic Studies:  BMP on 07/17/19, H&H 5/20/19, B12, Folate, Iron studies done in december    Current Medications   Medication list in facility chart was reviewed and no changes made  Assessment and Plan     Hyponatremia  Last BMP was done on 19, stable  Will have lab next week  Hypertension  BP has been stable, on Metoprolol and Amlodipine  Mild cognitive impairment  Needs NH care  Stable  Urinary retention  2nd to neurogenic bladder, no issues with gray cath  Other constipation  Pt reports that BM was toward loose side yesterday but it is stable today  Name: Bhumi Kern  : 1929  MRN: [id number]  DOS: 2019  BILLING CODE: [id number]  Diagnoses:   Diagnosis ICD-10-CM Associated Orders   1  Hyponatremia E87 1    2  Essential hypertension I10    3  Mild cognitive impairment G31 84    4  Urinary retention R33 9    5   Other constipation K59 09          Eddie Lofton MD  /46032:97 PM

## 2019-07-29 ENCOUNTER — NURSING HOME VISIT (OUTPATIENT)
Dept: GERIATRICS | Facility: OTHER | Age: 84
End: 2019-07-29
Payer: MEDICARE

## 2019-07-29 DIAGNOSIS — R40.0 SOMNOLENCE: Primary | ICD-10-CM

## 2019-07-29 DIAGNOSIS — E87.1 HYPONATREMIA: ICD-10-CM

## 2019-07-29 PROCEDURE — 99308 SBSQ NF CARE LOW MDM 20: CPT | Performed by: FAMILY MEDICINE

## 2019-07-29 NOTE — PROGRESS NOTES
[facility]  [address]  [phone]  Facility: [place] [address]          NAME: Melecio Muller  AGE: 80 y o  SEX: female    DATE OF ENCOUNTER: 7/29/2019    Chief Complaint     "I'm tired"    History of Present Illness     HPI    The following portions of the patient's history were reviewed and updated as appropriate (from facility chart and hospital records): allergies, current medications, past family history, past medical history, past social history, past surgical history and problem list  Pt was seen and examined per staff request regarding recurrence of one of her episodes when she gets lethargic and not eating and being weak, with low grade temp, pt's Tmax has been 99 9, I had a verbal order for pt to be started on SQ fluid replacement, pt's Na is 126 today which is slightly lower than the one done on  07/17/19  Pt reports fatigue, and is only drinking broth, has decreased appetite  Per staff pt has been having some coughing  No SOB, No BERNAL, no other issues  Review of Systems     Review of Systems   Constitutional: Positive for activity change, appetite change and fatigue  Active Problem List     Patient Active Problem List   Diagnosis    Rhabdomyolysis    Elevated troponin    Accelerated hypertension    Status post placement of cardiac pacemaker    Urinary retention    Pacemaker    Hypertension    Dysphagia, oropharyngeal phase    Altered mental status    Depression    Late onset Alzheimer's disease without behavioral disturbance    Acute encephalopathy    Urinary tract infection    Elevated TSH    Thyroid nodule    Cough    Hyponatremia    Other constipation    Mild cognitive impairment       Objective     Vitals: Temp:99 WY:63 RR:20    Physical Exam   Constitutional: She appears well-developed and well-nourished  No distress  Appears fatigued, and drowsy  HENT:   Head: Normocephalic and atraumatic     Right Ear: External ear normal  Left Ear: External ear normal    Nose: Nose normal    Mouth/Throat: Oropharynx is clear and moist  No oropharyngeal exudate  [place]  Eyes: Pupils are equal, round, and reactive to light  Conjunctivae and EOM are normal  Right eye exhibits no discharge  Left eye exhibits no discharge  No scleral icterus  Neck: Normal range of motion  Neck supple  No thyromegaly present  Cardiovascular: Normal rate, regular rhythm, normal heart sounds and intact distal pulses  Exam reveals no gallop and no friction rub  No murmur heard  Pulmonary/Chest: Effort normal and breath sounds normal  No stridor  No respiratory distress  She has no wheezes  She has no rales  She exhibits no tenderness  Abdominal: Soft  Bowel sounds are normal  She exhibits no distension and no mass  There is no tenderness  There is no rebound and no guarding  No hernia  Had BM yesterday,    Genitourinary:   Genitourinary Comments: Deferred  Musculoskeletal: She exhibits no edema, tenderness or deformity  In Bed limited ROM, sitting in bed  Lymphadenopathy:     She has no cervical adenopathy  Skin: Skin is warm and dry  No rash noted  She is not diaphoretic  No erythema  No pallor  Pertinent Laboratory/Diagnostic Studies:  BMP was done today  Current Medications   Medication list in facility chart was reviewed and necessary changes made  Assessment and Plan   Altered mental status  On robitussin, vitals check, SQ fluid replacement, close monitoring  Hyponatremia  Na level today was done and reviewed, will monitor closely with NACL SQ replacement  Name: Alicia Cross  : 1929  MRN: [id number]  DOS: 2019  BILLING CODE: [id number]  Diagnoses:   Diagnosis ICD-10-CM Associated Orders   1  Somnolence R40 0    2   Hyponatremia E87 1          Jon Delarosa MD  02754:05 PM

## 2019-07-31 ENCOUNTER — NURSING HOME VISIT (OUTPATIENT)
Dept: GERIATRICS | Facility: OTHER | Age: 84
End: 2019-07-31
Payer: MEDICARE

## 2019-07-31 DIAGNOSIS — R05.9 COUGH: Primary | ICD-10-CM

## 2019-07-31 PROCEDURE — 99308 SBSQ NF CARE LOW MDM 20: CPT | Performed by: FAMILY MEDICINE

## 2019-07-31 NOTE — PROGRESS NOTES
[facility]  [address]  [phone]  Facility: [place] [address]          NAME: Asa Mccann  AGE: 80 y o  SEX: female    DATE OF ENCOUNTER: 7/31/2019    Chief Complaint     Per staff pt is not eating or drinking  Low grade temp,     History of Present Illness     HPI    The following portions of the patient's history were reviewed and updated as appropriate (from facility chart and hospital records): allergies, current medications, past family history, past medical history, past social history, past surgical history and problem list  Pt was seen and examined for f/u with recent change of mental status and poor appetite, pt still not eating much and not drinking, continues with SQ fluid, coughing more and had temp of 100 2 yesterday which went done to 99 after tylenol  Pt states she is not feeling well, and states she doesn't have any appetite  Review of Systems     Review of Systems   Constitutional:        "I don't have appetite"   HENT: Negative  Respiratory: Positive for cough  Active Problem List     Patient Active Problem List   Diagnosis    Rhabdomyolysis    Elevated troponin    Accelerated hypertension    Status post placement of cardiac pacemaker    Urinary retention    Pacemaker    Hypertension    Dysphagia, oropharyngeal phase    Altered mental status    Depression    Late onset Alzheimer's disease without behavioral disturbance    Acute encephalopathy    Urinary tract infection    Elevated TSH    Thyroid nodule    Cough    Hyponatremia    Other constipation    Mild cognitive impairment       Objective     Vitals: BP:174/74  Temp:97 8 with Tmax;100 2    Physical Exam   Constitutional:   Pt is slightly confused  Eyes: Pupils are equal, round, and reactive to light  Conjunctivae are normal  Right eye exhibits no discharge  Left eye exhibits no discharge  No scleral icterus  Neck: Normal range of motion  Neck supple  Cardiovascular: Normal rate, regular rhythm, normal heart sounds and intact distal pulses  Exam reveals no gallop and no friction rub  No murmur heard  Pulmonary/Chest:   Using some of her intercostal muscles, some expiratory wheezing  Genitourinary:   Genitourinary Comments: deferred   Musculoskeletal: Normal range of motion  She exhibits no tenderness or deformity  In bed limited ROM>    Lymphadenopathy:     She has no cervical adenopathy  Neurological:   Confused, follows simple commands  Pertinent Laboratory/Diagnostic Studies:  No labs for this visit  Current Medications   Medication list in facility chart was reviewed and necessary changes made  Assessment and Plan     Cough  With wheezing, using accessory muscles, will add Levaquin one dose for possible bronchitis, pneumonia, will check CXR stat, continue SQ fluid, will check labs  Close monitoring  Name: Ephriam Salines  : 1929  MRN: [id number]  DOS: 2019  BILLING CODE: [id number]  Diagnoses:   Diagnosis ICD-10-CM Associated Orders   1   Cough Mayelin Bledsoe MD  83593:66 PM

## 2019-07-31 NOTE — ASSESSMENT & PLAN NOTE
With wheezing, using accessory muscles, and some mental changes,will add Levaquin one dose for possible bronchitis, pneumonia, will check CXR stat, continue SQ fluid, will check labs  Close monitoring

## 2019-08-03 ENCOUNTER — TELEPHONE (OUTPATIENT)
Dept: OTHER | Facility: OTHER | Age: 84
End: 2019-08-03

## 2019-08-03 ENCOUNTER — HOSPITAL ENCOUNTER (INPATIENT)
Facility: HOSPITAL | Age: 84
LOS: 4 days | Discharge: NURSING, REHAB OR LONG-TERM CARE | DRG: 871 | End: 2019-08-07
Attending: EMERGENCY MEDICINE | Admitting: STUDENT IN AN ORGANIZED HEALTH CARE EDUCATION/TRAINING PROGRAM
Payer: MEDICARE

## 2019-08-03 ENCOUNTER — APPOINTMENT (EMERGENCY)
Dept: RADIOLOGY | Facility: HOSPITAL | Age: 84
DRG: 871 | End: 2019-08-03
Payer: MEDICARE

## 2019-08-03 DIAGNOSIS — J18.0 ACUTE BRONCHOPNEUMONIA: ICD-10-CM

## 2019-08-03 DIAGNOSIS — I50.31 ACUTE DIASTOLIC CONGESTIVE HEART FAILURE (HCC): ICD-10-CM

## 2019-08-03 DIAGNOSIS — R40.0 SOMNOLENCE: ICD-10-CM

## 2019-08-03 DIAGNOSIS — G30.1 LATE ONSET ALZHEIMER'S DISEASE WITHOUT BEHAVIORAL DISTURBANCE (HCC): ICD-10-CM

## 2019-08-03 DIAGNOSIS — A41.9 SEPSIS (HCC): ICD-10-CM

## 2019-08-03 DIAGNOSIS — J18.9 PNEUMONIA: Primary | ICD-10-CM

## 2019-08-03 DIAGNOSIS — R06.02 SOB (SHORTNESS OF BREATH): ICD-10-CM

## 2019-08-03 DIAGNOSIS — J96.00 ACUTE RESPIRATORY FAILURE, UNSPECIFIED WHETHER WITH HYPOXIA OR HYPERCAPNIA (HCC): ICD-10-CM

## 2019-08-03 DIAGNOSIS — F02.80 LATE ONSET ALZHEIMER'S DISEASE WITHOUT BEHAVIORAL DISTURBANCE (HCC): ICD-10-CM

## 2019-08-03 PROBLEM — R82.90 ABNORMAL URINALYSIS: Status: ACTIVE | Noted: 2019-08-03

## 2019-08-03 PROBLEM — R06.03 ACUTE RESPIRATORY DISTRESS: Status: ACTIVE | Noted: 2019-08-03

## 2019-08-03 LAB
ALBUMIN SERPL BCP-MCNC: 1.8 G/DL (ref 3.5–5)
ALP SERPL-CCNC: 260 U/L (ref 46–116)
ALT SERPL W P-5'-P-CCNC: 17 U/L (ref 12–78)
ANION GAP SERPL CALCULATED.3IONS-SCNC: 8 MMOL/L (ref 4–13)
ANISOCYTOSIS BLD QL SMEAR: PRESENT
APTT PPP: 36 SECONDS (ref 23–37)
AST SERPL W P-5'-P-CCNC: 27 U/L (ref 5–45)
BACTERIA UR QL AUTO: ABNORMAL /HPF
BASE EX.OXY STD BLDV CALC-SCNC: 95.2 % (ref 60–80)
BASE EXCESS BLDV CALC-SCNC: -2.4 MMOL/L
BASOPHILS # BLD MANUAL: 0 THOUSAND/UL (ref 0–0.1)
BASOPHILS NFR MAR MANUAL: 0 % (ref 0–1)
BILIRUB SERPL-MCNC: 0.46 MG/DL (ref 0.2–1)
BILIRUB UR QL STRIP: NEGATIVE
BUN SERPL-MCNC: 16 MG/DL (ref 5–25)
CALCIUM SERPL-MCNC: 8.7 MG/DL (ref 8.3–10.1)
CHLORIDE SERPL-SCNC: 108 MMOL/L (ref 100–108)
CLARITY UR: ABNORMAL
CO2 SERPL-SCNC: 24 MMOL/L (ref 21–32)
COLOR UR: YELLOW
CREAT SERPL-MCNC: 0.77 MG/DL (ref 0.6–1.3)
EOSINOPHIL # BLD MANUAL: 0.23 THOUSAND/UL (ref 0–0.4)
EOSINOPHIL NFR BLD MANUAL: 2 % (ref 0–6)
ERYTHROCYTE [DISTWIDTH] IN BLOOD BY AUTOMATED COUNT: 13.6 % (ref 11.6–15.1)
GFR SERPL CREATININE-BSD FRML MDRD: 68 ML/MIN/1.73SQ M
GLUCOSE SERPL-MCNC: 120 MG/DL (ref 65–140)
GLUCOSE UR STRIP-MCNC: NEGATIVE MG/DL
HCO3 BLDV-SCNC: 23.6 MMOL/L (ref 24–30)
HCT VFR BLD AUTO: 33.3 % (ref 34.8–46.1)
HGB BLD-MCNC: 10.6 G/DL (ref 11.5–15.4)
HGB UR QL STRIP.AUTO: ABNORMAL
INR PPP: 1.32 (ref 0.84–1.19)
KETONES UR STRIP-MCNC: NEGATIVE MG/DL
L PNEUMO1 AG UR QL IA.RAPID: NEGATIVE
LACTATE SERPL-SCNC: 0.7 MMOL/L (ref 0.5–2)
LEUKOCYTE ESTERASE UR QL STRIP: ABNORMAL
LYMPHOCYTES # BLD AUTO: 0.93 THOUSAND/UL (ref 0.6–4.47)
LYMPHOCYTES # BLD AUTO: 8 % (ref 14–44)
MACROCYTES BLD QL AUTO: PRESENT
MCH RBC QN AUTO: 32.6 PG (ref 26.8–34.3)
MCHC RBC AUTO-ENTMCNC: 31.8 G/DL (ref 31.4–37.4)
MCV RBC AUTO: 103 FL (ref 82–98)
MONOCYTES # BLD AUTO: 0.35 THOUSAND/UL (ref 0–1.22)
MONOCYTES NFR BLD: 3 % (ref 4–12)
NEUTROPHILS # BLD MANUAL: 10.09 THOUSAND/UL (ref 1.85–7.62)
NEUTS BAND NFR BLD MANUAL: 9 % (ref 0–8)
NEUTS SEG NFR BLD AUTO: 78 % (ref 43–75)
NITRITE UR QL STRIP: NEGATIVE
NON-SQ EPI CELLS URNS QL MICRO: ABNORMAL /HPF
NRBC BLD AUTO-RTO: 0 /100 WBCS
NT-PROBNP SERPL-MCNC: 5837 PG/ML
O2 CT BLDV-SCNC: 13.6 ML/DL
PCO2 BLDV: 46 MM HG (ref 42–50)
PH BLDV: 7.33 [PH] (ref 7.3–7.4)
PH UR STRIP.AUTO: 6 [PH]
PLATELET # BLD AUTO: 350 THOUSANDS/UL (ref 149–390)
PLATELET BLD QL SMEAR: ADEQUATE
PMV BLD AUTO: 8.5 FL (ref 8.9–12.7)
PO2 BLDV: 83.9 MM HG (ref 35–45)
POTASSIUM SERPL-SCNC: 3.6 MMOL/L (ref 3.5–5.3)
PROCALCITONIN SERPL-MCNC: 0.18 NG/ML
PROT SERPL-MCNC: 6.1 G/DL (ref 6.4–8.2)
PROT UR STRIP-MCNC: ABNORMAL MG/DL
PROTHROMBIN TIME: 16.6 SECONDS (ref 11.6–14.5)
RBC # BLD AUTO: 3.25 MILLION/UL (ref 3.81–5.12)
RBC #/AREA URNS AUTO: ABNORMAL /HPF
S PNEUM AG UR QL: NEGATIVE
SODIUM SERPL-SCNC: 140 MMOL/L (ref 136–145)
SP GR UR STRIP.AUTO: 1.02 (ref 1–1.03)
TOTAL CELLS COUNTED SPEC: 100
UROBILINOGEN UR QL STRIP.AUTO: 2 E.U./DL
WBC # BLD AUTO: 11.6 THOUSAND/UL (ref 4.31–10.16)
WBC #/AREA URNS AUTO: ABNORMAL /HPF

## 2019-08-03 PROCEDURE — 94660 CPAP INITIATION&MGMT: CPT

## 2019-08-03 PROCEDURE — 99285 EMERGENCY DEPT VISIT HI MDM: CPT | Performed by: EMERGENCY MEDICINE

## 2019-08-03 PROCEDURE — 0T9B70Z DRAINAGE OF BLADDER WITH DRAINAGE DEVICE, VIA NATURAL OR ARTIFICIAL OPENING: ICD-10-PCS | Performed by: INTERNAL MEDICINE

## 2019-08-03 PROCEDURE — 85007 BL SMEAR W/DIFF WBC COUNT: CPT | Performed by: EMERGENCY MEDICINE

## 2019-08-03 PROCEDURE — 85027 COMPLETE CBC AUTOMATED: CPT | Performed by: EMERGENCY MEDICINE

## 2019-08-03 PROCEDURE — 82805 BLOOD GASES W/O2 SATURATION: CPT | Performed by: PHYSICIAN ASSISTANT

## 2019-08-03 PROCEDURE — 87086 URINE CULTURE/COLONY COUNT: CPT | Performed by: EMERGENCY MEDICINE

## 2019-08-03 PROCEDURE — 94762 N-INVAS EAR/PLS OXIMTRY CONT: CPT

## 2019-08-03 PROCEDURE — 96365 THER/PROPH/DIAG IV INF INIT: CPT

## 2019-08-03 PROCEDURE — 83605 ASSAY OF LACTIC ACID: CPT | Performed by: EMERGENCY MEDICINE

## 2019-08-03 PROCEDURE — 87449 NOS EACH ORGANISM AG IA: CPT | Performed by: PHYSICIAN ASSISTANT

## 2019-08-03 PROCEDURE — 96361 HYDRATE IV INFUSION ADD-ON: CPT

## 2019-08-03 PROCEDURE — 84145 PROCALCITONIN (PCT): CPT | Performed by: EMERGENCY MEDICINE

## 2019-08-03 PROCEDURE — 85610 PROTHROMBIN TIME: CPT | Performed by: EMERGENCY MEDICINE

## 2019-08-03 PROCEDURE — 36415 COLL VENOUS BLD VENIPUNCTURE: CPT | Performed by: EMERGENCY MEDICINE

## 2019-08-03 PROCEDURE — 87040 BLOOD CULTURE FOR BACTERIA: CPT | Performed by: EMERGENCY MEDICINE

## 2019-08-03 PROCEDURE — 85730 THROMBOPLASTIN TIME PARTIAL: CPT | Performed by: EMERGENCY MEDICINE

## 2019-08-03 PROCEDURE — 81001 URINALYSIS AUTO W/SCOPE: CPT | Performed by: EMERGENCY MEDICINE

## 2019-08-03 PROCEDURE — 1123F ACP DISCUSS/DSCN MKR DOCD: CPT | Performed by: PHYSICIAN ASSISTANT

## 2019-08-03 PROCEDURE — 99223 1ST HOSP IP/OBS HIGH 75: CPT | Performed by: HOSPITALIST

## 2019-08-03 PROCEDURE — 83880 ASSAY OF NATRIURETIC PEPTIDE: CPT | Performed by: PHYSICIAN ASSISTANT

## 2019-08-03 PROCEDURE — 71045 X-RAY EXAM CHEST 1 VIEW: CPT

## 2019-08-03 PROCEDURE — 94640 AIRWAY INHALATION TREATMENT: CPT

## 2019-08-03 PROCEDURE — 80053 COMPREHEN METABOLIC PANEL: CPT | Performed by: EMERGENCY MEDICINE

## 2019-08-03 PROCEDURE — 99285 EMERGENCY DEPT VISIT HI MDM: CPT

## 2019-08-03 RX ORDER — FOLIC ACID 1 MG/1
1 TABLET ORAL DAILY
Status: DISCONTINUED | OUTPATIENT
Start: 2019-08-04 | End: 2019-08-07 | Stop reason: HOSPADM

## 2019-08-03 RX ORDER — SODIUM CHLORIDE 9 MG/ML
50 INJECTION, SOLUTION INTRAVENOUS CONTINUOUS
Status: DISCONTINUED | OUTPATIENT
Start: 2019-08-03 | End: 2019-08-04

## 2019-08-03 RX ORDER — ONDANSETRON 2 MG/ML
4 INJECTION INTRAMUSCULAR; INTRAVENOUS EVERY 6 HOURS PRN
Status: DISCONTINUED | OUTPATIENT
Start: 2019-08-03 | End: 2019-08-07 | Stop reason: HOSPADM

## 2019-08-03 RX ORDER — IPRATROPIUM BROMIDE AND ALBUTEROL SULFATE 2.5; .5 MG/3ML; MG/3ML
3 SOLUTION RESPIRATORY (INHALATION)
Status: DISCONTINUED | OUTPATIENT
Start: 2019-08-03 | End: 2019-08-05

## 2019-08-03 RX ORDER — CHOLECALCIFEROL (VITAMIN D3) 125 MCG
500 CAPSULE ORAL DAILY
Status: DISCONTINUED | OUTPATIENT
Start: 2019-08-04 | End: 2019-08-07 | Stop reason: HOSPADM

## 2019-08-03 RX ORDER — VANCOMYCIN HYDROCHLORIDE 1 G/200ML
15 INJECTION, SOLUTION INTRAVENOUS ONCE
Status: DISCONTINUED | OUTPATIENT
Start: 2019-08-03 | End: 2019-08-05

## 2019-08-03 RX ORDER — FERROUS SULFATE 325(65) MG
325 TABLET ORAL
Status: DISCONTINUED | OUTPATIENT
Start: 2019-08-03 | End: 2019-08-07 | Stop reason: HOSPADM

## 2019-08-03 RX ORDER — ACETAMINOPHEN 500 MG
500 TABLET ORAL EVERY 6 HOURS PRN
Status: DISCONTINUED | OUTPATIENT
Start: 2019-08-03 | End: 2019-08-03 | Stop reason: SDUPTHER

## 2019-08-03 RX ORDER — ACETAMINOPHEN 325 MG/1
650 TABLET ORAL EVERY 6 HOURS PRN
Status: DISCONTINUED | OUTPATIENT
Start: 2019-08-03 | End: 2019-08-07 | Stop reason: HOSPADM

## 2019-08-03 RX ORDER — SERTRALINE HYDROCHLORIDE 25 MG/1
25 TABLET, FILM COATED ORAL DAILY
Status: DISCONTINUED | OUTPATIENT
Start: 2019-08-04 | End: 2019-08-07 | Stop reason: HOSPADM

## 2019-08-03 RX ADMIN — METRONIDAZOLE 500 MG: 500 INJECTION, SOLUTION INTRAVENOUS at 22:21

## 2019-08-03 RX ADMIN — IPRATROPIUM BROMIDE AND ALBUTEROL SULFATE 3 ML: 2.5; .5 SOLUTION RESPIRATORY (INHALATION) at 17:02

## 2019-08-03 RX ADMIN — AZITHROMYCIN MONOHYDRATE 500 MG: 500 INJECTION, POWDER, LYOPHILIZED, FOR SOLUTION INTRAVENOUS at 16:24

## 2019-08-03 RX ADMIN — SODIUM CHLORIDE 50 ML/HR: 0.9 INJECTION, SOLUTION INTRAVENOUS at 19:08

## 2019-08-03 RX ADMIN — CEFEPIME HYDROCHLORIDE 2000 MG: 2 INJECTION, POWDER, FOR SOLUTION INTRAVENOUS at 15:36

## 2019-08-03 RX ADMIN — METRONIDAZOLE 500 MG: 500 INJECTION, SOLUTION INTRAVENOUS at 17:38

## 2019-08-03 RX ADMIN — SODIUM CHLORIDE 500 ML: 0.9 INJECTION, SOLUTION INTRAVENOUS at 13:57

## 2019-08-03 RX ADMIN — METOPROLOL TARTRATE 12.5 MG: 25 TABLET ORAL at 19:40

## 2019-08-03 NOTE — PLAN OF CARE
Problem: Potential for Falls  Goal: Patient will remain free of falls  Description  INTERVENTIONS:  - Assess patient frequently for physical needs  -  Identify cognitive and physical deficits and behaviors that affect risk of falls    -  [place] fall precautions as indicated by assessment   - Educate patient/family on patient safety including physical limitations  - Instruct patient to call for assistance with activity based on assessment  - Modify environment to reduce risk of injury  - Consider OT/PT consult to assist with strengthening/mobility  Outcome: Progressing     Problem: Prexisting or High Potential for Compromised Skin Integrity  Goal: Skin integrity is maintained or improved  Description  INTERVENTIONS:  - Identify patients at risk for skin breakdown  - Assess and monitor skin integrity  - Assess and monitor nutrition and hydration status  - Monitor labs (i e  albumin)  - Assess for incontinence   - Turn and reposition patient  - Assist with mobility/ambulation  - Relieve pressure over bony prominences  - Avoid friction and shearing  - Provide appropriate hygiene as needed including keeping skin clean and dry  - Evaluate need for skin moisturizer/barrier cream  - Collaborate with interdisciplinary team (i e  Nutrition, Rehabilitation, etc )   - Patient/family teaching  Outcome: Progressing     Problem: INFECTION - ADULT  Goal: Absence or prevention of progression during hospitalization  Description  INTERVENTIONS:  - Assess and monitor for signs and symptoms of infection  - Monitor lab/diagnostic results  - Monitor all insertion sites, i e  indwelling lines, tubes, and drains  - Monitor endotracheal (as able) and nasal secretions for changes in amount and color  - [place] appropriate cooling/warming therapies per order  - Administer medications as ordered  - Instruct and encourage patient and family to use good hand hygiene technique  - Identify and instruct in appropriate isolation precautions for identified infection/condition  Outcome: Progressing  Goal: Absence of fever/infection during neutropenic period  Description  INTERVENTIONS:  - Monitor WBC  - Implement neutropenic guidelines  Outcome: Progressing     Problem: SAFETY ADULT  Goal: Maintain or return to baseline ADL function  Description  INTERVENTIONS:  -  Assess patient's ability to carry out ADLs; assess patient's baseline for ADL function and identify physical deficits which impact ability to perform ADLs (bathing, care of mouth/teeth, toileting, grooming, dressing, etc )  - Assess/evaluate cause of self-care deficits   - Assess range of motion  - Assess patient's mobility; develop plan if impaired  - Assess patient's need for assistive devices and provide as appropriate  - Encourage maximum independence but intervene and supervise when necessary  ¯ Involve family in performance of ADLs  ¯ Assess for home care needs following discharge   ¯ Request OT consult to assist with ADL evaluation and planning for discharge  ¯ Provide patient education as appropriate  Outcome: Progressing  Goal: Maintain or return mobility status to optimal level  Description  INTERVENTIONS:  - Assess patient's baseline mobility status (ambulation, transfers, stairs, etc )    - Identify cognitive and physical deficits and behaviors that affect mobility  - Identify mobility aids required to assist with transfers and/or ambulation (gait belt, sit-to-stand, lift, walker, cane, etc )  - [place] fall precautions as indicated by assessment  - Record patient progress and toleration of activity level on Mobility SBAR; progress patient to next Phase/Stage  - Instruct patient to call for assistance with activity based on assessment  - Request Rehabilitation consult to assist with strengthening/weightbearing, etc   Outcome: Progressing     Problem: DISCHARGE PLANNING  Goal: Discharge to home or other facility with appropriate resources  Description  INTERVENTIONS:  - Identify barriers to discharge w/patient and caregiver  - Arrange for needed discharge resources and transportation as appropriate  - Identify discharge learning needs (meds, wound care, etc )  - Arrange for interpretive services to assist at discharge as needed  - Refer to Case Management Department for coordinating discharge planning if the patient needs post-hospital services based on physician/advanced practitioner order or complex needs related to functional status, cognitive ability, or social support system  Outcome: Progressing     Problem: Knowledge Deficit  Goal: Patient/family/caregiver demonstrates understanding of disease process, treatment plan, medications, and discharge instructions  Description  Complete learning assessment and assess knowledge base    Interventions:  - Provide teaching at level of understanding  - Provide teaching via preferred learning methods  Outcome: Progressing

## 2019-08-03 NOTE — ASSESSMENT & PLAN NOTE
2 * bronchopna  o2 saturation >90% on 1L NC however w/abd muscle and neck muscle use  No significant improvement w/duoneb  Start HFNC now  If no improvement can trial bipap   D/w poa if does not improve w/bipap would need to d/w withdrawal of care as pt is a strict level 3 w/polst  Continue duonebs, continue to monitor respiratory status

## 2019-08-03 NOTE — SEPSIS NOTE
Sepsis Note   Danielle Rodriguez 80 y o  female MRN: [id number]  Unit/Bed#: ED 14 Encounter: [id number]      qSOFA     Row Name 08/03/19 1445 08/03/19 1415 08/03/19 1400 08/03/19 1335 08/03/19 1325    Altered mental status GCS < 15  --  --  --  1  --    Respiratory Rate > / =22  1  1  1  --  1    Systolic BP < / =962  0  0  --  --  0    Q Sofa Score  2  1  1  2  1        Initial Sepsis Screening     Row Name 08/03/19 1400                Is the patient's history suggestive of a new or worsening infection? (!) Yes (Proceed)  -CC        Suspected source of infection  pneumonia  -CC        Are two or more of the following signs & symptoms of infection both present and new to the patient? (!) Yes (Proceed)  -CC        Indicate SIRS criteria  Altered mental status  -CC        If the answer is yes to both questions, suspicion of sepsis is present  --        If severe sepsis is present AND tissue hypoperfusion perists in the hour after fluid resuscitation or lactate > 4, the patient meets criteria for SEPTIC SHOCK  --        Are any of the following organ dysfunction criteria present within 6 hours of suspected infection and SIRS criteria that are NOT considered to be chronic conditions? No  -CC        Organ dysfunction  --        Date of presentation of severe sepsis  --        Time of presentation of severe sepsis  --        Tissue hypoperfusion persists in the hour after crystalloid fluid administration, evidenced, by either:  --        Was hypotension present within one hour of the conclusion of crystalloid fluid administration?   No  -CC        Date of presentation of septic shock  --        Time of presentation of septic shock  --          User Key  (r) = Recorded By, (t) = Taken By, (c) = Cosigned By    [address] Name Provider Type    Sendy Goldstein MD Physician               Default Flowsheet Data (last 720 hours)      Sepsis Reassess     [address] Name 08/03/19 1521                   Repeat Volume Status and Tissue Perfusion Assessment Performed    Repeat Volume Status and Tissue Perfusion Assessment Performed  Yes  -CC           Volume Status and Tissue Perfusion Post Fluid Resuscitation * Must Document All *    Vital Signs Reviewed (HR, RR, BP, T)  Yes  -CC        Shock Index Reviewed  Yes  -CC        Arterial Oxygen Saturation Reviewed (POx, SaO2 or SpO2)  Yes (comment %) 94  -CC        Cardio  Normal S1/S2; Regular rate and rhythm; No murmor  -CC        Pulmonary  (!) Tachypnea; Wheezes  -CC        Capillary Refill  Brisk  -CC        Peripheral Pulses  Radial  -CC        Peripheral Pulse  +2  -CC        Skin  Warm;Normal  -CC        Urine output assessed  Adequate  -CC           *OR*   Intensive Monitoring- Must Document One of the Following Four *:    Vital Signs Reviewed  --        * Central Venous Pressure (CVP or RAP)  --        * Central Venous Oxygen (SVO2, ScvO2 or Oxygen saturation via central catheter)  --        * Bedside Cardiovascular US in IVC diameter and % collapse  --        * Passive Leg Raise OR Crystalloid Challenge  --          User Key  (r) = Recorded By, (t) = Taken By, (c) = Cosigned By    Initials Name Provider Type    CC Alicia PresMD michael Physician

## 2019-08-03 NOTE — ASSESSMENT & PLAN NOTE
Pt was encephalopathic responsive to sternal rub  Likely 2* #1 now improved  Oriented to hospital setting and year presently    Given underlying dementia continue to reorient avoid over sedation

## 2019-08-03 NOTE — H&P
H&P- Johnny Bledsoe 6/26/1929, 80 y o  female MRN: [id number]    Unit/Bed#: ED 14 Encounter: [id number]    Primary Care Provider: Elle Eisenberg MD   Date and time admitted to hospital: 8/3/2019  1:16 PM    History and Physical - [facility]    Patient Information: Johnny Bledsoe 80 y o  female MRN: [id number]  Unit/Bed#: ED 14 Encounter: [id number]  Admitting Physician: Kathy Portillo PA-C  PCP: Elle Eisenberg MD  Date of Admission:  08/03/19    Assessment/Plan:    Hospital Problem List:     Principal Problem:    Acute bronchopneumonia  Active Problems:    Acute encephalopathy    Acute respiratory distress    Sepsis ([address] )    Status post placement of cardiac pacemaker    Urinary retention    Hypertension    Dysphagia, oropharyngeal phase    Depression    Late onset Alzheimer's disease without behavioral disturbance    Abnormal urinalysis          * Acute bronchopneumonia  Assessment & Plan  C/b sepsis and acute respiratory distress  Likely healthcare associated pneumonia vs aspiration pna  cxr w/large right sided pna  Possible LLL infiltrate or effusion limited by portable cxr  rec'd cefepime/vanc/flagyl in ed  Continue cefepime/flagyl, check legionella/strep pneumo ag sputum cx  F/u procalcitonin, bcx    Acute respiratory distress  Assessment & Plan  2 * bronchopna  o2 saturation >90% on 1L NC however w/abd muscle and neck muscle use  No significant improvement w/duoneb  Start HFNC now  If no improvement can trial bipap  D/w poa if does not improve w/bipap would need to d/w withdrawal of care as pt is a strict level 3 w/polst  Continue duonebs, continue to monitor respiratory status    Acute encephalopathy  Assessment & Plan  Pt was encephalopathic responsive to sternal rub  Likely 2* #1 now improved  Oriented to hospital setting and year presently    Given underlying dementia continue to reorient avoid over sedation    Sepsis [facility])  Assessment & Plan  poa by tachypnea leukocytosis  Outpatient temp of 100 9 at long term nursing home  Lactic acid wnl  ua dirty but pt asymptomatic, given respiratory s/sx and evidence of pna will treat for pna  Continue abx as above, continue ivf hydration    Abnormal urinalysis  Assessment & Plan  In chronic gray catheterization 2* neurogenic bladder  Likely 2* asymptomatic bacteruria  abx as above f/u ucx    Late onset Alzheimer's disease without behavioral disturbance  Assessment & Plan  At baseline relatively oriented to setting/year  Pt is level 3 dni/dnr w/polst for abx and ivf hydration but no artificial nutrition or intubation/cpr  Depression  Assessment & Plan  Continue zoloft    Dysphagia, oropharyngeal phase  Assessment & Plan  Hx of but on regular diet thin liquids  Consult SLP bedside swallow study    Hypertension  Assessment & Plan  Continue lopressor    Urinary retention  Assessment & Plan  S/p chronic gray catheter exchanged on 8/3/19 known to [address] [place] [address]    Status post placement of cardiac pacemaker  Assessment & Plan  noted              ·     VTE Prophylaxis: Enoxaparin (Lovenox)  / sequential compression device   Code Status: level 3 dni/dnr  POLST: There is no POLST form on file for this patient (pre-hospital)    Anticipated Length of Stay:  Patient will be admitted on an Inpatient basis with an anticipated length of stay of  Greater than 2 midnights  Justification for Hospital Stay: pneumonai    Total Time for Visit, including Counseling / Coordination of Care: 45 minutes  Greater than 50% of this total time spent on direct patient counseling and coordination of care  Chief Complaint:   Unresponsive  Recent dx of pna    History of Present Illness:    Jhonny Solano is a 80 y o  female who presents with PMH of Alzheimer's dementia, or pharyngeal dysphagia, chronic urinary retention status post Gray catheterization, s/p PPM hypertension, depression coming to the hospital for unresponsive status    Patient is presenting from nursing home  She is a poor historian  She presently is alert and able to follow commands oriented to hospital setting as well as year and knows who the president is  By review of nursing home documentation patient was diagnosed with a fever and bronchopneumonia 4 days prior to admission  She was started on Levaquin for this  Today she was found unresponsive by staff and only responded to painful stimuli  She was transferred to ED for evaluation  Her mentation has improved while in ED  The pt herself offers no complaints  We are asked to admit pt for pneumonia  Review of Systems:    Review of Systems   Unable to perform ROS: Mental status change       Past Medical and Surgical History:     Past Medical History:   Diagnosis Date    Altered mental status     Depression     Dysphagia, oropharyngeal phase     Hypertension     Multiple thyroid nodules     Pacemaker     Urinary retention        Past Surgical History:   Procedure Laterality Date    CARDIAC PACEMAKER PLACEMENT         Meds/Allergies:    Prior to Admission medications    Medication Sig Start Date End Date Taking?  Authorizing Provider   acetaminophen (TYLENOL) 500 mg tablet Take 500 mg by mouth every 6 (six) hours as needed for mild pain    Historical Provider, MD   albuterol (2 5 mg/3 mL) 0 083 % nebulizer solution Take by nebulization every 6 (six) hours as needed for wheezing    Historical Provider, MD   bisacodyl (DULCOLAX) 10 mg suppository Insert 10 mg into the rectum daily    Historical Provider, MD   Calcium Carbonate (CALCIUM 600 PO) Take 600 mg by mouth 2 (two) times a day    Historical Provider, MD   cyanocobalamin (VITAMIN B-12) 500 mcg tablet Take 500 mcg by mouth daily    Historical Provider, MD   ferrous sulfate 325 (65 Fe) mg tablet Take 325 mg by mouth daily at bedtime    Historical Provider, MD   folic acid (FOLVITE) 1 mg tablet Take 1 mg by mouth daily    Historical Provider, MD   Magnesium Hydroxide (MILK OF MAGNESIA PO) Take 30 mL by mouth daily as needed    Historical Provider, MD   metoprolol tartrate (LOPRESSOR) 25 mg tablet Take 12 5 mg by mouth 2 (two) times a day    Historical Provider, MD   mirtazapine (REMERON) 7 5 MG tablet Take 1 tablet by mouth daily at bedtime  Patient not taking: Reported on 11/8/2018 8/23/17   Daphne Padilla DO   ondansetron [facility] 4 mg tablet Take 4 mg by mouth every 8 (eight) hours as needed for nausea or vomiting    Historical Provider, MD   polyethylene glycol (MIRALAX) 17 g packet Take 17 g by mouth daily as needed (Constipation) for up to 30 days 1/24/17 11/8/18  Devika Declid MD   sertraline (ZOLOFT) 25 mg tablet Take 25 mg by mouth daily    Historical Provider, MD     I have reviewed home medications with patient personally  Allergies: No Known Allergies    Social History:     Marital Status: Single   Occupation:   Patient Pre-hospital Living Situation:   Patient Pre-hospital Level of Mobility:   Patient Pre-hospital Diet Restrictions:   Substance Use History:   Social History     Substance and Sexual Activity   Alcohol Use No     Social History     Tobacco Use   Smoking Status Never Smoker   Smokeless Tobacco Never Used     Social History     Substance and Sexual Activity   Drug Use No       Family History:    Family History   Problem Relation Age of Onset    No Known Problems Mother     No Known Problems Father        Physical Exam:     Vitals:   Blood Pressure: 150/69 (08/03/19 1715)  Pulse: 80 (08/03/19 1715)  Temperature: 99 3 °F (37 4 °C) (08/03/19 1325)  Temp Source: Temporal (08/03/19 1325)  Respirations: (!) 23 (08/03/19 1715)  SpO2: 97 % (08/03/19 1715)    Physical Exam   Constitutional: She appears well-developed and well-nourished  Appears ill modest wob w/abd muscle use and neck muscle rectractions   HENT:   Head: Normocephalic and atraumatic     Right Ear: External ear normal    Left Ear: External ear normal    Eyes: Conjunctivae are normal  Cardiovascular: Normal rate, regular rhythm and normal heart sounds  Exam reveals no gallop and no friction rub  No murmur heard  Pulmonary/Chest: No stridor  She has wheezes  She has no rales  Poor respiratory excursion w/modest wob  On 1L NC  Expiratory wheezing throughout right lung fields and left lower lung field on posterior auscultation   Abdominal: Soft  She exhibits no distension and no mass  There is no tenderness  There is no guarding  Musculoskeletal: She exhibits no edema  Lymphadenopathy:     She has cervical adenopathy (b/l anterior cervical chain lad 1cm, firm nontender)  Neurological: She is alert  Skin: Skin is warm  She is diaphoretic  Psychiatric: She has a normal mood and affect  Vitals reviewed  (  Be Sure to Include Physical Exam: Delete this entire line when you have entered your exam)    Additional Data:     Lab Results: I have personally reviewed pertinent reports  Results from last 7 days   Lab Units 08/03/19  1352   WBC Thousand/uL 11 60*   HEMOGLOBIN g/dL 10 6*   HEMATOCRIT % 33 3*   PLATELETS Thousands/uL 350   LYMPHO PCT % 8*   MONO PCT % 3*   EOS PCT % 2     Results from last 7 days   Lab Units 08/03/19  1352   POTASSIUM mmol/L 3 6   CHLORIDE mmol/L 108   CO2 mmol/L 24   BUN mg/dL 16   CREATININE mg/dL 0 77   CALCIUM mg/dL 8 7   ALK PHOS U/L 260*   ALT U/L 17   AST U/L 27     Results from last 7 days   Lab Units 08/03/19  1352   INR  1 32*       Imaging: I have personally reviewed pertinent reports  No results found  EKG, Pathology, and Other Studies Reviewed on Admission:   · EKG: nsr from EMS     Allscripts / Epic Records Reviewed: Yes     ** Please Note: This note has been constructed using a voice recognition system   **

## 2019-08-03 NOTE — ASSESSMENT & PLAN NOTE
poa by tachypnea leukocytosis  Outpatient temp of 100 9 at long term nursing home    Lactic acid wnl  ua dirty but pt asymptomatic, given respiratory s/sx and evidence of pna will treat for pna  Continue abx as above, continue ivf hydration

## 2019-08-03 NOTE — ASSESSMENT & PLAN NOTE
In chronic gray catheterization 2* neurogenic bladder  Likely 2* asymptomatic bacteruria  abx as above f/u ucx

## 2019-08-03 NOTE — TELEPHONE ENCOUNTER
Tiger Text Dr Rima Luna @ [phone]    Actual Text:  [phone]/ [address]asha [address] Telephone/ PT  Kanchan [facility] [phone]/ PT is not eating, not normal self     Informed caller to call service back if no response within 20 minutes

## 2019-08-03 NOTE — ED PROVIDER NOTES
History  Chief Complaint   Patient presents with    Altered Mental Status     Patient currently being treated for pneumonia at [place] [id number] home  Patient has not been responding to treatment and today patient is nonresponsive  Sent with copy of POLST  This is a 19-year-old female with a history of hypertension, urinary retention with indwelling Bergeron who presents with altered mental status  Per report, the patient has been treated with Levaquin for the past few days for a possible pneumonia  Throughout the day today, the patient has grown more unresponsive  She was brought to the emergency department for her decreased mental status  She does come with paperwork and states that she is DNR/DNI  The patient was mildly hypoxic with improvement with nasal cannula oxygen  The patient awakens to painful stimuli  She is alert and oriented x1 (to person only)  The patient has no complaints  I did speak with her nephew and Venice Medrano, over the phone  He does relate that the patient is DNR/DNI  He also states that if we do find an infection, he does believe that she would want it treated with IV antibiotics if it would improve her mental status  Prior to Admission Medications   Prescriptions Last Dose Informant Patient Reported? Taking?    Calcium Carbonate (CALCIUM 600 PO)  Outside Facility (Specify) Yes No   Sig: Take 600 mg by mouth 2 (two) times a day   Magnesium Hydroxide (MILK OF MAGNESIA PO)  Outside Facility (Specify) Yes No   Sig: Take 30 mL by mouth daily as needed   acetaminophen (TYLENOL) 500 mg tablet  Outside Facility (Specify) Yes No   Sig: Take 500 mg by mouth every 6 (six) hours as needed for mild pain   albuterol (2 5 mg/3 mL) 0 083 % nebulizer solution  Outside Facility (Specify) Yes No   Sig: Take by nebulization every 6 (six) hours as needed for wheezing   bisacodyl (DULCOLAX) 10 mg suppository  Outside Facility (Specify) Yes No   Sig: Insert 10 mg into the rectum daily cyanocobalamin (VITAMIN B-12) 500 mcg tablet  Outside Facility (Specify) Yes No   Sig: Take 500 mcg by mouth daily   ferrous sulfate 325 (65 Fe) mg tablet  Outside Facility (Specify) Yes No   Sig: Take 325 mg by mouth daily at bedtime   folic acid (FOLVITE) 1 mg tablet  Outside Facility (Specify) Yes No   Sig: Take 1 mg by mouth daily   metoprolol tartrate (LOPRESSOR) 25 mg tablet  Outside Facility (Specify) Yes No   Sig: Take 12 5 mg by mouth 2 (two) times a day   mirtazapine (REMERON) 7 5 MG tablet  Outside Facility (Specify) No No   Sig: Take 1 tablet by mouth daily at bedtime   Patient not taking: Reported on 11/8/2018    ondansetron (ZOFRAN) 4 mg tablet  Outside Facility (Specify) Yes No   Sig: Take 4 mg by mouth every 8 (eight) hours as needed for nausea or vomiting   polyethylene glycol (MIRALAX) 17 g packet  Outside Facility (Specify) No No   Sig: Take 17 g by mouth daily as needed (Constipation) for up to 30 days   sertraline (ZOLOFT) 25 mg tablet  Outside Facility (Specify) Yes No   Sig: Take 25 mg by mouth daily      Facility-Administered Medications: None       Past Medical History:   Diagnosis Date    Altered mental status     Depression     Dysphagia, oropharyngeal phase     Hypertension     Multiple thyroid nodules     Pacemaker     Urinary retention        Past Surgical History:   Procedure Laterality Date    CARDIAC PACEMAKER PLACEMENT         Family History   Problem Relation Age of Onset    No Known Problems Mother     No Known Problems Father      I have reviewed and agree with the history as documented  Social History     Tobacco Use    Smoking status: Never Smoker    Smokeless tobacco: Never Used   Substance Use Topics    Alcohol use: No    Drug use: No        Review of Systems   Unable to perform ROS: Mental status change       Physical Exam  Physical Exam   Constitutional: Vital signs are normal  She appears well-developed  She is cooperative  No distress     HENT: Mouth/Throat: Uvula is midline, oropharynx is clear and moist and mucous membranes are normal    Eyes: Pupils are equal, round, and reactive to light  Conjunctivae and EOM are normal    Neck: Trachea normal  No thyroid mass and no thyromegaly present  Cardiovascular: Normal rate, regular rhythm, normal heart sounds, intact distal pulses and normal pulses  No murmur heard  Pulmonary/Chest: Effort normal  She has wheezes  Abdominal: Soft  Normal appearance and bowel sounds are normal  There is no tenderness  There is no rebound, no guarding and no CVA tenderness  Subcutaneous catheter  Genitourinary:   Genitourinary Comments: Bergeron draining yellow urine  Neurological: She is alert  GCS eye subscore is 2  GCS verbal subscore is 4  GCS motor subscore is 6  Skin: Skin is warm, dry and intact  Psychiatric: She has a normal mood and affect   Her speech is normal and behavior is normal  Thought content normal        Vital Signs  ED Triage Vitals [08/03/19 1325]   Temperature Pulse Respirations Blood Pressure SpO2   99 3 °F (37 4 °C) 70 22 (!) 178/77 93 %      Temp Source Heart Rate Source Patient Position - Orthostatic VS BP Location FiO2 (%)   Temporal Monitor Lying Right arm --      Pain Score       No Pain           Vitals:    08/03/19 1415 08/03/19 1445 08/03/19 1515 08/03/19 1545   BP: 156/69 152/67 155/70 158/69   Pulse: 70 68 68 70   Patient Position - Orthostatic VS: Lying  Lying Lying         Visual Acuity      ED Medications  Medications   vancomycin (VANCOCIN) IVPB (premix) 1,000 mg (has no administration in time range)   cefepime (MAXIPIME) 2 g/50 mL dextrose IVPB (2,000 mg Intravenous New Bag 8/3/19 1536)   metroNIDAZOLE (FLAGYL) IVPB (premix) 500 mg (has no administration in time range)   azithromycin (ZITHROMAX) 500 mg in sodium chloride 0 9% 250mL IVPB 500 mg (has no administration in time range)   sodium chloride 0 9 % bolus 500 mL (0 mL Intravenous Stopped 8/3/19 1533)       Diagnostic Studies  Results Reviewed     Procedure Component Value Units Date/Time    Urine Microscopic [[id number]]  (Abnormal) Collected:  08/03/19 1507    Lab Status:  Final result Specimen:  Urine, Indwelling Bergeron Catheter Updated:  08/03/19 1540     RBC, UA Innumerable /hpf      WBC, UA 10-20 /hpf      Epithelial Cells Occasional /hpf      Bacteria, UA Occasional /hpf     Urine culture [[id number]] Collected:  08/03/19 1507    Lab Status: In process Specimen:  Urine, Indwelling Bergeron Catheter Updated:  08/03/19 1539    UA w Reflex to Microscopic w Reflex to Culture [[id number]]  (Abnormal) Collected:  08/03/19 1507    Lab Status:  Final result Specimen:  Urine, Indwelling Bergeron Catheter Updated:  08/03/19 1520     Color, UA Yellow     Clarity, UA Slightly Cloudy     Specific Gravity, UA 1 025     pH, UA 6 0     Leukocytes, UA Small     Nitrite, UA Negative     Protein, UA 30 (1+) mg/dl      Glucose, UA Negative mg/dl      Ketones, UA Negative mg/dl      Urobilinogen, UA 2 0 E U /dl      Bilirubin, UA Negative     Blood, UA Large    CBC and differential [[id number]]  (Abnormal) Collected:  08/03/19 1352    Lab Status:  Final result Specimen:  Blood from Hand, Right Updated:  08/03/19 1516     WBC 11 60 Thousand/uL      RBC 3 25 Million/uL      Hemoglobin 10 6 g/dL      Hematocrit 33 3 %       fL      MCH 32 6 pg      MCHC 31 8 g/dL      RDW 13 6 %      MPV 8 5 fL      Platelets 184 Thousands/uL      nRBC 0 /100 WBCs     Narrative: This is an appended report  These results have been appended to a previously verified report      Protime-INR [[id number]]  (Abnormal) Collected:  08/03/19 1352    Lab Status:  Final result Specimen:  Blood from Hand, Right Updated:  08/03/19 1439     Protime 16 6 seconds      INR 1 32    APTT [[id number]]  (Normal) Collected:  08/03/19 1352    Lab Status:  Final result Specimen:  Blood from Hand, Right Updated:  08/03/19 1439     PTT 36 seconds     Lactic Acid x2 [[id number]]  (Normal) Collected:  08/03/19 1352    Lab Status:  Final result Specimen:  Blood from Hand, Right Updated:  08/03/19 1419     LACTIC ACID 0 7 mmol/L     Narrative:       Result may be elevated if tourniquet was used during collection  Comprehensive metabolic panel [[id number]]  (Abnormal) Collected:  08/03/19 1352    Lab Status:  Final result Specimen:  Blood from Hand, Right Updated:  08/03/19 1417     Sodium 140 mmol/L      Potassium 3 6 mmol/L      Chloride 108 mmol/L      CO2 24 mmol/L      ANION GAP 8 mmol/L      BUN 16 mg/dL      Creatinine 0 77 mg/dL      Glucose 120 mg/dL      Calcium 8 7 mg/dL      AST 27 U/L      ALT 17 U/L      Alkaline Phosphatase 260 U/L      Total Protein 6 1 g/dL      Albumin 1 8 g/dL      Total Bilirubin 0 46 mg/dL      eGFR 68 ml/min/1 73sq m     Narrative:       [place] guidelines for Chronic Kidney Disease (CKD):     Stage 1 with normal or high GFR (GFR > 90 mL/min/1 73 square meters)    Stage 2 Mild CKD (GFR = 60-89 mL/min/1 73 square meters)    Stage 3A Moderate CKD (GFR = 45-59 mL/min/1 73 square meters)    Stage 3B Moderate CKD (GFR = 30-44 mL/min/1 73 square meters)    Stage 4 Severe CKD (GFR = 15-29 mL/min/1 73 square meters)    Stage 5 End Stage CKD (GFR <15 mL/min/1 73 square meters)  Note: GFR calculation is accurate only with a steady state creatinine    Procalcitonin [[id number]] Collected:  08/03/19 1352    Lab Status: In process Specimen:  Blood from Hand, Right Updated:  08/03/19 1400    Blood culture #1 [[id number]] Collected:  08/03/19 1356    Lab Status: In process Specimen:  Blood from Arm, Left Updated:  08/03/19 1359    Blood culture #2 [[id number]] Collected:  08/03/19 1352    Lab Status:   In process Specimen:  Blood from Hand, Right Updated:  08/03/19 1359                 XR chest 1 view portable    (Results Pending)              Procedures  Procedures       ED Course                   Initial Sepsis Screening     Row Name 08/03/19 1400                Is the patient's history suggestive of a new or worsening infection? (!) Yes (Proceed)  -CC        Suspected source of infection  pneumonia  -CC        Are two or more of the following signs & symptoms of infection both present and new to the patient? (!) Yes (Proceed)  -CC        Indicate SIRS criteria  Altered mental status  -CC        If the answer is yes to both questions, suspicion of sepsis is present  --        If severe sepsis is present AND tissue hypoperfusion perists in the hour after fluid resuscitation or lactate > 4, the patient meets criteria for SEPTIC SHOCK  --        Are any of the following organ dysfunction criteria present within 6 hours of suspected infection and SIRS criteria that are NOT considered to be chronic conditions? No  -CC        Organ dysfunction  --        Date of presentation of severe sepsis  --        Time of presentation of severe sepsis  --        Tissue hypoperfusion persists in the hour after crystalloid fluid administration, evidenced, by either:  --        Was hypotension present within one hour of the conclusion of crystalloid fluid administration? No  -CC        Date of presentation of septic shock  --        Time of presentation of septic shock  --          User Key  (r) = Recorded By, (t) = Taken By, (c) = Cosigned By    [address] Name Provider Type    CC Lily Kilgore MD Physician           Default Flowsheet Data (last 720 hours)      Sepsis Reassess     Row Name 08/03/19 1521                   Repeat Volume Status and Tissue Perfusion Assessment Performed    Repeat Volume Status and Tissue Perfusion Assessment Performed  Yes  -CC           Volume Status and Tissue Perfusion Post Fluid Resuscitation * Must Document All *    Vital Signs Reviewed (HR, RR, BP, T)  Yes  -CC        Shock Index Reviewed  Yes  -CC        Arterial Oxygen Saturation Reviewed (POx, SaO2 or SpO2)  Yes (comment %) 94  -CC        Cardio  Normal S1/S2; Regular rate and rhythm; No murmor  -CC        Pulmonary  (!) Tachypnea; Wheezes  -CC        Capillary Refill  Brisk  -CC        Peripheral Pulses  Radial  -CC        Peripheral Pulse  +2  -CC        Skin  Warm;Normal  -CC        Urine output assessed  Adequate  -CC           *OR*   Intensive Monitoring- Must Document One of the Following Four *:    Vital Signs Reviewed  --        * Central Venous Pressure (CVP or RAP)  --        * Central Venous Oxygen (SVO2, ScvO2 or Oxygen saturation via central catheter)  --        * Bedside Cardiovascular US in IVC diameter and % collapse  --        * Passive Leg Raise OR Crystalloid Challenge  --          User Key  (r) = Recorded By, (t) = Taken By, (c) = Cosigned By    Initials Name Provider Type    CC Janis Flores MD Physician                MDM  Number of Diagnoses or Management Options  Diagnosis management comments: Concern for sepsis likely sources include pneumonia or urinary tract infection  Will check lab work, chest x-ray, urinalysis  Plan to admit for IV antibiotics  If patient continues to worsen, I will contact Barbie Virgen for ongoing discussion of goals of care        Disposition  Final diagnoses:   Pneumonia   Somnolence   Sepsis ([facility], [place] [facility] )   SOB (shortness of breath)     Time reflects when diagnosis was documented in both MDM as applicable and the Disposition within this note     Time User Action Codes Description Comment    8/3/2019  3:56 PM Edmonlesley Dumontth Add [J18 9] Pneumonia     8/3/2019  3:56 PM Rita Clonts P Add [R40 0] Somnolence     8/3/2019  3:56 PM Rita Clonts P Add [A41 9] Sepsis ([facility], [place] [facility] )     8/3/2019  3:57 PM Rita Clonts P Add [R06 02] SOB (shortness of breath)       ED Disposition     ED Disposition Condition Date/Time Comment    Admit Stable Sat Aug 3, 2019  3:56 PM Case was discussed with Dr Rosalva Peters and the patient's admission status was agreed to be Admission Status: inpatient status to the service of Dr Rosalva Peters          Follow-up Information    None Patient's Medications   Discharge Prescriptions    No medications on file     No discharge procedures on file      ED Provider  Electronically Signed by           Sada Gerard MD  08/03/19 0066

## 2019-08-03 NOTE — ASSESSMENT & PLAN NOTE
C/b sepsis and acute respiratory distress  Likely healthcare associated pneumonia vs aspiration pna  cxr w/large right sided pna  Possible LLL infiltrate or effusion limited by portable cxr  rec'd cefepime/vanc/flagyl in ed  Continue cefepime/flagyl, check legionella/strep pneumo ag sputum cx   F/u procalcitonin, bcx

## 2019-08-04 PROBLEM — R79.89 ELEVATED BRAIN NATRIURETIC PEPTIDE (BNP) LEVEL: Status: ACTIVE | Noted: 2019-08-04

## 2019-08-04 LAB
BACTERIA UR CULT: NORMAL
BASOPHILS # BLD AUTO: 0.06 THOUSANDS/ΜL (ref 0–0.1)
BASOPHILS NFR BLD AUTO: 1 % (ref 0–1)
EOSINOPHIL # BLD AUTO: 0.17 THOUSAND/ΜL (ref 0–0.61)
EOSINOPHIL NFR BLD AUTO: 1 % (ref 0–6)
ERYTHROCYTE [DISTWIDTH] IN BLOOD BY AUTOMATED COUNT: 13.9 % (ref 11.6–15.1)
GLUCOSE SERPL-MCNC: 116 MG/DL (ref 65–140)
HCT VFR BLD AUTO: 32.9 % (ref 34.8–46.1)
HGB BLD-MCNC: 10.3 G/DL (ref 11.5–15.4)
IMM GRANULOCYTES # BLD AUTO: 0.27 THOUSAND/UL (ref 0–0.2)
IMM GRANULOCYTES NFR BLD AUTO: 2 % (ref 0–2)
LYMPHOCYTES # BLD AUTO: 1.22 THOUSANDS/ΜL (ref 0.6–4.47)
LYMPHOCYTES NFR BLD AUTO: 10 % (ref 14–44)
MCH RBC QN AUTO: 32.8 PG (ref 26.8–34.3)
MCHC RBC AUTO-ENTMCNC: 31.3 G/DL (ref 31.4–37.4)
MCV RBC AUTO: 105 FL (ref 82–98)
MONOCYTES # BLD AUTO: 0.77 THOUSAND/ΜL (ref 0.17–1.22)
MONOCYTES NFR BLD AUTO: 6 % (ref 4–12)
NEUTROPHILS # BLD AUTO: 9.63 THOUSANDS/ΜL (ref 1.85–7.62)
NEUTS SEG NFR BLD AUTO: 80 % (ref 43–75)
NRBC BLD AUTO-RTO: 0 /100 WBCS
PLATELET # BLD AUTO: 322 THOUSANDS/UL (ref 149–390)
PMV BLD AUTO: 8.4 FL (ref 8.9–12.7)
PROCALCITONIN SERPL-MCNC: 0.14 NG/ML
RBC # BLD AUTO: 3.14 MILLION/UL (ref 3.81–5.12)
WBC # BLD AUTO: 12.12 THOUSAND/UL (ref 4.31–10.16)

## 2019-08-04 PROCEDURE — 94762 N-INVAS EAR/PLS OXIMTRY CONT: CPT

## 2019-08-04 PROCEDURE — 94640 AIRWAY INHALATION TREATMENT: CPT

## 2019-08-04 PROCEDURE — 85025 COMPLETE CBC W/AUTO DIFF WBC: CPT | Performed by: PHYSICIAN ASSISTANT

## 2019-08-04 PROCEDURE — 84145 PROCALCITONIN (PCT): CPT | Performed by: PHYSICIAN ASSISTANT

## 2019-08-04 PROCEDURE — 99232 SBSQ HOSP IP/OBS MODERATE 35: CPT | Performed by: PHYSICIAN ASSISTANT

## 2019-08-04 PROCEDURE — G8997 SWALLOW GOAL STATUS: HCPCS

## 2019-08-04 PROCEDURE — 94760 N-INVAS EAR/PLS OXIMETRY 1: CPT

## 2019-08-04 PROCEDURE — G8996 SWALLOW CURRENT STATUS: HCPCS

## 2019-08-04 PROCEDURE — 82948 REAGENT STRIP/BLOOD GLUCOSE: CPT

## 2019-08-04 PROCEDURE — 94660 CPAP INITIATION&MGMT: CPT

## 2019-08-04 PROCEDURE — 92610 EVALUATE SWALLOWING FUNCTION: CPT

## 2019-08-04 RX ORDER — FUROSEMIDE 10 MG/ML
40 INJECTION INTRAMUSCULAR; INTRAVENOUS ONCE
Status: COMPLETED | OUTPATIENT
Start: 2019-08-04 | End: 2019-08-04

## 2019-08-04 RX ORDER — FUROSEMIDE 10 MG/ML
20 INJECTION INTRAMUSCULAR; INTRAVENOUS
Status: DISCONTINUED | OUTPATIENT
Start: 2019-08-05 | End: 2019-08-07 | Stop reason: HOSPADM

## 2019-08-04 RX ADMIN — SERTRALINE HYDROCHLORIDE 25 MG: 25 TABLET ORAL at 09:16

## 2019-08-04 RX ADMIN — IPRATROPIUM BROMIDE AND ALBUTEROL SULFATE 3 ML: 2.5; .5 SOLUTION RESPIRATORY (INHALATION) at 01:00

## 2019-08-04 RX ADMIN — CEFEPIME HYDROCHLORIDE 1000 MG: 1 INJECTION, POWDER, FOR SOLUTION INTRAMUSCULAR; INTRAVENOUS at 03:24

## 2019-08-04 RX ADMIN — METRONIDAZOLE 500 MG: 500 INJECTION, SOLUTION INTRAVENOUS at 22:19

## 2019-08-04 RX ADMIN — FUROSEMIDE 40 MG: 10 INJECTION, SOLUTION INTRAMUSCULAR; INTRAVENOUS at 12:17

## 2019-08-04 RX ADMIN — CYANOCOBALAMIN TAB 500 MCG 500 MCG: 500 TAB at 09:16

## 2019-08-04 RX ADMIN — METRONIDAZOLE 500 MG: 500 INJECTION, SOLUTION INTRAVENOUS at 06:10

## 2019-08-04 RX ADMIN — IPRATROPIUM BROMIDE AND ALBUTEROL SULFATE 3 ML: 2.5; .5 SOLUTION RESPIRATORY (INHALATION) at 13:00

## 2019-08-04 RX ADMIN — ENOXAPARIN SODIUM 40 MG: 40 INJECTION SUBCUTANEOUS at 09:16

## 2019-08-04 RX ADMIN — IPRATROPIUM BROMIDE AND ALBUTEROL SULFATE 3 ML: 2.5; .5 SOLUTION RESPIRATORY (INHALATION) at 19:56

## 2019-08-04 RX ADMIN — IPRATROPIUM BROMIDE AND ALBUTEROL SULFATE 3 ML: 2.5; .5 SOLUTION RESPIRATORY (INHALATION) at 07:59

## 2019-08-04 RX ADMIN — METOPROLOL TARTRATE 12.5 MG: 25 TABLET ORAL at 09:16

## 2019-08-04 RX ADMIN — METOPROLOL TARTRATE 12.5 MG: 25 TABLET ORAL at 17:23

## 2019-08-04 RX ADMIN — FERROUS SULFATE TAB 325 MG (65 MG ELEMENTAL FE) 325 MG: 325 (65 FE) TAB at 21:00

## 2019-08-04 RX ADMIN — FOLIC ACID 1 MG: 1 TABLET ORAL at 09:16

## 2019-08-04 RX ADMIN — CEFEPIME HYDROCHLORIDE 1000 MG: 1 INJECTION, POWDER, FOR SOLUTION INTRAMUSCULAR; INTRAVENOUS at 15:43

## 2019-08-04 RX ADMIN — METRONIDAZOLE 500 MG: 500 INJECTION, SOLUTION INTRAVENOUS at 14:54

## 2019-08-04 NOTE — DISCHARGE INSTR - DIET
Dysphagia 2 mechanical soft w/ thin liquids  Sips in between bites to clear esophagus  Reflux precautions  Meds in puree  Video Barium Swallow Study 8/5/19     Summary:  Pt presented w/ at least mild oral stage, wfl pharyngeal stage this study  Mastication and transfer of soft solid were moderately to mod/severe  prolonged  Pt was unable to transfer the barium pill w/ thi nliquid but transferred it w/ puree wnl  No aspiration observed this study, though pt coughed throughout the study  Per gross screening there was esophageal stasis w/ puree and soft solid that cleared w/ liquid  Images are on PACS for review       Recommendations:  Diet:dysphagia 2 mechanical soft   Liquids: thin  Meds:wgole in applesauce  Strategies: sips in between bites  Upright position  F/u ST tx: yes  Therapy Prognosis:favorable  CloseSupervision  Aspiration Precautions  Reflux Precautions  Results reviewed with: pt, nursing  Aspiration precautions posted  Seat upright 10-15 minutes prior to initiating meal to clear congestion prior to eating       Patient's goal:  Hoped everything looked ok  (results explained)     Goals:  Pt will tolerate least restrictive diet w/out s/s aspiration or oral/pharyngeal difficulties  Pt will take small sips of liquid in between purees/solids w/ min cues to clear esophageal stasis       Pt is a 89yof referred for VBS following bedside eval done yesterday       Previous VBS:  None known  Current Diet:  Dysphagia 3 and nectar  Premorbid diet:  Regular and thin  Dentition:  Upper and lower dentures  O2 requirement:  none  Vocal Quality/Speech:  Wnl, [place]  Cognitive status:  Alert and cooperative       Consistencies administered: Barium laden applesauce, soft solid, nectar thick, thin liquids, 13mm barium pill   Liquids were administered by cup and straw       Pt was seated laterally at 90 degrees       Oral stage:  Mild/mod  Lip closure:wnl  Mastication:at least moderately prolonged  Bolus formation: wfl  Bolus control:wfl/mildly reduced  Transfer:wnl  Residue:trace/none     Pharyngeal stage: WNL  Swallow promptness:prompt  Spill to valleculae:intermittent  Spill to pyriforms:none  Epiglottic inversion:complete  Laryngeal excursion:wnl  Pharyngeal constriction:wnl  Vallecular retention:mild w/ thin  Pyriform retention:mild w/ nectar and thin  PPW coating:none  CP prominence:none  Transient penetration:on thin when trying to take the pill  Epiglottic undercoat:none  Penetration:none  Aspiration:none  Strategies:liquids cleared esophageal retention     Screening of Esophageal stage:  Dysmotility  Retention  Cleared w/ thin liquid     Speech note: 12;18-12:30  Pt was seen at lunch following the vbs  She did not eat any of the sliced carrots or the ground/chopped meat and only had the mashed potatoes  Trialed thin liquids  Took multiple sips of thin liquid w/out overt s/s aspiration  No coughing observed this meal  Pt then wanted to get back into bed  Recommend seat upright 10-15 min prior to initiating meal to allow pt to clear congestion  Slow rate, sips in between bites  May need to adjust diet if pt continues to favor purees

## 2019-08-04 NOTE — PLAN OF CARE
Problem: Potential for Falls  Goal: Patient will remain free of falls  Description  INTERVENTIONS:  - Assess patient frequently for physical needs  -  Identify cognitive and physical deficits and behaviors that affect risk of falls    -  [place] fall precautions as indicated by assessment   - Educate patient/family on patient safety including physical limitations  - Instruct patient to call for assistance with activity based on assessment  - Modify environment to reduce risk of injury  - Consider OT/PT consult to assist with strengthening/mobility  Outcome: Progressing     Problem: Prexisting or High Potential for Compromised Skin Integrity  Goal: Skin integrity is maintained or improved  Description  INTERVENTIONS:  - Identify patients at risk for skin breakdown  - Assess and monitor skin integrity  - Assess and monitor nutrition and hydration status  - Monitor labs (i e  albumin)  - Assess for incontinence   - Turn and reposition patient  - Assist with mobility/ambulation  - Relieve pressure over bony prominences  - Avoid friction and shearing  - Provide appropriate hygiene as needed including keeping skin clean and dry  - Evaluate need for skin moisturizer/barrier cream  - Collaborate with interdisciplinary team (i e  Nutrition, Rehabilitation, etc )   - Patient/family teaching  Outcome: Progressing     Problem: INFECTION - ADULT  Goal: Absence or prevention of progression during hospitalization  Description  INTERVENTIONS:  - Assess and monitor for signs and symptoms of infection  - Monitor lab/diagnostic results  - Monitor all insertion sites, i e  indwelling lines, tubes, and drains  - Monitor endotracheal (as able) and nasal secretions for changes in amount and color  - [place] appropriate cooling/warming therapies per order  - Administer medications as ordered  - Instruct and encourage patient and family to use good hand hygiene technique  - Identify and instruct in appropriate isolation precautions for identified infection/condition  Outcome: Progressing  Goal: Absence of fever/infection during neutropenic period  Description  INTERVENTIONS:  - Monitor WBC  - Implement neutropenic guidelines  Outcome: Progressing     Problem: DISCHARGE PLANNING  Goal: Discharge to home or other facility with appropriate resources  Description  INTERVENTIONS:  - Identify barriers to discharge w/patient and caregiver  - Arrange for needed discharge resources and transportation as appropriate  - Identify discharge learning needs (meds, wound care, etc )  - Arrange for interpretive services to assist at discharge as needed  - Refer to Case Management Department for coordinating discharge planning if the patient needs post-hospital services based on physician/advanced practitioner order or complex needs related to functional status, cognitive ability, or social support system  Outcome: Progressing     Problem: SAFETY ADULT  Goal: Maintain or return to baseline ADL function  Description  INTERVENTIONS:  -  Assess patient's ability to carry out ADLs; assess patient's baseline for ADL function and identify physical deficits which impact ability to perform ADLs (bathing, care of mouth/teeth, toileting, grooming, dressing, etc )  - Assess/evaluate cause of self-care deficits   - Assess range of motion  - Assess patient's mobility; develop plan if impaired  - Assess patient's need for assistive devices and provide as appropriate  - Encourage maximum independence but intervene and supervise when necessary  ¯ Involve family in performance of ADLs  ¯ Assess for home care needs following discharge   ¯ Request OT consult to assist with ADL evaluation and planning for discharge  ¯ Provide patient education as appropriate  Outcome: Not Progressing  Goal: Maintain or return mobility status to optimal level  Description  INTERVENTIONS:  - Assess patient's baseline mobility status (ambulation, transfers, stairs, etc )    - Identify cognitive and physical deficits and behaviors that affect mobility  - Identify mobility aids required to assist with transfers and/or ambulation (gait belt, sit-to-stand, lift, walker, cane, etc )  - [place] fall precautions as indicated by assessment  - Record patient progress and toleration of activity level on Mobility SBAR; progress patient to next Phase/Stage  - Instruct patient to call for assistance with activity based on assessment  - Request Rehabilitation consult to assist with strengthening/weightbearing, etc   Outcome: Not Progressing     Problem: Knowledge Deficit  Goal: Patient/family/caregiver demonstrates understanding of disease process, treatment plan, medications, and discharge instructions  Description  Complete learning assessment and assess knowledge base    Interventions:  - Provide teaching at level of understanding  - Provide teaching via preferred learning methods  Outcome: Not Progressing

## 2019-08-04 NOTE — ASSESSMENT & PLAN NOTE
2 * bronchopna  o2 saturation >90% on 1L NC however w/abd muscle and neck muscle use      No significant improvement w/duoneb  S/p HFNC due to neck muscle use/abd muscle will trial wean   Continue duonebs, continue to monitor respiratory status

## 2019-08-04 NOTE — ASSESSMENT & PLAN NOTE
cxr concerning for vascular congestion may be from SubQ fluid administered in prehospital setting vs pna  nt pro bnp 5800 and albumin at 1 8 w/o prior hx of CHF  Start iv lasix 40mg once now and start iv lasix 20mg bid in am, daily weights I/os  Check tte

## 2019-08-04 NOTE — ASSESSMENT & PLAN NOTE
Pt was encephalopathic responsive to sternal rub  Likely 2* #1 now improved  Oriented to hospital setting and year/president    pt is a strict level 3 w/polst for no TPN or intubation  Given underlying dementia continue to reorient avoid over sedation

## 2019-08-04 NOTE — ASSESSMENT & PLAN NOTE
poa by tachypnea leukocytosis  Outpatient temp of 100 9 at long term nursing home    Lactic acid wnl  ua dirty but pt asymptomatic, given respiratory s/sx and evidence of pna will treat for pna  This is improving although still w/leukocytosis  Continue abx as above, d/c ivf given concern for volume overload

## 2019-08-04 NOTE — ASSESSMENT & PLAN NOTE
C/b sepsis and acute respiratory distress  Likely healthcare associated pneumonia vs aspiration pna  May be viral given 2 procalcitonins wnl  cxr w/large right sided pna  Possible LLL infiltrate or effusion limited by portable cxr     rec'd cefepime/vanc/flagyl in ed  Legionella strep pneumo ag negative    procalcitonin negative and if repeat negative can likely d/c abx  F/u bcx, monitor cbc, continue cefepime/flagyl for now  Bedside swallow study pending

## 2019-08-04 NOTE — SPEECH THERAPY NOTE
Speech Language/Pathology  Speech-Language Pathology Bedside Swallow Evaluation        Patient Name: Juvenal Navas    [id number] Date: 8/4/2019     Problem List  Patient Active Problem List   Diagnosis    Rhabdomyolysis    Elevated troponin    Accelerated hypertension    Status post placement of cardiac pacemaker    Urinary retention    Pacemaker    Hypertension    Dysphagia, oropharyngeal phase    Altered mental status    Depression    Late onset Alzheimer's disease without behavioral disturbance    Acute encephalopathy    Urinary tract infection    Elevated TSH    Thyroid nodule    Cough    Hyponatremia    Other constipation    Mild cognitive impairment    Abnormal urinalysis    Sepsis (HCC)    Acute bronchopneumonia    Acute respiratory distress    Elevated brain natriuretic peptide (BNP) level       Past Medical History  Past Medical History:   Diagnosis Date    Altered mental status     Depression     Dysphagia, oropharyngeal phase     Hypertension     Multiple thyroid nodules     Pacemaker     Urinary retention        Past Surgical History  Past Surgical History:   Procedure Laterality Date    CARDIAC PACEMAKER PLACEMENT         Summary    Pt presents with mild oral and at least mild pharyngeal dysphagia  This is characterized by prolonged mastication and bolus formation, and possible premature spill, reduced hyolaryngeal elevation and delayed swallows  Inconsistent s/s of aspiration were observed with thin liquids (more often than not)  Pt tolerated other consistencies without any s/s of aspiration  Pt is appropriate to continue PO diet, but with downgrade  Pt would also benefit from VBS         Recommendations:   Diet: soft/level 3 diet and nectar thick liquids, small sips, cup or straw  Meds: whole with liquid, nectar thick  Assist with meals as needed  Aspiration precautions and compensatory swallowing strategies: upright posture, slow rate of feeding and small bites/sips  Other Recommendations/ considerations: VBS recommended for further assessment of swallow  ST to see for dysphagia tx 3-5x per week  Current Medical Status  Marzena Leija is a 80 y o  female who presents with PMH of Alzheimer's dementia, or pharyngeal dysphagia, chronic urinary retention status post Bergeron catheterization, s/p PPM hypertension, depression coming to the hospital for unresponsive status  Patient is presenting from nursing home  She is a poor historian  She presently is alert and able to follow commands oriented to hospital setting as well as year and knows who the president is  By review of nursing home documentation patient was diagnosed with a fever and bronchopneumonia 4 days prior to admission  She was started on Levaquin for this  Today she was found unresponsive by staff and only responded to painful stimuli  She was transferred to ED for evaluation  Her mentation has improved while in ED  The pt herself offers no complaints  We are asked to admit pt for pneumonia  [address] to ask what diet pt was on there  She was on a regular diet and thin liquids, however, staff reports that pt was coughing during meals, and was working with ST for dysphagia  Pt was seen by ST at [facility] in January of 2017, when she was on a pureed diet and thin liquids       Past medical history:   Please see H&P for details    Special Studies:  CXR-Bilateral regions of alveolar opacity suggesting multifocal pneumonia       Social/Education/Vocational Hx:  Pt lives in SNF/ECF    Swallow Information   Current Risks for Dysphagia & Aspiration: known history of dysphagia and Alzheimer's  Current Symptoms/Concerns: coughing with po, change in respiratory status and pneumonia  Current Diet: regular diet and thin liquids   Baseline Diet: regular diet and thin liquids    Baseline Assessment   Behavior/Cognition: alert  Speech/Language Status: able to participate in conversation and able to follow commands  Patient Positioning: upright in bed     Swallow Mechanism Exam   Facial: symmetrical  Labial: WFL  Lingual: WFL, tongue coated in white, bruise on R side? Velum: symmetrical  Mandible:  decreased ROM  Dentition: full dentures  Vocal quality:clear/adequate   Volitional Cough: strong/productive   Respiratory: room air    Consistencies Assessed and Performance   Consistencies Administered: thin liquids, nectar thick, puree, soft solids and hard solids, including water by cup and straw, NTL by cup and straw (4 oz), applesauce, bites of banana, and marcelo cracker  Oral Stage: Adequate bolus retrieval and draw from straw  Good lip seal  Mastication was prolonged, along with bolus formation  Transfer appeared adequate  No oral residue observed  Pharyngeal Stage: ? Premature spill into pharynx, mild delays, and decreased hyolaryngeal elevation  Inconsistent coughing on thin liquids, both immediate and delayed  No s/s of aspiration with NTL, pureed, soft or hard solids  Esophageal Concerns: none reported      Results Reviewed with: patient, RN and PA (tiger texted PA)   Dysphagia Goals: pt will participate in VBS and pt will tolerate dysphagia 3 diet and NTL without s/s of aspiration     Discharge recommendation: SNF    Speech Therapy Prognosis   Prognosis: good    Prognosis Considerations: medical status and therapeutic potential

## 2019-08-04 NOTE — PROGRESS NOTES
Progress Note - Bambi Willis 6/26/1929, 80 y o  female MRN: [id number]    Unit/Bed#: E4 -01 Encounter: [id number]    Primary Care Provider: Sadaf Kellogg MD   Date and time admitted to hospital: 8/3/2019  1:16 PM        * Acute bronchopneumonia  Assessment & Plan  C/b sepsis and acute respiratory distress  Likely healthcare associated pneumonia vs aspiration pna  May be viral given 2 procalcitonins wnl  cxr w/large right sided pna  Possible LLL infiltrate or effusion limited by portable cxr     rec'd cefepime/vanc/flagyl in ed  Legionella strep pneumo ag negative  procalcitonin negative and if repeat negative can likely d/c abx  F/u bcx, monitor cbc, continue cefepime/flagyl for now  Bedside swallow study pending      Acute respiratory distress  Assessment & Plan  2 * bronchopna  o2 saturation >90% on 1L NC however w/abd muscle and neck muscle use  No significant improvement w/duoneb  S/p HFNC due to neck muscle use/abd muscle will trial wean   Continue duonebs, continue to monitor respiratory status    Acute encephalopathy  Assessment & Plan  Pt was encephalopathic responsive to sternal rub  Likely 2* #1 now improved  Oriented to hospital setting and year/president  pt is a strict level 3 w/polst for no TPN or intubation  Given underlying dementia continue to reorient avoid over sedation    Elevated brain natriuretic peptide (BNP) level  Assessment & Plan  cxr concerning for vascular congestion may be from SubQ fluid administered in prehospital setting vs pna  nt pro bnp 5800 and albumin at 1 8 w/o prior hx of CHF  Start iv lasix 40mg once now and start iv lasix 20mg bid in am, daily weights I/os  Check tte    Sepsis ([facility] [address] )  Assessment & Plan  poa by tachypnea leukocytosis  Outpatient temp of 100 9 at long term nursing home    Lactic acid wnl  ua dirty but pt asymptomatic, given respiratory s/sx and evidence of pna will treat for pna  This is improving although still w/leukocytosis  Continue abx as above, d/c ivf given concern for volume overload    Abnormal urinalysis  Assessment & Plan  In chronic gray catheterization 2* neurogenic bladder  Likely 2* asymptomatic bacteruria  abx as above f/u ucx    Late onset Alzheimer's disease without behavioral disturbance  Assessment & Plan  At baseline relatively oriented to setting/year  Pt is level 3 dni/dnr w/polst for abx and ivf hydration but no artificial nutrition or intubation/cpr  Depression  Assessment & Plan  Continue zoloft    Dysphagia, oropharyngeal phase  Assessment & Plan  Hx of but on regular diet thin liquids  Consult SLP bedside swallow study    Hypertension  Assessment & Plan  Continue lopressor    Urinary retention  Assessment & Plan  S/p chronic gray catheter exchanged on 8/3/19 known to [address] [place] [address]    Status post placement of cardiac pacemaker  Assessment & Plan  noted      VTE Pharmacologic Prophylaxis:   Pharmacologic: Enoxaparin (Lovenox)   Mechanical VTE Prophylaxis in Place: Yes    Patient Centered Rounds: I have performed bedside rounds with nursing staff today  Discussions with Specialists or Other Care Team Provider: [facility]    Education and Discussions with Family / Patient: called nephew for update    Time Spent for Care: 30 minutes  More than 50% of total time spent on counseling and coordination of care as described above  Current Length of Stay: 1 day(s)    Current Patient Status: Inpatient   Certification Statement: The patient will continue to require additional inpatient hospital stay due to chf    Discharge Plan:  Back to long term nursing facility    Code Status: Level 3 - DNAR and DNI      Subjective:   Pt seen/examined  She is alert and oriented to self/year/setting and knows why she is here  Pt notes she is still coughing  Denies sob/cp  Nursing noted that pt had spilled coffee on her abdomen earlier and notified nephew and requested examination    No blistering or c/o pain per pt    Objective:     Vitals:   Temp (24hrs), Av 6 °F (36 4 °C), Min:96 5 °F (35 8 °C), Max:99 3 °F (37 4 °C)    Temp:  [96 5 °F (35 8 °C)-99 3 °F (37 4 °C)] 97 9 °F (36 6 °C)  HR:  [60-80] 76  Resp:  [21-24] 22  BP: (131-178)/(60-77) 170/76  SpO2:  [91 %-97 %] 96 %  Body mass index is 26 33 kg/m²  Input and Output Summary (last 24 hours): Intake/Output Summary (Last 24 hours) at 2019 1143  Last data filed at 2019 0700  Gross per 24 hour   Intake 2351 66 ml   Output 880 ml   Net 1471 66 ml       Physical Exam:     Physical Exam   Constitutional: She appears well-developed  Appears stated age, somnolent  No neck muscle use or abd muscle use w/breathing   HENT:   Head: Normocephalic and atraumatic  Right Ear: External ear normal    Left Ear: External ear normal    Eyes: Conjunctivae are normal    Neck: Normal range of motion  Cardiovascular: Normal rate, regular rhythm and normal heart sounds  Exam reveals no gallop and no friction rub  No murmur heard  Pulmonary/Chest: No stridor  No respiratory distress  She has no wheezes  She has rales (generalized fine rales)  Abdominal: Soft  She exhibits no distension and no mass  There is no tenderness  There is no guarding  Musculoskeletal: She exhibits edema (b/l UE edema)  Neurological: She is alert  Skin: Skin is warm  She is diaphoretic  No abdominal erythema in area of prior coffee spill  No blistering no pain   Psychiatric: She has a normal mood and affect  Vitals reviewed      (  Be Sure to Include Physical Exam: Delete this entire line when you have entered your exam)    Additional Data:     Labs:    Results from last 7 days   Lab Units 19  0459 19  1352   WBC Thousand/uL 12 12* 11 60*   HEMOGLOBIN g/dL 10 3* 10 6*   HEMATOCRIT % 32 9* 33 3*   PLATELETS Thousands/uL 322 350   BANDS PCT %  --  9*   NEUTROS PCT % 80*  --    LYMPHS PCT % 10*  --    LYMPHO PCT %  --  8*   MONOS PCT % 6  --    MONO PCT %  --  3*   EOS PCT % 1 2     Results from last 7 days   Lab Units 08/03/19  1352   SODIUM mmol/L 140   POTASSIUM mmol/L 3 6   CHLORIDE mmol/L 108   CO2 mmol/L 24   BUN mg/dL 16   CREATININE mg/dL 0 77   ANION GAP mmol/L 8   CALCIUM mg/dL 8 7   ALBUMIN g/dL 1 8*   TOTAL BILIRUBIN mg/dL 0 46   ALK PHOS U/L 260*   ALT U/L 17   AST U/L 27   GLUCOSE RANDOM mg/dL 120     Results from last 7 days   Lab Units 08/03/19  1352   INR  1 32*             Results from last 7 days   Lab Units 08/04/19  0459 08/03/19  1352   LACTIC ACID mmol/L  --  0 7   PROCALCITONIN ng/ml 0 14 0 18           * I Have Reviewed All Lab Data Listed Above  * Additional Pertinent Lab Tests Reviewed:  [address]udd [address] Admission Reviewed    Imaging:    Imaging Reports Reviewed Today Include:   Imaging Personally Reviewed by Myself Includes:      Recent Cultures (last 7 days):     Results from last 7 days   Lab Units 08/03/19 2005   LEGIONELLA URINARY ANTIGEN  Negative       Last 24 Hours Medication List:     Current Facility-Administered Medications:  acetaminophen 650 mg Oral Q6H PRN Diana Amos PA-C    cefepime 1,000 mg Intravenous Q12H Diana Amos PA-C Last Rate: Stopped (08/04/19 0354)   cyanocobalamin 500 mcg Oral Daily Diana Amos PA-C    enoxaparin 40 mg Subcutaneous Daily Diana Amos PA-C    ferrous sulfate 325 mg Oral HS Diana Amos PA-C    folic acid 1 mg Oral Daily Diana Amos PA-C    furosemide 40 mg Intravenous Once Elliot Harry PA-C    ipratropium-albuterol 3 mL Nebulization Q6H Diana Amos PA-C    metoprolol tartrate 12 5 mg Oral BID Diana Amos PA-C    metroNIDAZOLE 500 mg Intravenous Q8H Diana Amos PA-C Last Rate: Stopped (08/04/19 0640)   ondansetron 4 mg Intravenous Q6H PRN Diana Amos PA-C    sertraline 25 mg Oral Daily Diana Amos PA-C    vancomycin 15 mg/kg Intravenous Once Elliot Harry PA-C         Today, Patient Was Seen By: Elliot Harry, SARAH    ** Please Note: Dictation voice to text software may have been used in the creation of this document   **

## 2019-08-04 NOTE — NURSING NOTE
Patient minimally responsive except grimace/pullback to painful stimuli  VS are WNL  SLIM contacted, in light of code status and POLST we will continue to monitor for decline

## 2019-08-04 NOTE — ASSESSMENT & PLAN NOTE
At baseline relatively oriented to setting/year  Pt is level 3 dni/dnr w/polst for abx and ivf hydration but no artificial nutrition or intubation/cpr

## 2019-08-05 ENCOUNTER — APPOINTMENT (INPATIENT)
Dept: RADIOLOGY | Facility: HOSPITAL | Age: 84
DRG: 871 | End: 2019-08-05
Payer: MEDICARE

## 2019-08-05 ENCOUNTER — APPOINTMENT (INPATIENT)
Dept: NON INVASIVE DIAGNOSTICS | Facility: HOSPITAL | Age: 84
DRG: 871 | End: 2019-08-05
Payer: MEDICARE

## 2019-08-05 PROBLEM — J96.00 ACUTE RESPIRATORY FAILURE (HCC): Status: ACTIVE | Noted: 2019-08-03

## 2019-08-05 LAB
ANION GAP SERPL CALCULATED.3IONS-SCNC: 6 MMOL/L (ref 4–13)
BASOPHILS # BLD AUTO: 0.02 THOUSANDS/ΜL (ref 0–0.1)
BASOPHILS NFR BLD AUTO: 0 % (ref 0–1)
BUN SERPL-MCNC: 12 MG/DL (ref 5–25)
CALCIUM SERPL-MCNC: 8.4 MG/DL (ref 8.3–10.1)
CHLORIDE SERPL-SCNC: 105 MMOL/L (ref 100–108)
CO2 SERPL-SCNC: 28 MMOL/L (ref 21–32)
CREAT SERPL-MCNC: 0.66 MG/DL (ref 0.6–1.3)
EOSINOPHIL # BLD AUTO: 0.15 THOUSAND/ΜL (ref 0–0.61)
EOSINOPHIL NFR BLD AUTO: 2 % (ref 0–6)
ERYTHROCYTE [DISTWIDTH] IN BLOOD BY AUTOMATED COUNT: 13.6 % (ref 11.6–15.1)
GFR SERPL CREATININE-BSD FRML MDRD: 78 ML/MIN/1.73SQ M
GLUCOSE SERPL-MCNC: 114 MG/DL (ref 65–140)
HCT VFR BLD AUTO: 29.6 % (ref 34.8–46.1)
HGB BLD-MCNC: 9.5 G/DL (ref 11.5–15.4)
IMM GRANULOCYTES # BLD AUTO: 0.15 THOUSAND/UL (ref 0–0.2)
IMM GRANULOCYTES NFR BLD AUTO: 2 % (ref 0–2)
LYMPHOCYTES # BLD AUTO: 0.98 THOUSANDS/ΜL (ref 0.6–4.47)
LYMPHOCYTES NFR BLD AUTO: 11 % (ref 14–44)
MCH RBC QN AUTO: 32.5 PG (ref 26.8–34.3)
MCHC RBC AUTO-ENTMCNC: 32.1 G/DL (ref 31.4–37.4)
MCV RBC AUTO: 101 FL (ref 82–98)
MONOCYTES # BLD AUTO: 0.86 THOUSAND/ΜL (ref 0.17–1.22)
MONOCYTES NFR BLD AUTO: 9 % (ref 4–12)
NEUTROPHILS # BLD AUTO: 7 THOUSANDS/ΜL (ref 1.85–7.62)
NEUTS SEG NFR BLD AUTO: 76 % (ref 43–75)
NRBC BLD AUTO-RTO: 0 /100 WBCS
PLATELET # BLD AUTO: 322 THOUSANDS/UL (ref 149–390)
PMV BLD AUTO: 8.5 FL (ref 8.9–12.7)
POTASSIUM SERPL-SCNC: 2.8 MMOL/L (ref 3.5–5.3)
PROCALCITONIN SERPL-MCNC: 0.15 NG/ML
RBC # BLD AUTO: 2.92 MILLION/UL (ref 3.81–5.12)
SODIUM SERPL-SCNC: 139 MMOL/L (ref 136–145)
WBC # BLD AUTO: 9.16 THOUSAND/UL (ref 4.31–10.16)

## 2019-08-05 PROCEDURE — 71046 X-RAY EXAM CHEST 2 VIEWS: CPT

## 2019-08-05 PROCEDURE — 84145 PROCALCITONIN (PCT): CPT | Performed by: PHYSICIAN ASSISTANT

## 2019-08-05 PROCEDURE — 99232 SBSQ HOSP IP/OBS MODERATE 35: CPT | Performed by: INTERNAL MEDICINE

## 2019-08-05 PROCEDURE — 80048 BASIC METABOLIC PNL TOTAL CA: CPT | Performed by: PHYSICIAN ASSISTANT

## 2019-08-05 PROCEDURE — 92611 MOTION FLUOROSCOPY/SWALLOW: CPT

## 2019-08-05 PROCEDURE — 94760 N-INVAS EAR/PLS OXIMETRY 1: CPT

## 2019-08-05 PROCEDURE — 85025 COMPLETE CBC W/AUTO DIFF WBC: CPT | Performed by: PHYSICIAN ASSISTANT

## 2019-08-05 PROCEDURE — 74230 X-RAY XM SWLNG FUNCJ C+: CPT

## 2019-08-05 PROCEDURE — 92526 ORAL FUNCTION THERAPY: CPT

## 2019-08-05 PROCEDURE — 94640 AIRWAY INHALATION TREATMENT: CPT

## 2019-08-05 RX ORDER — POTASSIUM CHLORIDE 20 MEQ/1
40 TABLET, EXTENDED RELEASE ORAL 2 TIMES DAILY
Status: COMPLETED | OUTPATIENT
Start: 2019-08-05 | End: 2019-08-05

## 2019-08-05 RX ORDER — LEVALBUTEROL 1.25 MG/.5ML
1.25 SOLUTION, CONCENTRATE RESPIRATORY (INHALATION)
Status: DISCONTINUED | OUTPATIENT
Start: 2019-08-05 | End: 2019-08-07 | Stop reason: HOSPADM

## 2019-08-05 RX ORDER — GUAIFENESIN 600 MG
600 TABLET, EXTENDED RELEASE 12 HR ORAL EVERY 12 HOURS SCHEDULED
Status: DISCONTINUED | OUTPATIENT
Start: 2019-08-05 | End: 2019-08-07 | Stop reason: HOSPADM

## 2019-08-05 RX ORDER — IPRATROPIUM BROMIDE AND ALBUTEROL SULFATE 2.5; .5 MG/3ML; MG/3ML
3 SOLUTION RESPIRATORY (INHALATION)
Status: DISCONTINUED | OUTPATIENT
Start: 2019-08-05 | End: 2019-08-05

## 2019-08-05 RX ADMIN — FOLIC ACID 1 MG: 1 TABLET ORAL at 09:10

## 2019-08-05 RX ADMIN — ENOXAPARIN SODIUM 40 MG: 40 INJECTION SUBCUTANEOUS at 09:09

## 2019-08-05 RX ADMIN — LEVALBUTEROL 1.25 MG: 1.25 SOLUTION, CONCENTRATE RESPIRATORY (INHALATION) at 13:40

## 2019-08-05 RX ADMIN — METRONIDAZOLE 500 MG: 500 INJECTION, SOLUTION INTRAVENOUS at 06:09

## 2019-08-05 RX ADMIN — IPRATROPIUM BROMIDE 0.5 MG: 0.5 SOLUTION RESPIRATORY (INHALATION) at 18:43

## 2019-08-05 RX ADMIN — POTASSIUM CHLORIDE 40 MEQ: 1500 TABLET, EXTENDED RELEASE ORAL at 16:26

## 2019-08-05 RX ADMIN — FUROSEMIDE 20 MG: 10 INJECTION, SOLUTION INTRAMUSCULAR; INTRAVENOUS at 16:26

## 2019-08-05 RX ADMIN — CYANOCOBALAMIN TAB 500 MCG 500 MCG: 500 TAB at 09:10

## 2019-08-05 RX ADMIN — IPRATROPIUM BROMIDE AND ALBUTEROL SULFATE 3 ML: 2.5; .5 SOLUTION RESPIRATORY (INHALATION) at 07:45

## 2019-08-05 RX ADMIN — SERTRALINE HYDROCHLORIDE 25 MG: 25 TABLET ORAL at 09:10

## 2019-08-05 RX ADMIN — IPRATROPIUM BROMIDE 0.5 MG: 0.5 SOLUTION RESPIRATORY (INHALATION) at 13:40

## 2019-08-05 RX ADMIN — LEVALBUTEROL 1.25 MG: 1.25 SOLUTION, CONCENTRATE RESPIRATORY (INHALATION) at 18:43

## 2019-08-05 RX ADMIN — FERROUS SULFATE TAB 325 MG (65 MG ELEMENTAL FE) 325 MG: 325 (65 FE) TAB at 21:49

## 2019-08-05 RX ADMIN — GUAIFENESIN 600 MG: 600 TABLET, EXTENDED RELEASE ORAL at 16:26

## 2019-08-05 RX ADMIN — GUAIFENESIN 600 MG: 600 TABLET, EXTENDED RELEASE ORAL at 21:49

## 2019-08-05 RX ADMIN — METOPROLOL TARTRATE 12.5 MG: 25 TABLET ORAL at 09:10

## 2019-08-05 RX ADMIN — FUROSEMIDE 20 MG: 10 INJECTION, SOLUTION INTRAMUSCULAR; INTRAVENOUS at 09:09

## 2019-08-05 RX ADMIN — METOPROLOL TARTRATE 12.5 MG: 25 TABLET ORAL at 17:20

## 2019-08-05 RX ADMIN — CEFEPIME HYDROCHLORIDE 1000 MG: 1 INJECTION, POWDER, FOR SOLUTION INTRAMUSCULAR; INTRAVENOUS at 03:00

## 2019-08-05 RX ADMIN — POTASSIUM CHLORIDE 40 MEQ: 1500 TABLET, EXTENDED RELEASE ORAL at 17:20

## 2019-08-05 NOTE — PLAN OF CARE
Problem: Potential for Falls  Goal: Patient will remain free of falls  Description  INTERVENTIONS:  - Assess patient frequently for physical needs  -  Identify cognitive and physical deficits and behaviors that affect risk of falls    -  [place] fall precautions as indicated by assessment   - Educate patient/family on patient safety including physical limitations  - Instruct patient to call for assistance with activity based on assessment  - Modify environment to reduce risk of injury  - Consider OT/PT consult to assist with strengthening/mobility  Outcome: Progressing     Problem: Prexisting or High Potential for Compromised Skin Integrity  Goal: Skin integrity is maintained or improved  Description  INTERVENTIONS:  - Identify patients at risk for skin breakdown  - Assess and monitor skin integrity  - Assess and monitor nutrition and hydration status  - Monitor labs (i e  albumin)  - Assess for incontinence   - Turn and reposition patient  - Assist with mobility/ambulation  - Relieve pressure over bony prominences  - Avoid friction and shearing  - Provide appropriate hygiene as needed including keeping skin clean and dry  - Evaluate need for skin moisturizer/barrier cream  - Collaborate with interdisciplinary team (i e  Nutrition, Rehabilitation, etc )   - Patient/family teaching  Outcome: Progressing     Problem: INFECTION - ADULT  Goal: Absence or prevention of progression during hospitalization  Description  INTERVENTIONS:  - Assess and monitor for signs and symptoms of infection  - Monitor lab/diagnostic results  - Monitor all insertion sites, i e  indwelling lines, tubes, and drains  - Monitor endotracheal (as able) and nasal secretions for changes in amount and color  - [place] appropriate cooling/warming therapies per order  - Administer medications as ordered  - Instruct and encourage patient and family to use good hand hygiene technique  - Identify and instruct in appropriate isolation precautions for identified infection/condition  Outcome: Progressing  Goal: Absence of fever/infection during neutropenic period  Description  INTERVENTIONS:  - Monitor WBC  - Implement neutropenic guidelines  Outcome: Progressing     Problem: SAFETY ADULT  Goal: Maintain or return to baseline ADL function  Description  INTERVENTIONS:  -  Assess patient's ability to carry out ADLs; assess patient's baseline for ADL function and identify physical deficits which impact ability to perform ADLs (bathing, care of mouth/teeth, toileting, grooming, dressing, etc )  - Assess/evaluate cause of self-care deficits   - Assess range of motion  - Assess patient's mobility; develop plan if impaired  - Assess patient's need for assistive devices and provide as appropriate  - Encourage maximum independence but intervene and supervise when necessary  ¯ Involve family in performance of ADLs  ¯ Assess for home care needs following discharge   ¯ Request OT consult to assist with ADL evaluation and planning for discharge  ¯ Provide patient education as appropriate  Outcome: Progressing  Goal: Maintain or return mobility status to optimal level  Description  INTERVENTIONS:  - Assess patient's baseline mobility status (ambulation, transfers, stairs, etc )    - Identify cognitive and physical deficits and behaviors that affect mobility  - Identify mobility aids required to assist with transfers and/or ambulation (gait belt, sit-to-stand, lift, walker, cane, etc )  - [place] fall precautions as indicated by assessment  - Record patient progress and toleration of activity level on Mobility SBAR; progress patient to next Phase/Stage  - Instruct patient to call for assistance with activity based on assessment  - Request Rehabilitation consult to assist with strengthening/weightbearing, etc   Outcome: Progressing     Problem: DISCHARGE PLANNING  Goal: Discharge to home or other facility with appropriate resources  Description  INTERVENTIONS:  - Identify barriers to discharge w/patient and caregiver  - Arrange for needed discharge resources and transportation as appropriate  - Identify discharge learning needs (meds, wound care, etc )  - Arrange for interpretive services to assist at discharge as needed  - Refer to Case Management Department for coordinating discharge planning if the patient needs post-hospital services based on physician/advanced practitioner order or complex needs related to functional status, cognitive ability, or social support system  Outcome: Progressing     Problem: Knowledge Deficit  Goal: Patient/family/caregiver demonstrates understanding of disease process, treatment plan, medications, and discharge instructions  Description  Complete learning assessment and assess knowledge base    Interventions:  - Provide teaching at level of understanding  - Provide teaching via preferred learning methods  Outcome: Progressing

## 2019-08-05 NOTE — ASSESSMENT & PLAN NOTE
POA by tachypnea leukocytosis  Outpatient temp of 100 9 at long term nursing home    Lactic acid wnl  UA dirty but pt asymptomatic an urine culture growing mixed contaminants  Leukocytosis and tachypnea resolved

## 2019-08-05 NOTE — ASSESSMENT & PLAN NOTE
Hx of dysphagia but on regular diet thin liquids on admission  Speech therapy consulted for bedside swallow eval

## 2019-08-05 NOTE — ASSESSMENT & PLAN NOTE
Chest x-ray concerning for vascular congestion  Appears to be improved after trial of IV Lasix  BNP noted to be 5800 and albumin at 1 8 w/o prior hx of CHF  Continue IV Lasix 20 mg twice daily  Echocardiogram pending

## 2019-08-05 NOTE — UTILIZATION REVIEW
Initial Clinical Review    Admission: Date/Time/Statement: 8/3/19 @ 1557     Orders Placed This Encounter   Procedures    Inpatient Admission     Standing Status:   Standing     Number of Occurrences:   1     Order Specific Question:   Admitting Physician     Answer:   Salvador Ellis [[id number]]     Order Specific Question:   Level of Care     Answer:   Med Surg [[id number]]     Order Specific Question:   Estimated length of stay     Answer:   More than 2 Midnights     Order Specific Question:   Certification     Answer:   I certify that inpatient services are medically necessary for this patient for a duration of greater than two midnights  See H&P and MD Progress Notes for additional information about the patient's course of treatment  ED Arrival Information     Expected Arrival Acuity Means of Arrival Escorted By Service Admission Type    - 8/3/2019 13:15 Urgent Ambulance [facility] Urgent    Arrival Complaint    unresponsive        Chief Complaint   Patient presents with    Altered Mental Status     Patient [place] [facility]  Patient has not been responding to treatment and today patient is nonresponsive  Sent with copy of POLST  Assessment/Plan: 81 yo female presents yo ED from SNF due to decreased mental status  She was diagnosed with fever and bronchopneumonia 4 days ago & was started on Levaquin  Today she was found unresponsive by staff and only responded to painful stimuli  Admitted to IP with     Acute bronchopneumonia  Assessment & Plan  C/b sepsis and acute respiratory distress  Likely healthcare associated pneumonia vs aspiration pna  cxr w/large right sided pna  Possible LLL infiltrate or effusion limited by portable cxr  rec'd cefepime/vanc/flagyl in ed  Continue cefepime/flagyl, check legionella/strep pneumo ag sputum cx  F/u procalcitonin, bcx     Acute respiratory distress  Assessment & Plan  2 * bronchopna    o2 saturation >90% on 1L NC however w/abd muscle and neck muscle use  No significant improvement w/duoneb  Start HFNC now  If no improvement can trial bipap  D/w poa if does not improve w/bipap would need to d/w withdrawal of care as pt is a strict level 3 w/polst  Continue duonebs, continue to monitor respiratory status     Acute encephalopathy  Assessment & Plan  Pt was encephalopathic responsive to sternal rub  Likely 2* #1 now improved  Oriented to hospital setting and year presently  Given underlying dementia continue to reorient avoid over sedation     Sepsis ([facility] [address] )  Assessment & Plan  poa by tachypnea leukocytosis  Outpatient temp of 100 9 at long term nursing home  Lactic acid wnl  ua dirty but pt asymptomatic, given respiratory s/sx and evidence of pna will treat for pna  Continue abx as above, continue ivf hydration     Abnormal urinalysis  Assessment & Plan  In chronic gray catheterization 2* neurogenic bladder  Likely 2* asymptomatic bacteruria  abx as above f/u ucx     Late onset Alzheimer's disease without behavioral disturbance  Assessment & Plan  At baseline relatively oriented to setting/year  Pt is level 3 dni/dnr w/polst for abx and ivf hydration but no artificial nutrition or intubation/cpr      Depression  Assessment & Plan  Continue zoloft     Dysphagia, oropharyngeal phase  Assessment & Plan  Hx of but on regular diet thin liquids  Consult SLP bedside swallow study     Hypertension  Assessment & Plan  Continue lopressor     Urinary retention  Assessment & Plan  S/p chronic gray catheter exchanged on 8/3/19 known to floresita     Status post placement of cardiac pacemaker  Assessment & Plan  Noted    Anticipated Length of Stay:  Patient will be admitted on an Inpatient basis with an anticipated length of stay of  Greater than 2 midnights     Justification for Hospital Stay: [facility]    ED Triage Vitals [08/03/19 1325]   Temperature Pulse Respirations Blood Pressure SpO2   99 3 °F (37 4 °C) 70 22 (!) 178/77 93 %      Temp Source Heart Rate Source Patient Position - Orthostatic VS BP Location FiO2 (%)   Temporal Monitor Lying Right arm --      Pain Score       No Pain        Wt Readings from Last 1 Encounters:   08/05/19 71 1 kg (156 lb 12 oz)     Additional Vital Signs:   /04/19 1100  97 9 °F (36 6 °C)  76  22  170/76  96 %  Other (comment)   08/04/19 0700  97 5 °F (36 4 °C)  80  22  161/68  91 %  Other (comment)   08/03/19 2325  96 5 °F (35 8 °C)Abnormal   60  24Abnormal   133/64  94 %  --   08/03/19 2209  96 6 °F (35 9 °C)Abnormal   63  --  131/60  94 %  Other (comment)    08/03/19 2011  --  --  --  --  93 %  --   08/03/19 1816  97 6 °F (36 4 °C)  80  24Abnormal   168/74  96 %  Other (comment)    08/03/19 1802  --  --  --  --  94 %  Other (comment)  Hi Flow O2   08/03/19 1715  --  80  23Abnormal   150/69  97 %  Nasal cannula on 4L   08/03/19 1515  --  68  23Abnormal   155/70  92 %  Nasal cannula on 4 L   08/03/19 1445  --  68  22  152/67  94 %       Pertinent Labs/Diagnostic Test Results:   Results from last 7 days   Lab Units 08/05/19  0518 08/04/19  0459 08/03/19  1352   WBC Thousand/uL 9 16 12 12* 11 60*   HEMOGLOBIN g/dL 9 5* 10 3* 10 6*   HEMATOCRIT % 29 6* 32 9* 33 3*   PLATELETS Thousands/uL 322 322 350   NEUTROS ABS Thousands/µL 7 00 9 63*  --    BANDS PCT %  --   --  9*     Results from last 7 days   Lab Units 08/05/19  0518 08/03/19  1352   SODIUM mmol/L 139 140   POTASSIUM mmol/L 2 8* 3 6   CHLORIDE mmol/L 105 108   CO2 mmol/L 28 24   ANION GAP mmol/L 6 8   BUN mg/dL 12 16   CREATININE mg/dL 0 66 0 77   EGFR ml/min/1 73sq m 78 68   CALCIUM mg/dL 8 4 8 7     Results from last 7 days   Lab Units 08/03/19  1352   AST U/L 27   ALT U/L 17   ALK PHOS U/L 260*   TOTAL PROTEIN g/dL 6 1*   ALBUMIN g/dL 1 8*   TOTAL BILIRUBIN mg/dL 0 46     Results from last 7 days   Lab Units 08/04/19  1340   POC GLUCOSE mg/dl 116     Results from last 7 days   Lab Units 08/05/19  0518 08/03/19  1352   GLUCOSE RANDOM mg/dL 114 120     Results from last 7 days   Lab Units 08/03/19  2316   PH MARIBEL  7 328   PCO2 MARIBEL mm Hg 46 0   PO2 MARIBEL mm Hg 83 9*   HCO3 MARIBEL mmol/L 23 6*   BASE EXC MARIBEL mmol/L -2 4   O2 CONTENT MARIBEL ml/dL 13 6   O2 HGB, VENOUS % 95 2*     Results from last 7 days   Lab Units 08/03/19  1352   PROTIME seconds 16 6*   INR  1 32*   PTT seconds 36     Results from last 7 days   Lab Units 08/05/19  0518 08/04/19  0459 08/03/19  1352   PROCALCITONIN ng/ml 0 15 0 14 0 18     Results from last 7 days   Lab Units 08/03/19  1352   LACTIC ACID mmol/L 0 7     Results from last 7 days   Lab Units 08/03/19  1352   NT-PRO BNP pg/mL 5,837*     Results from last 7 days   Lab Units 08/03/19  1507   CLARITY UA  Slightly Cloudy   COLOR UA  Yellow   SPEC GRAV UA  1 025   PH UA  6 0   GLUCOSE UA mg/dl Negative   KETONES UA mg/dl Negative   BLOOD UA  Large*   PROTEIN UA mg/dl 30 (1+)*   NITRITE UA  Negative   BILIRUBIN UA  Negative   UROBILINOGEN UA E U /dl 2 0*   LEUKOCYTES UA  Small*   WBC UA /hpf 10-20*   RBC UA /hpf Innumerable*   BACTERIA UA /hpf Occasional   EPITHELIAL CELLS WET PREP /hpf Occasional     Results from last 7 days   Lab Units 08/03/19  2005   STREP PNEUMONIAE ANTIGEN, URINE  Negative   LEGIONELLA URINARY ANTIGEN  Negative     Results from last 7 days   Lab Units 08/03/19  1507 08/03/19  1356 08/03/19  1352   BLOOD CULTURE   --  No Growth at 24 hrs  No Growth at 24 hrs  URINE CULTURE  <10,000 cfu/ml   --   --      Results from last 7 days   Lab Units 08/03/19  1352   TOTAL COUNTED  100     8/3 BC's pending  8/3 CXR:  Bilateral regions of alveolar opacity suggesting multifocal pneumonia        ED Treatment:   Medication Administration from 08/03/2019 1315 to 08/03/2019 1800       Date/Time Order Dose Route Action     08/03/2019 1533 sodium chloride 0 9 % bolus 500 mL 0 mL Intravenous Stopped     08/03/2019 1357 sodium chloride 0 9 % bolus 500 mL 500 mL Intravenous New Bag     08/03/2019 1624 cefepime (MAXIPIME) 2 g/50 mL dextrose IVPB 0 mg Intravenous Stopped     08/03/2019 1536 cefepime (MAXIPIME) 2 g/50 mL dextrose IVPB 2,000 mg Intravenous New Bag     08/03/2019 1738 metroNIDAZOLE (FLAGYL) IVPB (premix) 500 mg 500 mg Intravenous New Bag     08/03/2019 1738 azithromycin (ZITHROMAX) 500 mg in sodium chloride 0 9% 250mL IVPB 500 mg 0 mg Intravenous Stopped     08/03/2019 1624 azithromycin (ZITHROMAX) 500 mg in sodium chloride 0 9% 250mL IVPB 500 mg 500 mg Intravenous New Bag     08/03/2019 1702 ipratropium-albuterol (DUO-NEB) 0 5-2 5 mg/3 mL inhalation solution 3 mL 3 mL Nebulization Given        Past Medical History:   Diagnosis Date    Altered mental status     Depression     Dysphagia, oropharyngeal phase     Hypertension     Multiple thyroid nodules     Pacemaker     Urinary retention      Present on Admission:   Abnormal urinalysis   Dysphagia, oropharyngeal phase   Late onset Alzheimer's disease without behavioral disturbance   Status post placement of cardiac pacemaker   Acute encephalopathy   Hypertension   Depression   Urinary retention   [facility]      Admitting Diagnosis: Somnolence [R40 0]  Altered mental status [R41 82]  Pneumonia [J18 9]  SOB (shortness of breath) [R06 02]  Sepsis ([facility] [address] ) [A41 9]  Age/Sex: 80 y o  female     Admission Orders:  Current Facility-Administered Medications:  acetaminophen 650 mg Oral Q6H PRN    cyanocobalamin 500 mcg Oral Daily    enoxaparin 40 mg Subcutaneous Daily    ferrous sulfate 325 mg Oral HS    folic acid 1 mg Oral Daily    furosemide 20 mg Intravenous BID (diuretic)    guaiFENesin 600 mg Oral Q12H MAHENDRA    ipratropium 0 5 mg Nebulization q6h    levalbuterol 1 25 mg Nebulization q6h    metoprolol tartrate 12 5 mg Oral BID    ondansetron 4 mg Intravenous Q6H PRN    potassium chloride 40 mEq Oral BID    sertraline 25 mg Oral Daily      Cefepime IV q 12h  Flagyl 500 mg IV q8h   IVF @ 48 / hr  ECHO  [facility]'s      Network Utilization Review Department  Phone: [phone]; Fax [phone]  [email or web address]  ATTENTION: Please call with any questions or concerns to [phone]  and carefully listen to the prompts so that you are directed to the right person  Send all requests for admission clinical reviews, approved or denied determinations and any other requests to fax [phone]   All voicemails are confidential

## 2019-08-05 NOTE — PROGRESS NOTES
Progress Note - Brisa Smith 6/26/1929, 80 y o  female MRN: [id number]    Unit/Bed#: E4 -01 Encounter: [id number]    Primary Care Provider: Bing Stokes MD   Date and time admitted to hospital: 8/3/2019  1:16 PM      * Acute bronchopneumonia  Assessment & Plan  Initially presented with sepsis and acute respiratory distress  Possible HCAP vs aspiration pneumonia vs viral bronchopneumonia vs vascular congestion  Likely viral in nature as procalcitonin negative x2  Repeat chest x-ray appears to be improving status post IV Lasix  Received cefepime/vanc/flagyl in ED  Stop antibiotics at this point given negative procalcitonin  Legionella strep pneumonia antigen negative  Bedside swallow study pending  Echocardiogram pending    Elevated brain natriuretic peptide (BNP) level  Assessment & Plan  Chest x-ray concerning for vascular congestion  Appears to be improved after trial of IV Lasix  BNP noted to be 5800 and albumin at 1 8 w/o prior hx of CHF  Continue IV Lasix 20 mg twice daily  Echocardiogram pending    Acute respiratory failure [facility])  Assessment & Plan  Likely secondary to above  Oxygen saturation >90% on 2L NC  Respiratory status appears to be improving    Sepsis [facility])  Assessment & Plan  POA by tachypnea leukocytosis  Outpatient temp of 100 9 at long term nursing home  Lactic acid wnl  UA dirty but pt asymptomatic an urine culture growing mixed contaminants  Leukocytosis and tachypnea resolved    Abnormal urinalysis  Assessment & Plan  In chronic gray catheterization secondary neurogenic bladder  Likely secondary to asymptomatic bacteruria  Urine culture with mixed contaminants    Acute encephalopathy  Assessment & Plan  Presented with acute metabolic encephalopathy responsive to sternal rub on admission  Likely secondary to acute bronchopneumonia vs volume overload    Given underlying dementia continue to reorient and avoid over sedation  Mental status appears to be improving    Late onset Alzheimer's disease without behavioral disturbance  Assessment & Plan  At baseline relatively oriented to setting/year  Pt is level 3 dni/dnr w/polst for abx and ivf hydration but no artificial nutrition or intubation/cpr  Depression  Assessment & Plan  Continue zoloft, and Remeron    Dysphagia, oropharyngeal phase  Assessment & Plan  Hx of dysphagia but on regular diet thin liquids on admission  Speech therapy consulted for bedside swallow eval    Hypertension  Assessment & Plan  Continue metoprolol tartrate 12 5 mg twice daily    Urinary retention  Assessment & Plan  S/p chronic gray catheter exchanged on 8/3/19   Known to Dr Little Gaffney of Urology    Status post placement of cardiac pacemaker  Assessment & Plan  In place        VTE Pharmacologic Prophylaxis:   Pharmacologic: Enoxaparin (Lovenox)  Mechanical VTE Prophylaxis in Place: Yes    Discharge Plan: With need for continued inpatient stay for further cardiac workup    Discussions with Specialists or Other Care Team Provider: Dr Antony Payment    Education and Discussions with Family / Patient: patient, nephew    Time Spent for Care: 20 minutes  More than 50% of total time spent on counseling and coordination of care as described above  Current Length of Stay: 2 day(s)  Current Patient Status: Inpatient   Code Status: Level 3 - DNAR and DNI    Subjective:   Resting in bed  Reports feeling better  Still on 2 L oxygen nasal cannula  No home oxygen  She normally resides at [facility] crest   Still with productive cough and does admit to periods of yellow colored mucus  Otherwise denies any shortness of breath, chest pain, chest pressure  Objective:     Vitals:   Temp (24hrs), Av 2 °F (36 8 °C), Min:98 °F (36 7 °C), Max:98 4 °F (36 9 °C)    Temp:  [98 °F (36 7 °C)-98 4 °F (36 9 °C)] 98 4 °F (36 9 °C)  HR:  [67-88] 88  Resp:  [18-22] 18  BP: (132-151)/(60-68) 150/68  SpO2:  [90 %-96 %] 92 %  Body mass index is 25 3 kg/m²       Input and Output Summary (last 24 hours): Intake/Output Summary (Last 24 hours) at 8/5/2019 1246  Last data filed at 8/5/2019 0700  Gross per 24 hour   Intake 1180 ml   Output 2300 ml   Net -1120 ml       Physical Exam:     Physical Exam   Constitutional: She is oriented to person, place, and time  Frail appearing   HENT:   Head: Normocephalic and atraumatic  Cardiovascular: Normal rate, regular rhythm and normal heart sounds  Pulmonary/Chest: No stridor  No respiratory distress  She has no wheezes  She has rales  She exhibits no tenderness  Bronchospasm upon attempting deep breath  Scattered rhonchi   Abdominal: Soft  Bowel sounds are normal  She exhibits no distension and no mass  There is no tenderness  There is no rebound and no guarding  No hernia  Neurological: She is alert and oriented to person, place, and time  Skin: Skin is warm and dry  Psychiatric: She has a normal mood and affect  Her behavior is normal    Nursing note and vitals reviewed  Additional Data:     Labs:    Results from last 7 days   Lab Units 08/05/19  0518   WBC Thousand/uL 9 16   HEMOGLOBIN g/dL 9 5*   HEMATOCRIT % 29 6*   PLATELETS Thousands/uL 322   NEUTROS PCT % 76*   LYMPHS PCT % 11*   MONOS PCT % 9   EOS PCT % 2     Results from last 7 days   Lab Units 08/05/19  0518 08/03/19  1352   POTASSIUM mmol/L 2 8* 3 6   CHLORIDE mmol/L 105 108   CO2 mmol/L 28 24   BUN mg/dL 12 16   CREATININE mg/dL 0 66 0 77   CALCIUM mg/dL 8 4 8 7   ALK PHOS U/L  --  260*   ALT U/L  --  17   AST U/L  --  27     Results from last 7 days   Lab Units 08/03/19  1352   INR  1 32*       * I Have Reviewed All Lab Data Listed Above  * Additional Pertinent Lab Tests Reviewed:  [address] Admission Reviewed    Imaging:    Imaging Reports Reviewed Today Include:   Imaging Personally Reviewed by Myself Includes:      Recent Cultures (last 7 days):     Results from last 7 days   Lab Units 08/03/19 2005 08/03/19  1507 08/03/19  1356 08/03/19  1352   BLOOD CULTURE   --   --  No Growth at 24 hrs  No Growth at 24 hrs  URINE CULTURE   --  <10,000 cfu/ml   --   --    LEGIONELLA URINARY ANTIGEN  Negative  --   --   --        Last 24 Hours Medication List:     Current Facility-Administered Medications:  acetaminophen 650 mg Oral Q6H PRN Diana Amos PA-C   cyanocobalamin 500 mcg Oral Daily Diana Amos PA-C   enoxaparin 40 mg Subcutaneous Daily Diana Amos PA-C   ferrous sulfate 325 mg Oral HS Diana Amos PA-C   folic acid 1 mg Oral Daily Diana Amos PA-C   furosemide 20 mg Intravenous BID (diuretic) Diana Amos PA-C   guaiFENesin 600 mg Oral Q12H [address] Gale Mckenzie PA-C   ipratropium 0 5 mg Nebulization TID Saritha Tom PA-C   levalbuterol 1 25 mg Nebulization TID Saritha Tom PA-C   metoprolol tartrate 12 5 mg Oral BID Diana Amos PA-C   ondansetron 4 mg Intravenous Q6H PRN Diana Amos PA-C   potassium chloride 40 mEq Oral BID Gale Mckenzie PA-C   sertraline 25 mg Oral Daily Diana Davidson PA-C        Today, Patient Was Seen By: Saritha Tom PA-C    ** Please Note: This note has been constructed using a voice recognition system   **

## 2019-08-05 NOTE — ASSESSMENT & PLAN NOTE
Initially presented with sepsis and acute respiratory distress  Possible HCAP vs aspiration pneumonia vs viral bronchopneumonia vs vascular congestion  Likely viral in nature as procalcitonin negative x2  Repeat chest x-ray appears to be improving status post IV Lasix  Received cefepime/vanc/flagyl in ED  Stop antibiotics at this point given negative procalcitonin  Legionella strep pneumonia antigen negative      Bedside swallow study pending  Echocardiogram pending

## 2019-08-05 NOTE — SPEECH THERAPY NOTE
Speech Language/Pathology    Speech/Language Pathology Progress Note    Patient Name: Brisa Smith  ZCLNB'I Date: 8/5/2019     Problem List  Patient Active Problem List   Diagnosis    Rhabdomyolysis    Elevated troponin    Accelerated hypertension    Status post placement of cardiac pacemaker    Urinary retention    Pacemaker    Hypertension    Dysphagia, oropharyngeal phase    Altered mental status    Depression    Late onset Alzheimer's disease without behavioral disturbance    Acute encephalopathy    Urinary tract infection    Elevated TSH    Thyroid nodule    Cough    Hyponatremia    Other constipation    Mild cognitive impairment    Abnormal urinalysis    Sepsis (HCC)    Acute bronchopneumonia    Acute respiratory distress    Elevated brain natriuretic peptide (BNP) level        Past Medical History  Past Medical History:   Diagnosis Date    Altered mental status     Depression     Dysphagia, oropharyngeal phase     Hypertension     Multiple thyroid nodules     Pacemaker     Urinary retention         Past Surgical History  Past Surgical History:   Procedure Laterality Date    CARDIAC PACEMAKER PLACEMENT           Subjective:  "oh I ate plenty"  Objective:  Seen in am at breakfast  Stated if she ate anymore she would be sick  She did not eat any of the applesauce and it did not appear that she ate any of the oatmeal  ~ 1 ounce of the coffee was gone  " I can only drink"  States she likes OJ  I brought her a nectar thick oj  She took consecutive straw sips, voice remained clear, delayed cough  ? Related to drink vs not  Assessment:  Delayed cough w/ nectar  ? Related vs not  VBS already ordered  Plan/Recommendations:  spoke w/ radiology/pca/nurse   VBS at ~ 11 am

## 2019-08-05 NOTE — PROCEDURES
Video Swallow Study      Patient Name: Alicia Cross  IEWANJoselynP Date: 8/5/2019        Past Medical History  Past Medical History:   Diagnosis Date    Altered mental status     Depression     Dysphagia, oropharyngeal phase     Hypertension     Multiple thyroid nodules     Pacemaker     Urinary retention         Past Surgical History  Past Surgical History:   Procedure Laterality Date    CARDIAC PACEMAKER PLACEMENT       Video Barium Swallow Study    Summary:  Pt presented w/ at least mild oral stage, wfl pharyngeal stage this study  Mastication and transfer of soft solid were moderately to mod/severe  prolonged  Pt was unable to transfer the barium pill w/ thi nliquid but transferred it w/ puree wnl  No aspiration observed this study, though pt coughed throughout the study  Per gross screening there was esophageal stasis w/ puree and soft solid that cleared w/ liquid  Images are on PACS for review  Recommendations:  Diet:dysphagia 2 mechanical soft   Liquids: thin  Meds:wgole in applesauce  Strategies: sips in between bites  Upright position  F/u ST tx: yes  Therapy Prognosis:favorable  CloseSupervision  Aspiration Precautions  Reflux Precautions  Results reviewed with: pt, nursing  Aspiration precautions posted  Seat upright 10-15 minutes prior to initiating meal to clear congestion prior to eating  Patient's goal:  Hoped everything looked ok  (results explained)    Goals:  Pt will tolerate least restrictive diet w/out s/s aspiration or oral/pharyngeal difficulties  Pt will take small sips of liquid in between purees/solids w/ min cues to clear esophageal stasis  Pt is a 89yof referred for VBS following bedside eval done yesterday       Previous VBS:  None known  Current Diet:  Dysphagia 3 and nectar  Premorbid diet:  Regular and thin  Dentition:  Upper and lower dentures  O2 requirement:  none  Vocal Quality/Speech:  Wnl, [place]  Cognitive status:  Alert and cooperative  Consistencies administered: Barium laden applesauce, soft solid, nectar thick, thin liquids, 13mm barium pill  Liquids were administered by cup and straw  Pt was seated laterally at 90 degrees  Oral stage:  Mild/mod  Lip closure:wnl  Mastication:at least moderately prolonged  Bolus formation: wfl  Bolus control:wfl/mildly reduced  Transfer:wnl  Residue:trace/none    Pharyngeal stage: WNL  Swallow promptness:prompt  Spill to valleculae:intermittent  Spill to pyriforms:none  Epiglottic inversion:complete  Laryngeal excursion:wnl  Pharyngeal constriction:wnl  Vallecular retention:mild w/ thin  Pyriform retention:mild w/ nectar and thin  PPW coating:none  CP prominence:none  Transient penetration:on thin when trying to take the pill  Epiglottic undercoat:none  Penetration:none  Aspiration:none  Strategies:liquids cleared esophageal retention    Screening of Esophageal stage:  Dysmotility  Retention  Cleared w/ thin liquid    Speech note: 12;18-12:30  Pt was seen at lunch following the vbs  She did not eat any of the sliced carrots or the ground/chopped meat and only had the mashed potatoes  Trialed thin liquids  Took multiple sips of thin liquid w/out overt s/s aspiration  No coughing observed this meal  Pt then wanted to get back into bed  Recommend seat upright 10-15 min prior to initiating meal to allow pt to clear congestion  Slow rate, sips in between bites  May need to adjust diet if pt continues to favor purees

## 2019-08-05 NOTE — ASSESSMENT & PLAN NOTE
In chronic gray catheterization secondary neurogenic bladder  Likely secondary to asymptomatic bacteruria  Urine culture with mixed contaminants

## 2019-08-05 NOTE — ASSESSMENT & PLAN NOTE
Presented with acute metabolic encephalopathy responsive to sternal rub on admission  Likely secondary to acute bronchopneumonia vs volume overload    Given underlying dementia continue to reorient and avoid over sedation  Mental status appears to be improving

## 2019-08-05 NOTE — ASSESSMENT & PLAN NOTE
Likely secondary to above      Oxygen saturation >90% on 2L NC  Respiratory status appears to be improving

## 2019-08-05 NOTE — PLAN OF CARE
Problem: Potential for Falls  Goal: Patient will remain free of falls  Description  INTERVENTIONS:  - Assess patient frequently for physical needs  -  Identify cognitive and physical deficits and behaviors that affect risk of falls    -  [place] fall precautions as indicated by assessment   - Educate patient/family on patient safety including physical limitations  - Instruct patient to call for assistance with activity based on assessment  - Modify environment to reduce risk of injury  - Consider OT/PT consult to assist with strengthening/mobility  Outcome: Progressing     Problem: Prexisting or High Potential for Compromised Skin Integrity  Goal: Skin integrity is maintained or improved  Description  INTERVENTIONS:  - Identify patients at risk for skin breakdown  - Assess and monitor skin integrity  - Assess and monitor nutrition and hydration status  - Monitor labs (i e  albumin)  - Assess for incontinence   - Turn and reposition patient  - Assist with mobility/ambulation  - Relieve pressure over bony prominences  - Avoid friction and shearing  - Provide appropriate hygiene as needed including keeping skin clean and dry  - Evaluate need for skin moisturizer/barrier cream  - Collaborate with interdisciplinary team (i e  Nutrition, Rehabilitation, etc )   - Patient/family teaching  Outcome: Progressing     Problem: INFECTION - ADULT  Goal: Absence or prevention of progression during hospitalization  Description  INTERVENTIONS:  - Assess and monitor for signs and symptoms of infection  - Monitor lab/diagnostic results  - Monitor all insertion sites, i e  indwelling lines, tubes, and drains  - Monitor endotracheal (as able) and nasal secretions for changes in amount and color  - [place] appropriate cooling/warming therapies per order  - Administer medications as ordered  - Instruct and encourage patient and family to use good hand hygiene technique  - Identify and instruct in appropriate isolation precautions for identified infection/condition  Outcome: Progressing  Goal: Absence of fever/infection during neutropenic period  Description  INTERVENTIONS:  - Monitor WBC  - Implement neutropenic guidelines  Outcome: Progressing     Problem: SAFETY ADULT  Goal: Maintain or return to baseline ADL function  Description  INTERVENTIONS:  -  Assess patient's ability to carry out ADLs; assess patient's baseline for ADL function and identify physical deficits which impact ability to perform ADLs (bathing, care of mouth/teeth, toileting, grooming, dressing, etc )  - Assess/evaluate cause of self-care deficits   - Assess range of motion  - Assess patient's mobility; develop plan if impaired  - Assess patient's need for assistive devices and provide as appropriate  - Encourage maximum independence but intervene and supervise when necessary  ¯ Involve family in performance of ADLs  ¯ Assess for home care needs following discharge   ¯ Request OT consult to assist with ADL evaluation and planning for discharge  ¯ Provide patient education as appropriate  Outcome: Progressing  Goal: Maintain or return mobility status to optimal level  Description  INTERVENTIONS:  - Assess patient's baseline mobility status (ambulation, transfers, stairs, etc )    - Identify cognitive and physical deficits and behaviors that affect mobility  - Identify mobility aids required to assist with transfers and/or ambulation (gait belt, sit-to-stand, lift, walker, cane, etc )  - [place] fall precautions as indicated by assessment  - Record patient progress and toleration of activity level on Mobility SBAR; progress patient to next Phase/Stage  - Instruct patient to call for assistance with activity based on assessment  - Request Rehabilitation consult to assist with strengthening/weightbearing, etc   Outcome: Progressing     Problem: DISCHARGE PLANNING  Goal: Discharge to home or other facility with appropriate resources  Description  INTERVENTIONS:  - Identify barriers to discharge w/patient and caregiver  - Arrange for needed discharge resources and transportation as appropriate  - Identify discharge learning needs (meds, wound care, etc )  - Arrange for interpretive services to assist at discharge as needed  - Refer to Case Management Department for coordinating discharge planning if the patient needs post-hospital services based on physician/advanced practitioner order or complex needs related to functional status, cognitive ability, or social support system  Outcome: Progressing     Problem: Knowledge Deficit  Goal: Patient/family/caregiver demonstrates understanding of disease process, treatment plan, medications, and discharge instructions  Description  Complete learning assessment and assess knowledge base    Interventions:  - Provide teaching at level of understanding  - Provide teaching via preferred learning methods  Outcome: Progressing

## 2019-08-06 ENCOUNTER — APPOINTMENT (INPATIENT)
Dept: NON INVASIVE DIAGNOSTICS | Facility: HOSPITAL | Age: 84
DRG: 871 | End: 2019-08-06
Payer: MEDICARE

## 2019-08-06 LAB
ANION GAP SERPL CALCULATED.3IONS-SCNC: 4 MMOL/L (ref 4–13)
BUN SERPL-MCNC: 9 MG/DL (ref 5–25)
CALCIUM SERPL-MCNC: 8.5 MG/DL (ref 8.3–10.1)
CHLORIDE SERPL-SCNC: 101 MMOL/L (ref 100–108)
CO2 SERPL-SCNC: 33 MMOL/L (ref 21–32)
CREAT SERPL-MCNC: 0.62 MG/DL (ref 0.6–1.3)
ERYTHROCYTE [DISTWIDTH] IN BLOOD BY AUTOMATED COUNT: 13.5 % (ref 11.6–15.1)
GFR SERPL CREATININE-BSD FRML MDRD: 80 ML/MIN/1.73SQ M
GLUCOSE SERPL-MCNC: 112 MG/DL (ref 65–140)
HCT VFR BLD AUTO: 32.2 % (ref 34.8–46.1)
HGB BLD-MCNC: 10.4 G/DL (ref 11.5–15.4)
MCH RBC QN AUTO: 32.7 PG (ref 26.8–34.3)
MCHC RBC AUTO-ENTMCNC: 32.3 G/DL (ref 31.4–37.4)
MCV RBC AUTO: 101 FL (ref 82–98)
PLATELET # BLD AUTO: 338 THOUSANDS/UL (ref 149–390)
PMV BLD AUTO: 8.7 FL (ref 8.9–12.7)
POTASSIUM SERPL-SCNC: 3.8 MMOL/L (ref 3.5–5.3)
RBC # BLD AUTO: 3.18 MILLION/UL (ref 3.81–5.12)
SODIUM SERPL-SCNC: 138 MMOL/L (ref 136–145)
WBC # BLD AUTO: 7.86 THOUSAND/UL (ref 4.31–10.16)

## 2019-08-06 PROCEDURE — 93306 TTE W/DOPPLER COMPLETE: CPT | Performed by: INTERNAL MEDICINE

## 2019-08-06 PROCEDURE — 94640 AIRWAY INHALATION TREATMENT: CPT

## 2019-08-06 PROCEDURE — 80048 BASIC METABOLIC PNL TOTAL CA: CPT | Performed by: PHYSICIAN ASSISTANT

## 2019-08-06 PROCEDURE — 94760 N-INVAS EAR/PLS OXIMETRY 1: CPT

## 2019-08-06 PROCEDURE — 92526 ORAL FUNCTION THERAPY: CPT

## 2019-08-06 PROCEDURE — 85027 COMPLETE CBC AUTOMATED: CPT | Performed by: PHYSICIAN ASSISTANT

## 2019-08-06 PROCEDURE — 99232 SBSQ HOSP IP/OBS MODERATE 35: CPT | Performed by: PHYSICIAN ASSISTANT

## 2019-08-06 PROCEDURE — 93306 TTE W/DOPPLER COMPLETE: CPT

## 2019-08-06 RX ADMIN — LEVALBUTEROL 1.25 MG: 1.25 SOLUTION, CONCENTRATE RESPIRATORY (INHALATION) at 07:17

## 2019-08-06 RX ADMIN — IPRATROPIUM BROMIDE 0.5 MG: 0.5 SOLUTION RESPIRATORY (INHALATION) at 19:24

## 2019-08-06 RX ADMIN — CYANOCOBALAMIN TAB 500 MCG 500 MCG: 500 TAB at 09:11

## 2019-08-06 RX ADMIN — FUROSEMIDE 20 MG: 10 INJECTION, SOLUTION INTRAMUSCULAR; INTRAVENOUS at 17:16

## 2019-08-06 RX ADMIN — METOPROLOL TARTRATE 12.5 MG: 25 TABLET ORAL at 09:12

## 2019-08-06 RX ADMIN — LEVALBUTEROL 1.25 MG: 1.25 SOLUTION, CONCENTRATE RESPIRATORY (INHALATION) at 19:24

## 2019-08-06 RX ADMIN — FUROSEMIDE 20 MG: 10 INJECTION, SOLUTION INTRAMUSCULAR; INTRAVENOUS at 09:12

## 2019-08-06 RX ADMIN — IPRATROPIUM BROMIDE 0.5 MG: 0.5 SOLUTION RESPIRATORY (INHALATION) at 07:17

## 2019-08-06 RX ADMIN — METOPROLOL TARTRATE 12.5 MG: 25 TABLET ORAL at 17:16

## 2019-08-06 RX ADMIN — GUAIFENESIN 600 MG: 600 TABLET, EXTENDED RELEASE ORAL at 09:12

## 2019-08-06 RX ADMIN — SERTRALINE HYDROCHLORIDE 25 MG: 25 TABLET ORAL at 09:12

## 2019-08-06 RX ADMIN — ENOXAPARIN SODIUM 40 MG: 40 INJECTION SUBCUTANEOUS at 09:13

## 2019-08-06 RX ADMIN — LEVALBUTEROL 1.25 MG: 1.25 SOLUTION, CONCENTRATE RESPIRATORY (INHALATION) at 13:30

## 2019-08-06 RX ADMIN — IPRATROPIUM BROMIDE 0.5 MG: 0.5 SOLUTION RESPIRATORY (INHALATION) at 13:30

## 2019-08-06 RX ADMIN — FOLIC ACID 1 MG: 1 TABLET ORAL at 09:12

## 2019-08-06 NOTE — SOCIAL WORK
Cm met with patient she lives a [facility]  Patient is very confused  Wheelchair bound  CM called patients POA nephew Olliebreanna Case  IMM explained  Patient's POA would like her to return to [facility] at d/c  CM sent Ecin referral to [facility]

## 2019-08-06 NOTE — PROGRESS NOTES
Progress Note - Melecio Muller 6/26/1929, 80 y o  female MRN: [id number]    Unit/Bed#: E4 -01 Encounter: [id number]    Primary Care Provider: Babita Brambila MD   Date and time admitted to hospital: 8/3/2019  1:16 PM        * Acute bronchopneumonia  Assessment & Plan  Initially presented with sepsis and acute respiratory distress  Possible HCAP- viral bronchopneumonia vs vascular congestion  Likely viral in nature as procalcitonin negative x2   Repeat chest x-ray appears to be improving status post IV Lasix  Received cefepime/vanc/flagyl in ED  Stop antibiotics at this point given negative procalcitonin  Legionella strep pneumonia antigen negative  Bedside speech eval with no evidence of aspiration  Echocardiogram pending    Elevated brain natriuretic peptide (BNP) level  Assessment & Plan  Chest x-ray concerning for vascular congestion  Appears to be improved after trial of IV Lasix  BNP noted to be 5800 and albumin at 1 8 w/o prior hx of CHF  Continue IV Lasix 20 mg twice daily  Echocardiogram pending    Acute respiratory failure [facility])  Assessment & Plan  Likely secondary to above  Titrate oxygen saturation >90% on 2L NC  Respiratory status appears to be improving  Will need home O2 eval    Sepsis ([address] )  Assessment & Plan  POA by tachypnea leukocytosis  Outpatient temp of 100 9 at long term nursing home  Lactic acid wnl  UA dirty but pt asymptomatic an urine culture growing mixed contaminants  Leukocytosis and tachypnea resolved    Abnormal urinalysis  Assessment & Plan  In chronic gray catheterization secondary neurogenic bladder  Likely secondary to asymptomatic bacteruria  Urine culture with mixed contaminants    Acute encephalopathy  Assessment & Plan  Presented with acute metabolic encephalopathy responsive to sternal rub on admission  Likely secondary to acute bronchopneumonia vs volume overload    Given underlying dementia continue to reorient and avoid over sedation  Mental status appears to be improving    Late onset Alzheimer's disease without behavioral disturbance  Assessment & Plan  At baseline relatively oriented to setting/year  Pt is level 3 dni/dnr- polst- abx and ivf hydration but no artificial nutrition or intubation/cpr  Depression  Assessment & Plan  Continue zoloft, and Remeron    Dysphagia, oropharyngeal phase  Assessment & Plan  On dysphagia 3 with nectar thick liquids  Seen and evaluated by speech therapy   No evidence of aspiration although slow swallowing was noted  Recommends dysphagia 2 with thin liquids    Hypertension  Assessment & Plan  Continue metoprolol tartrate 12 5 mg twice daily    Urinary retention  Assessment & Plan  S/p chronic gray catheter exchanged on 8/3/19   Known to Dr Kathy Leal of Urology    Status post placement of cardiac pacemaker  Assessment & Plan  In place          VTE Pharmacologic Prophylaxis:   Pharmacologic: Enoxaparin (Lovenox)  Mechanical VTE Prophylaxis in Place: Yes    Discharge Plan:  Hopeful DC within the next 24-48 hours, echocardiogram pending    Discussions with Specialists or Other Care Team Provider:  Dr Abril Hidalgo    Education and Discussions with Family / Patient:  Patient, called nephew-unable to reach    Time Spent for Care: 20 minutes  More than 50% of total time spent on counseling and coordination of care as described above  Current Length of Stay: 3 day(s)  Current Patient Status: Inpatient   Code Status: Level 3 - DNAR and DNI    Subjective:   Patient resting comfortably in bed  She notes her cough is improving  She worked with speech therapy and there were no signs of aspiration although she does have slow swallowing  She is starting to feel more like herself  Still currently on 2 L oxygen nasal cannula  Discharge planning  Denies any chest pain, chest pressure      Objective:     Vitals:   Temp (24hrs), Av 2 °F (36 8 °C), Min:98 1 °F (36 7 °C), Max:98 4 °F (36 9 °C)    Temp:  [98 1 °F (36 7 °C)-98 4 °F (36 9 °C)] 98 4 °F (36 9 °C)  HR:  [90-97] 97  Resp:  [18-20] 20  BP: (149-154)/(67-79) 152/67  SpO2:  [91 %-95 %] 91 %  Body mass index is 25 41 kg/m²  Input and Output Summary (last 24 hours): Intake/Output Summary (Last 24 hours) at 8/6/2019 1238  Last data filed at 8/6/2019 1208  Gross per 24 hour   Intake 300 ml   Output 2950 ml   Net -2650 ml       Physical Exam:     Physical Exam   Constitutional: No distress  Frail appearing   HENT:   Head: Normocephalic and atraumatic  Cardiovascular: Normal rate, regular rhythm and normal heart sounds  Pulmonary/Chest: She exhibits no tenderness  Moving more air compared to yesterday  Less rhonchi  Able to take a deeper breath today    Abdominal: Soft  Bowel sounds are normal  She exhibits no distension and no mass  There is no tenderness  There is no rebound and no guarding  No hernia  Neurological: She is alert  Skin: Skin is warm and dry  She is not diaphoretic  Psychiatric: She has a normal mood and affect  Her behavior is normal    Nursing note and vitals reviewed  Additional Data:     Labs:    Results from last 7 days   Lab Units 08/06/19  0459 08/05/19  0518   WBC Thousand/uL 7 86 9 16   HEMOGLOBIN g/dL 10 4* 9 5*   HEMATOCRIT % 32 2* 29 6*   PLATELETS Thousands/uL 338 322   NEUTROS PCT %  --  76*   LYMPHS PCT %  --  11*   MONOS PCT %  --  9   EOS PCT %  --  2     Results from last 7 days   Lab Units 08/06/19  0459  08/03/19  1352   POTASSIUM mmol/L 3 8   < > 3 6   CHLORIDE mmol/L 101   < > 108   CO2 mmol/L 33*   < > 24   BUN mg/dL 9   < > 16   CREATININE mg/dL 0 62   < > 0 77   CALCIUM mg/dL 8 5   < > 8 7   ALK PHOS U/L  --   --  260*   ALT U/L  --   --  17   AST U/L  --   --  27    < > = values in this interval not displayed  Results from last 7 days   Lab Units 08/03/19  1352   INR  1 32*       * I Have Reviewed All Lab Data Listed Above  * Additional Pertinent Lab Tests Reviewed:  [address] Admission Reviewed    Imaging:    Imaging Reports Reviewed Today Include:   Imaging Personally Reviewed by Myself Includes:      Recent Cultures (last 7 days):     Results from last 7 days   Lab Units 08/03/19  2005 08/03/19  1507 08/03/19  1356 08/03/19  1352   BLOOD CULTURE   --   --  No Growth at 48 hrs  No Growth at 48 hrs  URINE CULTURE   --  <10,000 cfu/ml   --   --    LEGIONELLA URINARY ANTIGEN  Negative  --   --   --        Last 24 Hours Medication List:     Current Facility-Administered Medications:  acetaminophen 650 mg Oral Q6H PRN Diana Amos PA-C   cyanocobalamin 500 mcg Oral Daily Diana Amos PA-C   enoxaparin 40 mg Subcutaneous Daily Diana Amos PA-C   ferrous sulfate 325 mg Oral HS Diana Amos PA-C   folic acid 1 mg Oral Daily Diana Amos PA-C   furosemide 20 mg Intravenous BID (diuretic) Diana Amos PA-C   guaiFENesin 600 mg Oral Q12H [address] Gale SARAH Mckenzie   ipratropium 0 5 mg Nebulization TID Gita Nayak PA-C   levalbuterol 1 25 mg Nebulization TID Gita Nayak PA-C   metoprolol tartrate 12 5 mg Oral BID Diana Amos PA-C   ondansetron 4 mg Intravenous Q6H PRN Diana Amos PA-C   sertraline 25 mg Oral Daily Diana James PA-C        Today, Patient Was Seen By: Gita Nayak PA-C    ** Please Note: This note has been constructed using a voice recognition system   **

## 2019-08-06 NOTE — PLAN OF CARE
Problem: Potential for Falls  Goal: Patient will remain free of falls  Description  INTERVENTIONS:  - Assess patient frequently for physical needs  -  Identify cognitive and physical deficits and behaviors that affect risk of falls    -  [place] fall precautions as indicated by assessment   - Educate patient/family on patient safety including physical limitations  - Instruct patient to call for assistance with activity based on assessment  - Modify environment to reduce risk of injury  - Consider OT/PT consult to assist with strengthening/mobility  8/6/2019 1055 by Nara Guerra RN  Outcome: Progressing  8/6/2019 1054 by Nara Guerra RN  Outcome: Progressing     Problem: Prexisting or High Potential for Compromised Skin Integrity  Goal: Skin integrity is maintained or improved  Description  INTERVENTIONS:  - Identify patients at risk for skin breakdown  - Assess and monitor skin integrity  - Assess and monitor nutrition and hydration status  - Monitor labs (i e  albumin)  - Assess for incontinence   - Turn and reposition patient  - Assist with mobility/ambulation  - Relieve pressure over bony prominences  - Avoid friction and shearing  - Provide appropriate hygiene as needed including keeping skin clean and dry  - Evaluate need for skin moisturizer/barrier cream  - Collaborate with interdisciplinary team (i e  Nutrition, Rehabilitation, etc )   - Patient/family teaching  8/6/2019 1055 by Nara Guerra RN  Outcome: Progressing  8/6/2019 1054 by Nara Guerra RN  Outcome: Progressing     Problem: INFECTION - ADULT  Goal: Absence or prevention of progression during hospitalization  Description  INTERVENTIONS:  - Assess and monitor for signs and symptoms of infection  - Monitor lab/diagnostic results  - Monitor all insertion sites, i e  indwelling lines, tubes, and drains  - Monitor endotracheal (as able) and nasal secretions for changes in amount and color  - [place] appropriate cooling/warming therapies per order  - Administer medications as ordered  - Instruct and encourage patient and family to use good hand hygiene technique  - Identify and instruct in appropriate isolation precautions for identified infection/condition  8/6/2019 1055 by Sandra Chowdhury RN  Outcome: Progressing  8/6/2019 1054 by Sandra Chowdhury RN  Outcome: Progressing  Goal: Absence of fever/infection during neutropenic period  Description  INTERVENTIONS:  - Monitor WBC  - Implement neutropenic guidelines  8/6/2019 1055 by Sandra Chowdhury RN  Outcome: Progressing  8/6/2019 1054 by Sandra Chowdhury RN  Outcome: Progressing     Problem: SAFETY ADULT  Goal: Maintain or return to baseline ADL function  Description  INTERVENTIONS:  -  Assess patient's ability to carry out ADLs; assess patient's baseline for ADL function and identify physical deficits which impact ability to perform ADLs (bathing, care of mouth/teeth, toileting, grooming, dressing, etc )  - Assess/evaluate cause of self-care deficits   - Assess range of motion  - Assess patient's mobility; develop plan if impaired  - Assess patient's need for assistive devices and provide as appropriate  - Encourage maximum independence but intervene and supervise when necessary  ¯ Involve family in performance of ADLs  ¯ Assess for home care needs following discharge   ¯ Request OT consult to assist with ADL evaluation and planning for discharge  ¯ Provide patient education as appropriate  8/6/2019 1055 by Sandra Chowdhury RN  Outcome: Progressing  8/6/2019 1054 by Sandra Chowdhury RN  Outcome: Progressing  Goal: Maintain or return mobility status to optimal level  Description  INTERVENTIONS:  - Assess patient's baseline mobility status (ambulation, transfers, stairs, etc )    - Identify cognitive and physical deficits and behaviors that affect mobility  - Identify mobility aids required to assist with transfers and/or ambulation (gait belt, sit-to-stand, lift, walker, cane, etc )  - [place] fall precautions as indicated by assessment  - Record patient progress and toleration of activity level on Mobility SBAR; progress patient to next Phase/Stage  - Instruct patient to call for assistance with activity based on assessment  - Request Rehabilitation consult to assist with strengthening/weightbearing, etc   8/6/2019 1055 by Ruth Hanson RN  Outcome: Progressing  8/6/2019 1054 by Ruth Hanson RN  Outcome: Progressing     Problem: DISCHARGE PLANNING  Goal: Discharge to home or other facility with appropriate resources  Description  INTERVENTIONS:  - Identify barriers to discharge w/patient and caregiver  - Arrange for needed discharge resources and transportation as appropriate  - Identify discharge learning needs (meds, wound care, etc )  - Arrange for interpretive services to assist at discharge as needed  - Refer to Case Management Department for coordinating discharge planning if the patient needs post-hospital services based on physician/advanced practitioner order or complex needs related to functional status, cognitive ability, or social support system  8/6/2019 1055 by Ruth Hanson RN  Outcome: Progressing  8/6/2019 1054 by Ruth Hanson RN  Outcome: Progressing     Problem: Knowledge Deficit  Goal: Patient/family/caregiver demonstrates understanding of disease process, treatment plan, medications, and discharge instructions  Description  Complete learning assessment and assess knowledge base    Interventions:  - Provide teaching at level of understanding  - Provide teaching via preferred learning methods  8/6/2019 1055 by Ruth Hanson RN  Outcome: Progressing  8/6/2019 1054 by Ruth Hanson RN  Outcome: Progressing     Problem: Nutrition/Hydration-ADULT  Goal: Nutrient/Hydration intake appropriate for improving, restoring or maintaining nutritional needs  Description  Monitor and assess patient's nutrition/hydration status for malnutrition (ex- brittle hair, bruises, dry skin, pale skin and conjunctiva, muscle wasting, smooth red tongue, and disorientation)  Collaborate with interdisciplinary team and initiate plan and interventions as ordered  Monitor patient's weight and dietary intake as ordered or per policy  Utilize nutrition screening tool and intervene per policy  Determine patient's food preferences and provide high-protein, high-caloric foods as appropriate       INTERVENTIONS:  - Monitor oral intake, urinary output, labs, and treatment plans  - Assess nutrition and hydration status and recommend course of action  - Evaluate amount of meals eaten  - Assist patient with eating if necessary   - Allow adequate time for meals  - Recommend/ encourage appropriate diets, oral nutritional supplements, and vitamin/mineral supplements  - Order, calculate, and assess calorie counts as needed  - Recommend, monitor, and adjust tube feedings and TPN/PPN based on assessed needs  - Assess need for intravenous fluids  - Provide specific nutrition/hydration education as appropriate  - Include patient/family/caregiver in decisions related to nutrition  8/6/2019 1055 by Anna Grajeda RN  Outcome: Progressing  8/6/2019 1054 by Anna Grajeda RN  Outcome: Progressing

## 2019-08-06 NOTE — NURSING NOTE
Original POLST sent to medical records after it was scanned into chart  Spoke with Jazzy from Medical Records who stated she had it and would mail it to [facility] today since pt is to be d/c tomorrow and we would not receive it here at [facility] before pt left  Primary RN and Clinical Coordinator aware

## 2019-08-06 NOTE — ASSESSMENT & PLAN NOTE
Initially presented with sepsis and acute respiratory distress  Possible HCAP- viral bronchopneumonia vs vascular congestion  Likely viral in nature as procalcitonin negative x2   Repeat chest x-ray appears to be improving status post IV Lasix  Received cefepime/vanc/flagyl in ED  Stop antibiotics at this point given negative procalcitonin  Legionella strep pneumonia antigen negative      Bedside speech eval with no evidence of aspiration  Echocardiogram pending

## 2019-08-06 NOTE — ASSESSMENT & PLAN NOTE
At baseline relatively oriented to setting/year  Pt is level 3 dni/dnr- polst- abx and ivf hydration but no artificial nutrition or intubation/cpr

## 2019-08-06 NOTE — PLAN OF CARE
Problem: Potential for Falls  Goal: Patient will remain free of falls  Description  INTERVENTIONS:  - Assess patient frequently for physical needs  -  Identify cognitive and physical deficits and behaviors that affect risk of falls    -  [place] fall precautions as indicated by assessment   - Educate patient/family on patient safety including physical limitations  - Instruct patient to call for assistance with activity based on assessment  - Modify environment to reduce risk of injury  - Consider OT/PT consult to assist with strengthening/mobility  Outcome: Progressing     Problem: Prexisting or High Potential for Compromised Skin Integrity  Goal: Skin integrity is maintained or improved  Description  INTERVENTIONS:  - Identify patients at risk for skin breakdown  - Assess and monitor skin integrity  - Assess and monitor nutrition and hydration status  - Monitor labs (i e  albumin)  - Assess for incontinence   - Turn and reposition patient  - Assist with mobility/ambulation  - Relieve pressure over bony prominences  - Avoid friction and shearing  - Provide appropriate hygiene as needed including keeping skin clean and dry  - Evaluate need for skin moisturizer/barrier cream  - Collaborate with interdisciplinary team (i e  Nutrition, Rehabilitation, etc )   - Patient/family teaching  Outcome: Progressing     Problem: INFECTION - ADULT  Goal: Absence or prevention of progression during hospitalization  Description  INTERVENTIONS:  - Assess and monitor for signs and symptoms of infection  - Monitor lab/diagnostic results  - Monitor all insertion sites, i e  indwelling lines, tubes, and drains  - Monitor endotracheal (as able) and nasal secretions for changes in amount and color  - [place] appropriate cooling/warming therapies per order  - Administer medications as ordered  - Instruct and encourage patient and family to use good hand hygiene technique  - Identify and instruct in appropriate isolation precautions for identified infection/condition  Outcome: Progressing  Goal: Absence of fever/infection during neutropenic period  Description  INTERVENTIONS:  - Monitor WBC  - Implement neutropenic guidelines  Outcome: Progressing     Problem: SAFETY ADULT  Goal: Maintain or return to baseline ADL function  Description  INTERVENTIONS:  -  Assess patient's ability to carry out ADLs; assess patient's baseline for ADL function and identify physical deficits which impact ability to perform ADLs (bathing, care of mouth/teeth, toileting, grooming, dressing, etc )  - Assess/evaluate cause of self-care deficits   - Assess range of motion  - Assess patient's mobility; develop plan if impaired  - Assess patient's need for assistive devices and provide as appropriate  - Encourage maximum independence but intervene and supervise when necessary  ¯ Involve family in performance of ADLs  ¯ Assess for home care needs following discharge   ¯ Request OT consult to assist with ADL evaluation and planning for discharge  ¯ Provide patient education as appropriate  Outcome: Progressing  Goal: Maintain or return mobility status to optimal level  Description  INTERVENTIONS:  - Assess patient's baseline mobility status (ambulation, transfers, stairs, etc )    - Identify cognitive and physical deficits and behaviors that affect mobility  - Identify mobility aids required to assist with transfers and/or ambulation (gait belt, sit-to-stand, lift, walker, cane, etc )  - [place] fall precautions as indicated by assessment  - Record patient progress and toleration of activity level on Mobility SBAR; progress patient to next Phase/Stage  - Instruct patient to call for assistance with activity based on assessment  - Request Rehabilitation consult to assist with strengthening/weightbearing, etc   Outcome: Progressing     Problem: DISCHARGE PLANNING  Goal: Discharge to home or other facility with appropriate resources  Description  INTERVENTIONS:  - Identify barriers to discharge w/patient and caregiver  - Arrange for needed discharge resources and transportation as appropriate  - Identify discharge learning needs (meds, wound care, etc )  - Arrange for interpretive services to assist at discharge as needed  - Refer to Case Management Department for coordinating discharge planning if the patient needs post-hospital services based on physician/advanced practitioner order or complex needs related to functional status, cognitive ability, or social support system  Outcome: Progressing     Problem: Knowledge Deficit  Goal: Patient/family/caregiver demonstrates understanding of disease process, treatment plan, medications, and discharge instructions  Description  Complete learning assessment and assess knowledge base  Interventions:  - Provide teaching at level of understanding  - Provide teaching via preferred learning methods  Outcome: Progressing     Problem: Nutrition/Hydration-ADULT  Goal: Nutrient/Hydration intake appropriate for improving, restoring or maintaining nutritional needs  Description  Monitor and assess patient's nutrition/hydration status for malnutrition (ex- brittle hair, bruises, dry skin, pale skin and conjunctiva, muscle wasting, smooth red tongue, and disorientation)  Collaborate with interdisciplinary team and initiate plan and interventions as ordered  Monitor patient's weight and dietary intake as ordered or per policy  Utilize nutrition screening tool and intervene per policy  Determine patient's food preferences and provide high-protein, high-caloric foods as appropriate       INTERVENTIONS:  - Monitor oral intake, urinary output, labs, and treatment plans  - Assess nutrition and hydration status and recommend course of action  - Evaluate amount of meals eaten  - Assist patient with eating if necessary   - Allow adequate time for meals  - Recommend/ encourage appropriate diets, oral nutritional supplements, and vitamin/mineral supplements  - Order, calculate, and assess calorie counts as needed  - Recommend, monitor, and adjust tube feedings and TPN/PPN based on assessed needs  - Assess need for intravenous fluids  - Provide specific nutrition/hydration education as appropriate  - Include patient/family/caregiver in decisions related to nutrition  Outcome: Progressing

## 2019-08-06 NOTE — SPEECH THERAPY NOTE
Speech Language/Pathology    Speech/Language Pathology Progress Note    Patient Name: Esa Dub  SVZTL'G Date: 8/6/2019     Problem List  Patient Active Problem List   Diagnosis    Rhabdomyolysis    Elevated troponin    Accelerated hypertension    Status post placement of cardiac pacemaker    Urinary retention    Pacemaker    Hypertension    Dysphagia, oropharyngeal phase    Altered mental status    Depression    Late onset Alzheimer's disease without behavioral disturbance    Acute encephalopathy    Urinary tract infection    Elevated TSH    Thyroid nodule    Cough    Hyponatremia    Other constipation    Mild cognitive impairment    Abnormal urinalysis    Sepsis (HCC)    Acute bronchopneumonia    Acute respiratory failure (HCC)    Elevated brain natriuretic peptide (BNP) level        Past Medical History  Past Medical History:   Diagnosis Date    Altered mental status     Depression     Dysphagia, oropharyngeal phase     Hypertension     Multiple thyroid nodules     Pacemaker     Urinary retention         Past Surgical History  Past Surgical History:   Procedure Laterality Date    CARDIAC PACEMAKER PLACEMENT           Subjective:  Pt alert and pleasant, states she is weak  Wanted assist w/ feeding from nurse  Objective:  S/p vbs done yesterday  Diet not changed yet  Pt tends to eat mostly the puree items on tray, mashed potatoes, pudding  Ate only a small amt of the chopped chicken  VERY prolonged mastication, but she states she wants to have some things to chew  I asked if she wanted water "THIN water?"  When I gave it to her she stated "oh that's good!"  Sip size controlled  Needs to be fully upright w/ distractions limited  Tolerates wnl  Assessment:  Tolerating thin liquids when seated fully upright  Sip size controlled  She tends to eat mostly purees but wants to have some items to chew  Plan/Recommendations:  Dysphagia 2 mechanical soft and thin

## 2019-08-06 NOTE — ASSESSMENT & PLAN NOTE
On dysphagia 3 with nectar thick liquids  Seen and evaluated by speech therapy   No evidence of aspiration although slow swallowing was noted  Recommends dysphagia 2 with thin liquids

## 2019-08-06 NOTE — ASSESSMENT & PLAN NOTE
Likely secondary to above      Titrate oxygen saturation >90% on 2L NC  Respiratory status appears to be improving  Will need home O2 eval

## 2019-08-07 ENCOUNTER — TELEPHONE (OUTPATIENT)
Dept: OTHER | Facility: OTHER | Age: 84
End: 2019-08-07

## 2019-08-07 VITALS
HEART RATE: 106 BPM | TEMPERATURE: 98.6 F | WEIGHT: 149.69 LBS | DIASTOLIC BLOOD PRESSURE: 61 MMHG | BODY MASS INDEX: 24.16 KG/M2 | OXYGEN SATURATION: 90 % | SYSTOLIC BLOOD PRESSURE: 138 MMHG | RESPIRATION RATE: 22 BRPM

## 2019-08-07 PROBLEM — I50.31 ACUTE DIASTOLIC CONGESTIVE HEART FAILURE (HCC): Status: ACTIVE | Noted: 2019-08-04

## 2019-08-07 PROCEDURE — 94640 AIRWAY INHALATION TREATMENT: CPT

## 2019-08-07 PROCEDURE — 99239 HOSP IP/OBS DSCHRG MGMT >30: CPT | Performed by: PHYSICIAN ASSISTANT

## 2019-08-07 PROCEDURE — 94760 N-INVAS EAR/PLS OXIMETRY 1: CPT

## 2019-08-07 RX ORDER — FUROSEMIDE 20 MG/1
20 TABLET ORAL EVERY OTHER DAY
Qty: 30 TABLET | Refills: 0 | Status: SHIPPED | OUTPATIENT
Start: 2019-08-07 | End: 2021-06-05 | Stop reason: HOSPADM

## 2019-08-07 RX ADMIN — IPRATROPIUM BROMIDE 0.5 MG: 0.5 SOLUTION RESPIRATORY (INHALATION) at 07:40

## 2019-08-07 RX ADMIN — ENOXAPARIN SODIUM 40 MG: 40 INJECTION SUBCUTANEOUS at 09:11

## 2019-08-07 RX ADMIN — LEVALBUTEROL 1.25 MG: 1.25 SOLUTION, CONCENTRATE RESPIRATORY (INHALATION) at 13:44

## 2019-08-07 RX ADMIN — FOLIC ACID 1 MG: 1 TABLET ORAL at 09:11

## 2019-08-07 RX ADMIN — IPRATROPIUM BROMIDE 0.5 MG: 0.5 SOLUTION RESPIRATORY (INHALATION) at 13:44

## 2019-08-07 RX ADMIN — LEVALBUTEROL 1.25 MG: 1.25 SOLUTION, CONCENTRATE RESPIRATORY (INHALATION) at 07:40

## 2019-08-07 RX ADMIN — METOPROLOL TARTRATE 12.5 MG: 25 TABLET ORAL at 09:11

## 2019-08-07 RX ADMIN — FUROSEMIDE 20 MG: 10 INJECTION, SOLUTION INTRAMUSCULAR; INTRAVENOUS at 09:10

## 2019-08-07 RX ADMIN — GUAIFENESIN 600 MG: 600 TABLET, EXTENDED RELEASE ORAL at 09:11

## 2019-08-07 RX ADMIN — SERTRALINE HYDROCHLORIDE 25 MG: 25 TABLET ORAL at 09:11

## 2019-08-07 RX ADMIN — CYANOCOBALAMIN TAB 500 MCG 500 MCG: 500 TAB at 09:11

## 2019-08-07 NOTE — TRANSPORTATION MEDICAL NECESSITY
Section I - General Information    Name of Patient: Waleska Shaver                 : 1929    Medicare #: [id number]  Transport Date: 19 (PCS is valid for round trips on this date and for all repetitive trips in the 60-day range as noted below )  Origin: [address]                                                         Destination: [facility]  Is the pt's stay covered under Medicare Part A (PPS/DRG)   []     Closest appropriate facility? If no, why is transport to more distant facility required? No  If no, explain: n/a  If hospice pt, is this transport related to pt's terminal illness? NA       Section II - Medical Necessity Questionnaire  Ambulance transportation is medically necessary only if other means of transport are contraindicated or would be potentially harmful to the patient  To meet this requirement, the patient must either be "bed confined" or suffer from a condition such that transport by means other than ambulance is contraindicated by the patient's condition  The following questions must be answered by the medical professional signing below for this form to be valid:    1)  Describe the MEDICAL CONDITION (physical and/or mental) of this patient AT [address] [place] [address] that requires the patient to be transported in an ambulance and why transport by other means is contraindicated by the patient's condition:dementia    2) Is the patient "bed confined" as defined below? No  To be "be confined" the patient must satisfy all three of the following conditions: (1) unable to get up from bed without Assistance; AND (2) unable to ambulate; AND (3) unable to sit in a chair or wheelchair  3) Can this patient safely be transported by car or wheelchair van (i e , seated during transport without a medical attendant or monitoring)?    No    4) In addition to completing questions 1-3 above, please check any of the following conditions that apply*: *Note: supporting documentation for any boxes checked must be maintained in the patient's medical records  If hosp-hosp transfer, describe services needed at 2nd facility not available at 1st facility? Patient is confused  Danger to self/others  Medical attendant required   Requires oxygen-unable to self administer      Section III - Signature of Physician or Healthcare Professional  I certify that the above information is true and correct based on my evaluation of this patient, and represent that the patient requires transport by ambulance and that other forms of transport are contraindicated  I understand that this information will be used by the Centers for Medicare and Medicaid Services (CMS) to support the determination of medical necessity for ambulance services, and I represent that I have personal knowledge of the patient's condition at time of transport  [x]  If this box is checked, I also certify that the patient is physically or mentally incapable of signing the ambulance service's claim and that the institution with which I am affiliated has furnished care, services, or assistance to the patient  My signature below is made on behalf of the patient pursuant to 42 CFR §424 36(b)(4)  In accordance with 42 CFR §424 37, the specific reason(s) that the patient is physically or mentally incapable of signing the claim form is as follows: dementia   Signature of Physician* or Healthcare Professional______________________________________________________________  Signature Date 08/07/19 (For scheduled repetitive transports, this form is not valid for transports performed more than 60 days after this date)    Printed Name & Credentials of Physician or Healthcare Professional (MD, DO, RN, etc )________________________________  *Form must be signed by patient's attending physician for scheduled, repetitive transports   For non-repetitive, unscheduled ambulance transports, if unable to obtain the signature of the attending physician, any of the following may sign (choose appropriate option below)  [] Physician Assistant []  Clinical Nurse Specialist [x]  Registered Nurse  []  Nurse Practitioner  [x] Discharge Planner

## 2019-08-07 NOTE — ASSESSMENT & PLAN NOTE
On dysphagia 3 with nectar thick liquids  Seen and evaluated by speech therapy   No evidence of aspiration although slow swallowing was noted  Recommends dysphagia 2 with thin liquids-diet updated

## 2019-08-07 NOTE — SOCIAL WORK
CM reviewed pt care coordination rounds  Pt is medically stable anticipating d/c today  Script given for Eliquis  Script given to RN to add into packet to give to [facility]

## 2019-08-07 NOTE — ASSESSMENT & PLAN NOTE
Chest x-ray concerning for vascular congestion  Appears to be improved after trial of IV Lasix  BNP noted to be 5800 and albumin at 1 8 w/o prior hx of CHF  Continue IV Lasix 20 mg twice daily    Echocardiogram: Systolic function was normal  Ejection fraction was estimated to be 70 %  There were no regional wall motion abnormalities  Doppler parameters were consistent with abnormal left ventricular relaxation (grade 1 diastolic dysfunction)  MITRAL VALVE: There was mild annular calcification  PULMONARY ARTERIES: Systolic pressure was mildly to moderately increased  Estimated peak pressure was 45 mmHg, assuming normal right atrial pressures (IVC not visualized)      Will transition to oral Lasix 20 mg every other day- will be prescribed

## 2019-08-07 NOTE — TELEPHONE ENCOUNTER
Please call Yvette at [facility] regarding the patient Lopresor [phone] ALKA carrillo

## 2019-08-07 NOTE — ASSESSMENT & PLAN NOTE
Initially presented with sepsis and acute respiratory distress  Possible HCAP- viral bronchopneumonia vs vascular congestion  Likely viral in nature as procalcitonin negative x2   Repeat chest x-ray appears to be improving status post IV Lasix  Received cefepime/vanc/flagyl in ED  Stop antibiotics at this point given negative procalcitonin  Legionella strep pneumonia antigen negative      Bedside speech eval with no evidence of aspiration  Breathing status has improved with supportive care, supplemental oxygen, and breathing treatments

## 2019-08-07 NOTE — DISCHARGE SUMMARY
Discharge- Adilia Katz 6/26/1929, 80 y o  female MRN: [id number]    Unit/Bed#: E4 -01 Encounter: [id number]    Primary Care Provider: Sabino Agosto MD   Date and time admitted to hospital: 8/3/2019  1:16 PM      Discharge date:  8/7/19    Hospital course:    * Acute bronchopneumonia  Assessment & Plan  Initially presented with sepsis and acute respiratory distress  Possible HCAP- viral bronchopneumonia vs vascular congestion  Likely viral in nature as procalcitonin negative x2   Repeat chest x-ray appears to be improving status post IV Lasix  Received cefepime/vanc/flagyl in ED  Stop antibiotics at this point given negative procalcitonin  Legionella strep pneumonia antigen negative  Bedside speech eval with no evidence of aspiration  Breathing status has improved with supportive care, supplemental oxygen, and breathing treatments    Acute diastolic congestive heart failure ([facility] [address] )  Assessment & Plan  Chest x-ray concerning for vascular congestion  Appears to be improved after trial of IV Lasix  BNP noted to be 5800 and albumin at 1 8 w/o prior hx of CHF  Continue IV Lasix 20 mg twice daily    Echocardiogram: Systolic function was normal  Ejection fraction was estimated to be 70 %  There were no regional wall motion abnormalities  Doppler parameters were consistent with abnormal left ventricular relaxation (grade 1 diastolic dysfunction)  MITRAL VALVE: There was mild annular calcification  PULMONARY ARTERIES: Systolic pressure was mildly to moderately increased  Estimated peak pressure was 45 mmHg, assuming normal right atrial pressures (IVC not visualized)  Will transition to oral Lasix 20 mg every other day- will be prescribed    Acute respiratory failure [facility])  Assessment & Plan  Likely secondary to above  Titrate oxygen saturation >90% on 2L NC  Respiratory status appears to be improving  Saturations in the 90s    Has been tapered off oxygen    Sepsis ([facility] [address] )  Assessment & Plan  POA by tachypnea leukocytosis  Outpatient temp of 100 9 at long term nursing home  Lactic acid wnl  UA dirty but pt asymptomatic an urine culture growing mixed contaminants  Leukocytosis and tachypnea resolved    Abnormal urinalysis  Assessment & Plan  In chronic gray catheterization secondary neurogenic bladder  Likely secondary to asymptomatic bacteruria  Urine culture with mixed contaminants    Acute encephalopathy  Assessment & Plan  Presented with acute metabolic encephalopathy responsive to sternal rub on admission  Likely secondary to acute bronchopneumonia vs volume overload  Given underlying dementia continue to reorient and avoid over sedation  Mental status appears to be improving    Late onset Alzheimer's disease without behavioral disturbance  Assessment & Plan  At baseline relatively oriented to setting/year  Pt is level 3 dni/dnr- polst- abx and ivf hydration but no artificial nutrition or intubation/cpr  Depression  Assessment & Plan  Continue zoloft, and Remeron    Dysphagia, oropharyngeal phase  Assessment & Plan  On dysphagia 3 with nectar thick liquids  Seen and evaluated by speech therapy   No evidence of aspiration although slow swallowing was noted  Recommends dysphagia 2 with thin liquids-diet updated    Hypertension  Assessment & Plan  Continue metoprolol tartrate 12 5 mg twice daily    Urinary retention  Assessment & Plan  S/p chronic gray catheter exchanged on 8/3/19   Known to Dr Vladimir Cummings of Urology    Status post placement of cardiac pacemaker  Assessment & Plan  In place      Consultations During Hospital Stay:  · None    Procedures Performed:   · 8/3/19: CXR: Bilateral regions of alveolar opacity suggesting multifocal pneumonia    · 8/5/19:  Vidio barium swallow:  Summary:  Pt presented w/ at least mild oral stage, wfl pharyngeal stage this study  Mastication and transfer of soft solid were moderately to mod/severe  prolonged   Pt was unable to transfer the barium pill w/ Randa Anthony nliquid but transferred it w/ puree wnl  No aspiration observed this study, though pt coughed throughout the study  Per gross screening there was esophageal stasis w/ puree and soft solid that cleared w/ liquid  Images are on PACS for review       Recommendations:  Diet:dysphagia 2 mechanical soft   Liquids: thin  Meds:wgole in applesauce  Strategies: sips in between bites  Upright position  F/u ST tx: yes  Therapy Prognosis:favorable  CloseSupervision  Aspiration Precautions  Reflux Precautions  Results reviewed with: pt, nursing  Aspiration precautions posted  Seat upright 10-15 minutes prior to initiating meal to clear congestion prior to eating  · 8/5/19: Improved consolidation in the right lung likely improving pneumonia  Significant Findings / Test Results:   · Viral pneumonia  · Negative procalcitonin  · Acute CHF requiring diuretic    Incidental Findings:   · None    Test Results Pending at Discharge (will require follow up): · None     Outpatient follow-up Requested:  · PCP-1 week    Complications:  none    Discharge summary:     Juvenal Navas is a 80 y o  female patient who originally presented to the hospital on 8/3/2019 after an unresponsive episode her nursing home  For further details please refer to above  Condition at Discharge: stable     Discharge Day Visit / Exam:     Subjective:  Resting in bed  Breathing has significantly improved since admission  Able to take a deep breath without coughing  Denies any pain  Will be transferred back to [facility] today  Vitals: Blood Pressure: 148/68 (08/07/19 1100)  Pulse: 79 (08/07/19 1100)  Temperature: 98 7 °F (37 1 °C) (08/07/19 1100)  Temp Source: Temporal (08/07/19 1100)  Respirations: 20 (08/07/19 1100)  Weight - Scale: 67 9 kg (149 lb 11 1 oz) (08/07/19 0600)  SpO2: 90 % (08/07/19 1350)     Exam:   Physical Exam   Constitutional: She is oriented to person, place, and time  She appears well-developed and well-nourished     HENT: Head: Normocephalic and atraumatic  Cardiovascular: Normal rate, regular rhythm and normal heart sounds  Pulmonary/Chest: Effort normal and breath sounds normal  No stridor  No respiratory distress  She has no wheezes  She has no rales  She exhibits no tenderness  Abdominal: Soft  Bowel sounds are normal  She exhibits no distension and no mass  There is no tenderness  There is no rebound and no guarding  No hernia  Neurological: She is alert and oriented to person, place, and time  Psychiatric: She has a normal mood and affect  Her behavior is normal    Nursing note and vitals reviewed  Discussion with Family: nephew    Discharge instructions/Information to patient and family:   See after visit summary for information provided to patient and family  Provisions for Follow-Up Care:  See after visit summary for information related to follow-up care and any pertinent home health orders  Planned Readmission: no     Discharge Statement:  I spent 45 minutes discharging the patient  This time was spent on the day of discharge  I had direct contact with the patient on the day of discharge  Greater than 50% of the total time was spent examining patient, answering all patient questions, arranging and discussing plan of care with patient as well as directly providing post-discharge instructions  Additional time then spent on discharge activities  Discharge Medications:  See after visit summary for reconciled discharge medications provided to patient and family        ** Please Note: This note has been constructed using a voice recognition system **

## 2019-08-07 NOTE — ASSESSMENT & PLAN NOTE
Likely secondary to above  Titrate oxygen saturation >90% on 2L NC  Respiratory status appears to be improving  Saturations in the 90s    Has been tapered off oxygen

## 2019-08-08 LAB
BACTERIA BLD CULT: NORMAL
BACTERIA BLD CULT: NORMAL

## 2019-08-09 ENCOUNTER — NURSING HOME VISIT (OUTPATIENT)
Dept: GERIATRICS | Facility: OTHER | Age: 84
End: 2019-08-09
Payer: MEDICARE

## 2019-08-09 DIAGNOSIS — I50.31 ACUTE DIASTOLIC CONGESTIVE HEART FAILURE (HCC): ICD-10-CM

## 2019-08-09 DIAGNOSIS — I10 ESSENTIAL HYPERTENSION: ICD-10-CM

## 2019-08-09 DIAGNOSIS — R33.9 URINARY RETENTION: ICD-10-CM

## 2019-08-09 DIAGNOSIS — R26.2 AMBULATORY DYSFUNCTION: Primary | ICD-10-CM

## 2019-08-09 DIAGNOSIS — R13.12 DYSPHAGIA, OROPHARYNGEAL PHASE: ICD-10-CM

## 2019-08-09 DIAGNOSIS — F02.80 LATE ONSET ALZHEIMER'S DISEASE WITHOUT BEHAVIORAL DISTURBANCE (HCC): ICD-10-CM

## 2019-08-09 DIAGNOSIS — G30.1 LATE ONSET ALZHEIMER'S DISEASE WITHOUT BEHAVIORAL DISTURBANCE (HCC): ICD-10-CM

## 2019-08-09 PROCEDURE — 99305 1ST NF CARE MODERATE MDM 35: CPT | Performed by: FAMILY MEDICINE

## 2019-08-09 NOTE — ASSESSMENT & PLAN NOTE
On po Lasix, will check labs on Monday, will check wt  Daily, with close monitoring  Requires O2 now  Will continue monitoring   BNP 5800 at the hospital

## 2019-08-09 NOTE — PROGRESS NOTES
[facility]  [address] [place] [address]  Facility: [facility]    NAME: Cosme Montana  AGE: 80 y o  SEX: female    DATE OF ENCOUNTER: 8/9/2019    Code status:  No CPR    Assessment and Plan   Ambulatory dysfunction  With recent hospitalization and generalized weakness, needs PT/OT , STR and then LTC to improve gait, endurance, strength, ADLS, and transfer  Acute diastolic congestive heart failure (HCC)  On po Lasix, will check labs on Monday, will check wt  Daily, with close monitoring  Requires O2 now  Will continue monitoring  BNP 5800 at the hospital            Hypertension  BP stable since arrival, on Amlodipine, and Metoprolol, will monitor closely  Dysphagia, oropharyngeal phase  Has swallow study and evaluation, on pureed diet now, speech is working with pt  Late onset Alzheimer's disease without behavioral disturbance  Will continue with LTC and NH  Urinary retention  2nd to neurogenic bladder, no issues,gray in  Hyponatremia  Today's lab was reviewed, Na level WNL, stable  All medications and routine orders were reviewed and updated as needed  Plan discussed with: Patient, and nurses    Chief Complaint     Seen for admission at [facility] [address], "I'm feeling better"    History of Present Illness   Pt with Psychiatric and medical problem as this note who is a LTC pt at [facility] was sent to the hospital on 08/03/19 with change of mental status and was hospitalized initially with dx of bronchopneumonia and sepsis and was on IV antibiotic but then it was found that she had more CHF exacerbation and was put on Lasix IV and now on po Lasix, also with dx of viral URI, pt is feeling better, on O2 now, had labs today which was reviewed, BP has been stable since arrival, pt on pureed diet now and will have STR, with ST/OT/PT  No other specific issues or reports  NA level WNL today  Pt is afebrile  No issues with gray   Hospital discharge meds were not accurate since pt was on some meds which were discontinued while ago here at [facility]!     HISTORY:  Past Medical History:   Diagnosis Date    Altered mental status     Depression     Dysphagia, oropharyngeal phase     Hypertension     Multiple thyroid nodules     Pacemaker     Urinary retention      Family History   Problem Relation Age of Onset    No Known Problems Mother     No Known Problems Father      Social History     Socioeconomic History    Marital status: Single     Spouse name: None    Number of children: None    Years of education: None    Highest education level: None   Occupational History    None   Social Needs    Financial resource strain: None    Food insecurity:     Worry: None     Inability: None    Transportation needs:     Medical: None     Non-medical: None   Tobacco Use    Smoking status: Never Smoker    Smokeless tobacco: Never Used   Substance and Sexual Activity    Alcohol use: Never     Frequency: Never    Drug use: No    Sexual activity: Not Currently   Lifestyle    Physical activity:     Days per week: None     Minutes per session: None    Stress: None   Relationships    Social connections:     Talks on phone: None     Gets together: None     Attends Taoist service: None     Active member of club or organization: None     Attends meetings of clubs or organizations: None     Relationship status: None    Intimate partner violence:     Fear of current or ex partner: None     Emotionally abused: None     Physically abused: None     Forced sexual activity: None   Other Topics Concern    None   Social History Narrative    None       Allergies:  No Known Allergies    Review of Systems     Review of Systems   Unable to perform ROS: Dementia   Constitutional:        "I'm feeling better"       Medications and orders     All medications reviewed and updated in Nursing Home EMR         Objective     Vitals: WT:139Ibs   BP:138/64  Temp:97    Physical Exam Constitutional: She is oriented to person, place, and time  She appears well-developed and well-nourished  No distress  HENT:   Head: Normocephalic and atraumatic  Right Ear: External ear normal    Left Ear: External ear normal    Nose: Nose normal    Mouth/Throat: Oropharynx is clear and moist  No oropharyngeal exudate  [place]   Eyes: Pupils are equal, round, and reactive to light  Conjunctivae and EOM are normal  Right eye exhibits no discharge  Left eye exhibits no discharge  No scleral icterus  Neck: Normal range of motion  Neck supple  Cardiovascular: Normal rate, regular rhythm, normal heart sounds and intact distal pulses  Exam reveals no gallop and no friction rub  No murmur heard  Pulmonary/Chest: Effort normal and breath sounds normal  No stridor  No respiratory distress  She has no wheezes  She has no rales  She exhibits no tenderness  Abdominal: Soft  Bowel sounds are normal  She exhibits no distension and no mass  There is no tenderness  There is no rebound and no guarding  No hernia  Genitourinary:   Genitourinary Comments: Deferred, [place] bag to floor, concentrated urine  Musculoskeletal: Normal range of motion  She exhibits no edema, tenderness or deformity  In bed, limited ROM, sitting  Lymphadenopathy:     She has no cervical adenopathy  Neurological: She is alert and oriented to person, place, and time  A cranial nerve deficit is present  [place], follows commands, forgetful  Skin: Skin is warm and dry  No rash noted  She is not diaphoretic  No erythema  No pallor  Didn't examine sacral area    Psychiatric: She has a normal mood and affect  Her behavior is normal    Nursing note and vitals reviewed  Pertinent Laboratory/Diagnostic Studies: The following labs/studies were reviewed please see chart or hospital paperwork for details  CBCD, and CMP done today and reviewed  Procalcitonin negative twice    CXR: vascular congestion  Legionella strep negative        Jane Ludwig MD  2019 2:29 PM      Name: Raeann Lou  : 1929  MRN: [id number]  DOS: 2019  BILLING CODE: [id number]  Diagnoses:   Diagnosis ICD-10-CM Associated Orders   1  Ambulatory dysfunction R26 2    2  Acute diastolic congestive heart failure (HCC) I50 31    3  Essential hypertension I10    4  Dysphagia, oropharyngeal phase R13 12    5  Late onset Alzheimer's disease without behavioral disturbance G30 1     F02 80    6   Urinary retention R33 9

## 2019-08-09 NOTE — ASSESSMENT & PLAN NOTE
With recent hospitalization and generalized weakness, needs PT/OT , STR and then LTC to improve gait, endurance, strength, ADLS, and transfer

## 2019-08-09 NOTE — ASSESSMENT & PLAN NOTE
Today's lab was reviewed, Na level WNL, stable  [NL] : soft, non tender, non distended, normal bowel sounds, no hepatosplenomegaly

## 2019-08-30 ENCOUNTER — IN-CLINIC DEVICE VISIT (OUTPATIENT)
Dept: CARDIOLOGY CLINIC | Facility: CLINIC | Age: 84
End: 2019-08-30
Payer: MEDICARE

## 2019-08-30 DIAGNOSIS — Z95.0 PRESENCE OF CARDIAC PACEMAKER: ICD-10-CM

## 2019-08-30 DIAGNOSIS — I49.5 SSS (SICK SINUS SYNDROME) (HCC): Primary | ICD-10-CM

## 2019-08-30 PROCEDURE — 93280 PM DEVICE PROGR EVAL DUAL: CPT | Performed by: INTERNAL MEDICINE

## 2019-08-30 NOTE — PROGRESS NOTES
Results for orders placed or performed in visit on 08/30/19   Cardiac EP device report    Narrative    MDT DDD PPM  DEVICE INTERROGATED IN THE [place] OFFICE  BATTERY VOLTAGE ADEQUATE (8 YRS)  AP 67 3%  <0 1%  ALL LEAD PARAMETERS WITHIN NORMAL LIMITS  ALL OTHER TESTING WITHIN NORMAL LIMITS  1 VHR EPISODE W/EGRM SHOWING PAT, DURATION 12 BEATS @ ~ 154 BPM  EF - 75% (1/2017 ECHO)  PT TAKES METOPROLOL TART  NO PROGRAMMING CHANGES MADE TO DEVICE PARAMETERS  PACEMAKER FUNCTIONING APPROPRIATELY      EB

## 2019-09-11 ENCOUNTER — NURSING HOME VISIT (OUTPATIENT)
Dept: GERIATRICS | Facility: OTHER | Age: 84
End: 2019-09-11
Payer: MEDICARE

## 2019-09-11 DIAGNOSIS — F02.80 LATE ONSET ALZHEIMER'S DISEASE WITHOUT BEHAVIORAL DISTURBANCE (HCC): ICD-10-CM

## 2019-09-11 DIAGNOSIS — G30.1 LATE ONSET ALZHEIMER'S DISEASE WITHOUT BEHAVIORAL DISTURBANCE (HCC): ICD-10-CM

## 2019-09-11 DIAGNOSIS — M25.512 ACUTE PAIN OF LEFT SHOULDER: Primary | ICD-10-CM

## 2019-09-11 DIAGNOSIS — E87.1 HYPONATREMIA: ICD-10-CM

## 2019-09-11 DIAGNOSIS — R13.12 DYSPHAGIA, OROPHARYNGEAL PHASE: ICD-10-CM

## 2019-09-11 DIAGNOSIS — I10 ESSENTIAL HYPERTENSION: ICD-10-CM

## 2019-09-11 DIAGNOSIS — R33.9 URINARY RETENTION: ICD-10-CM

## 2019-09-11 PROBLEM — I50.32 CHRONIC DIASTOLIC CONGESTIVE HEART FAILURE (HCC): Status: ACTIVE | Noted: 2019-08-04

## 2019-09-11 PROCEDURE — 99309 SBSQ NF CARE MODERATE MDM 30: CPT | Performed by: FAMILY MEDICINE

## 2019-09-11 NOTE — ASSESSMENT & PLAN NOTE
Most likely OA related +/- rotator cuff tear, will continue with scheduled Tylenol, will add Bengay with close monitoring if not improving will check Xray

## 2019-09-11 NOTE — PROGRESS NOTES
[facility]  [address]  [phone]  Facility: [place] [address]          NAME: Romulo Elliott  AGE: 80 y o  SEX: female    DATE OF ENCOUNTER: 9/11/2019    Chief Complaint     L shoulder pain    History of Present Illness     HPI    The following portions of the patient's history were reviewed and updated as appropriate (from facility chart and hospital records): allergies, current medications, past family history, past medical history, past social history, past surgical history and problem list  Pt was seen and examined for f/uon dysphagia, Dementia, hyponatremia, HTN, CHF, urinary retention, pt continues with NH care, ST upgraded pt's diet to the regular diet today, pt has been having some L shoulder pain which she states is better with scheduled Tylenol, BP and Wt has been stable, no sign or symptoms of CHF exacerbation, NA level WNL on 08/09/19, H&H stable in August labs  Pt has no other concern or issues, states was woking with PT on using her WC and it might be the cause of her L shoulder pain but denies any falls or trauma to that shoulder  Review of Systems     Review of Systems   Constitutional: Negative  HENT: Negative  Eyes: Negative  Respiratory: Negative  Cardiovascular: Negative  Gastrointestinal: Negative  Endocrine: Negative  Genitourinary: Negative  Musculoskeletal: Negative  Left shoulder pain, with limited ROM, " when I don't move it, it doesn't hurt"   Skin: Negative  Allergic/Immunologic: Negative  Neurological: Negative  Hematological: Negative  Psychiatric/Behavioral: Negative  All other systems reviewed and are negative        Active Problem List     Patient Active Problem List   Diagnosis    Rhabdomyolysis    Elevated troponin    Accelerated hypertension    Status post placement of cardiac pacemaker    Urinary retention    Pacemaker    Hypertension    Dysphagia, oropharyngeal phase    Altered mental status    Depression    Late onset Alzheimer's disease without behavioral disturbance    Acute encephalopathy    Urinary tract infection    Elevated TSH    Thyroid nodule    Cough    Hyponatremia    Other constipation    Mild cognitive impairment    Abnormal urinalysis    Sepsis (HCC)    Acute bronchopneumonia    Acute respiratory failure (HCC)    Chronic diastolic congestive heart failure (HCC)    Ambulatory dysfunction    Acute pain of left shoulder       Objective     Vitals: Wt:141 2Ibs   BP:134/74  Afebrile  Physical Exam   Constitutional: She is oriented to person, place, and time  She appears well-developed and well-nourished  No distress  HENT:   Head: Normocephalic and atraumatic  Right Ear: External ear normal    Left Ear: External ear normal    Nose: Nose normal    Mouth/Throat: Oropharynx is clear and moist  No oropharyngeal exudate  [place]   Eyes: Pupils are equal, round, and reactive to light  Conjunctivae and EOM are normal  Right eye exhibits no discharge  Left eye exhibits no discharge  No scleral icterus  Neck: Normal range of motion  Neck supple  Cardiovascular: Normal rate, regular rhythm and normal heart sounds  Exam reveals no gallop and no friction rub  No murmur heard  Pulmonary/Chest: Effort normal and breath sounds normal  No stridor  No respiratory distress  She has no wheezes  She has no rales  She exhibits no tenderness  Abdominal: Soft  Bowel sounds are normal  She exhibits no distension and no mass  There is no tenderness  There is no rebound and no guarding  No hernia  Genitourinary:   Genitourinary Comments: Deferred  Musculoskeletal: She exhibits edema  She exhibits no tenderness or deformity  B/L LEs edema, limited ROM of l shoulder (passive or active movement)  Overall limited ROM of extremities while sitting in WC  Lymphadenopathy:     She has no cervical adenopathy     Neurological: She is alert and oriented to person, place, and time  A cranial nerve deficit is present  [place], forgetful  Skin: Skin is warm and dry  No rash noted  She is not diaphoretic  There is erythema  No pallor  Didn't examine sacral area  Psychiatric: She has a normal mood and affect  Her behavior is normal    Nursing note and vitals reviewed  Pertinent Laboratory/Diagnostic Studies:  19: CBC, CMP    Current Medications   Medication list in facility chart was reviewed and necessary changes made  Assessment and Plan   Acute pain of left shoulder  Most likely OA related +/- rotator cuff tear, will continue with scheduled Tylenol, will add Bengay with close monitoring if not improving will check Xray  Urinary retention  2nd to neurogenic bladder, gray in place, no issues  Hyponatremia  Na levels within normal limits on 19  Dysphagia, oropharyngeal phase  Resolved, pt will be on regular diet staring today per ST recommendation  Chronic diastolic congestive heart failure (HCC)  Wt is stable, on Lasix, no sign or symptoms of CHF exacerbation  Hypertension  Bp is stable with Norvasc, and Metoprolol  Late onset Alzheimer's disease without behavioral disturbance  No behaviors, needs NH care  Name: Jhonny Solano  : 1929  MRN: [id number]  DOS: 2019  BILLING CODE: [id number]  Diagnoses:   Diagnosis ICD-10-CM Associated Orders   1  Acute pain of left shoulder M25 512    2  Urinary retention R33 9    3  Hyponatremia E87 1    4  Dysphagia, oropharyngeal phase R13 12    5  Essential hypertension I10    6   Late onset Alzheimer's disease without behavioral disturbance G30 1     F02 80          Sandra Patel MD  /31315:29 PM

## 2019-09-16 ENCOUNTER — NURSING HOME VISIT (OUTPATIENT)
Dept: GERIATRICS | Facility: OTHER | Age: 84
End: 2019-09-16
Payer: MEDICARE

## 2019-09-16 DIAGNOSIS — H61.21 IMPACTED CERUMEN OF RIGHT EAR: Primary | ICD-10-CM

## 2019-09-16 PROCEDURE — 99308 SBSQ NF CARE LOW MDM 20: CPT | Performed by: FAMILY MEDICINE

## 2019-09-16 NOTE — PROGRESS NOTES
[facility]  [address]  [phone]  Facility: [place] [address]          NAME: Bambi Willis  AGE: 80 y o  SEX: female    DATE OF ENCOUNTER: 9/16/2019    Chief Complaint     "My right ear is clogged"    History of Present Illness     HPI    The following portions of the patient's history were reviewed and updated as appropriate (from facility chart and hospital records): allergies, current medications, past family history, past medical history, past social history, past surgical history and problem list   Pt was seen and examined per pt request of Right ear difficulty of hearing, pt reports her ear is "clogged", denies any pain, or drainage, reports [place] as the results of it  No problem with L ear  Review of Systems     Review of Systems   HENT: Positive for hearing loss  Negative for ear pain  'My right ear is clogged"       Active Problem List     Patient Active Problem List   Diagnosis    Rhabdomyolysis    Elevated troponin    Accelerated hypertension    Status post placement of cardiac pacemaker    Urinary retention    Pacemaker    Hypertension    Dysphagia, oropharyngeal phase    Altered mental status    Depression    Late onset Alzheimer's disease without behavioral disturbance    Acute encephalopathy    Urinary tract infection    Elevated TSH    Thyroid nodule    Cough    Hyponatremia    Other constipation    Mild cognitive impairment    Abnormal urinalysis    Sepsis (HCC)    Acute bronchopneumonia    Acute respiratory failure (HCC)    Chronic diastolic congestive heart failure (HCC)    Ambulatory dysfunction    Acute pain of left shoulder    Impacted cerumen of right ear       Objective     Vitals: afebrile    Physical Exam   HENT:   Head: Normocephalic and atraumatic     Right Ear: External ear normal    Left Ear: External ear normal    R Ear canal no lesion, + cerumen which is occluding the TM,no drainage, no pain on touch or exam, L ear some cerumen which is partially occluding the TM  B/L ears Normal external ear  Pertinent Laboratory/Diagnostic Studies:  No labs for this exam    Current Medications   Medication list in facility chart was reviewed and necessary changes made  Assessment and Plan   Impacted cerumen of right ear  Debrox otic drops for 3 days  Name: Ann Barth  : 1929  MRN: [id number]  DOS: 2019  BILLING CODE: [id number]  Diagnoses:   Diagnosis ICD-10-CM Associated Orders   1   Impacted cerumen of right ear H61 21          Gage Eisenberg MD  /[id number]:67 AM

## 2019-09-23 ENCOUNTER — NURSING HOME VISIT (OUTPATIENT)
Dept: GERIATRICS | Facility: OTHER | Age: 84
End: 2019-09-23
Payer: MEDICARE

## 2019-09-23 DIAGNOSIS — H61.21 IMPACTED CERUMEN OF RIGHT EAR: Primary | ICD-10-CM

## 2019-09-23 PROCEDURE — 99308 SBSQ NF CARE LOW MDM 20: CPT | Performed by: FAMILY MEDICINE

## 2019-09-23 NOTE — ASSESSMENT & PLAN NOTE
Will add Ofloxacin ear drops for 7 days for the local irritation and  infection of ear canal, and after resolving current problem will add debrox for the residual cerumen

## 2019-09-23 NOTE — PROGRESS NOTES
[facility]  [address]  [phone]  Facility: [place] [address]          NAME: Laura Benz  AGE: 80 y o  SEX: female    DATE OF ENCOUNTER: 9/23/2019    Chief Complaint     R ear hearing loss    History of Present Illness     HPI    The following portions of the patient's history were reviewed and updated as appropriate (from facility chart and hospital records): allergies, current medications, past family history, past medical history, past social history, past surgical history and problem list  Pt was seen and examined per staff reports [place] on right ear which continues, pt has finished her ear drops for her ear cerumen, and still reports can't hear well!     Review of Systems     Review of Systems   HENT:        "I can't hear" (points to her R ear)       Active Problem List     Patient Active Problem List   Diagnosis    Rhabdomyolysis    Elevated troponin    Accelerated hypertension    Status post placement of cardiac pacemaker    Urinary retention    Pacemaker    Hypertension    Dysphagia, oropharyngeal phase    Altered mental status    Depression    Late onset Alzheimer's disease without behavioral disturbance    Acute encephalopathy    Urinary tract infection    Elevated TSH    Thyroid nodule    Cough    Hyponatremia    Other constipation    Mild cognitive impairment    Abnormal urinalysis    Sepsis (HCC)    Acute bronchopneumonia    Acute respiratory failure (HCC)    Chronic diastolic congestive heart failure (HCC)    Ambulatory dysfunction    Acute pain of left shoulder    Impacted cerumen of right ear       Objective     Vitals: Afebrile    Physical Exam   HENT:   [place], R ear had lots of cerumen covering entire ear canal and TM, after procedure with a loop, great amount of cerumen was removed with very small/partial of TM visible, Ear canal was erythematous with small amount of bleeding on lateral side, whole procedure was 10 mins, Pertinent Laboratory/Diagnostic Studies:  No labs for this visit    Current Medications   Medication list in facility chart was reviewed and necessary changes made  Assessment and Plan     Impacted cerumen of right ear  Will add Ofloxacin ear drops for 7 days for the local irritation and possible infection of ear canal, and after resolving current problem will add debrox for the residual cerumen  Name: Maite Eller  : 1929  MRN: [id number]  DOS: 2019  BILLING CODE: [id number]  Diagnoses:   Diagnosis ICD-10-CM Associated Orders   1   Impacted cerumen of right ear H61 21          Kaylin Dia MD  /99343:03 PM

## 2019-09-30 ENCOUNTER — NURSING HOME VISIT (OUTPATIENT)
Dept: GERIATRICS | Facility: OTHER | Age: 84
End: 2019-09-30
Payer: MEDICARE

## 2019-09-30 DIAGNOSIS — H61.21 IMPACTED CERUMEN OF RIGHT EAR: Primary | ICD-10-CM

## 2019-09-30 DIAGNOSIS — M25.512 ACUTE PAIN OF LEFT SHOULDER: ICD-10-CM

## 2019-09-30 PROCEDURE — 99308 SBSQ NF CARE LOW MDM 20: CPT | Performed by: FAMILY MEDICINE

## 2019-09-30 NOTE — PROGRESS NOTES
[facility]  [address]  [phone]  Facility: [place] [address]          NAME: Ann Barth  AGE: 80 y o  SEX: female    DATE OF ENCOUNTER: 9/30/2019    Chief Complaint     "I hear better"    History of Present Illness     HPI    The following portions of the patient's history were reviewed and updated as appropriate (from facility chart and hospital records): allergies, current medications, past family history, past medical history, past social history, past surgical history and problem list   Pt was seen and examined for f/u her R ear cerumen impaction and hearing loss, pt was getting antibiotic ear drop and now states she is hearing better now even with not having her HA on  Pt states she still has some pain in her L shoulder and limited ROM, pt gets Tylenol and has patch to the  L shoulder  Review of Systems     Review of Systems   HENT: Negative  "I hear better now"       Active Problem List     Patient Active Problem List   Diagnosis    Rhabdomyolysis    Elevated troponin    Accelerated hypertension    Status post placement of cardiac pacemaker    Urinary retention    Pacemaker    Hypertension    Dysphagia, oropharyngeal phase    Altered mental status    Depression    Late onset Alzheimer's disease without behavioral disturbance    Acute encephalopathy    Urinary tract infection    Elevated TSH    Thyroid nodule    Cough    Hyponatremia    Other constipation    Mild cognitive impairment    Abnormal urinalysis    Sepsis (HCC)    Acute bronchopneumonia    Acute respiratory failure (HCC)    Chronic diastolic congestive heart failure (HCC)    Ambulatory dysfunction    Acute pain of left shoulder    Impacted cerumen of right ear       Objective     Vitals: Afebrile    Physical Exam   HENT:   Head: Normocephalic and atraumatic     Right Ear: External ear normal    Left Ear: External ear normal    Nose: Nose normal    Mouth/Throat: Oropharynx is clear and moist  No oropharyngeal exudate  R ear canal no lesion, no abnormality, R TM was visualized, small wax covering upper side of TM partially, no tenderness  Musculoskeletal:   Li mited ROM of passive and active movement of L shoulder       Pertinent Laboratory/Diagnostic Studies:  No labs    Current Medications   Medication list in facility chart was reviewed and no changes made  Assessment and Plan   Impacted cerumen of right ear  Improved with cerumen removal few days ago, with debrox, and antibiotic treatment, hearing better, no further intervention  Acute pain of left shoulder  Will continue with Bengay patch, and Tylenol, will refer pt to PT to improve strength, ROM, and pain  Name: Juvenal Navas  : 1929  MRN: [id number]  DOS: 2019  BILLING CODE: [id number]  Diagnoses:   Diagnosis ICD-10-CM Associated Orders   1  Impacted cerumen of right ear H61 21    2   Acute pain of left shoulder M25 512          Barbara Roca MD  201910:59 AM

## 2019-09-30 NOTE — ASSESSMENT & PLAN NOTE
Improved with cerumen removal few days ago, with debrox, and antibiotic treatment, hearing better, no further intervention

## 2019-09-30 NOTE — ASSESSMENT & PLAN NOTE
Will continue with Bengay patch, and Tylenol, will refer pt to PT to improve strength, ROM, and pain

## 2019-10-14 ENCOUNTER — NURSING HOME VISIT (OUTPATIENT)
Dept: GERIATRICS | Facility: OTHER | Age: 84
End: 2019-10-14
Payer: MEDICARE

## 2019-10-14 DIAGNOSIS — F02.80 LATE ONSET ALZHEIMER'S DISEASE WITHOUT BEHAVIORAL DISTURBANCE (HCC): Primary | ICD-10-CM

## 2019-10-14 DIAGNOSIS — G30.1 LATE ONSET ALZHEIMER'S DISEASE WITHOUT BEHAVIORAL DISTURBANCE (HCC): Primary | ICD-10-CM

## 2019-10-14 DIAGNOSIS — I10 ESSENTIAL HYPERTENSION: ICD-10-CM

## 2019-10-14 DIAGNOSIS — I50.32 CHRONIC DIASTOLIC CONGESTIVE HEART FAILURE (HCC): ICD-10-CM

## 2019-10-14 DIAGNOSIS — R33.9 URINARY RETENTION: ICD-10-CM

## 2019-10-14 DIAGNOSIS — E87.1 HYPONATREMIA: ICD-10-CM

## 2019-10-14 PROCEDURE — 99309 SBSQ NF CARE MODERATE MDM 30: CPT | Performed by: FAMILY MEDICINE

## 2019-10-14 NOTE — PROGRESS NOTES
[facility]  [address]  [phone]  [facility]          NAME: Ericka Call  AGE: 80 y o  SEX: female    DATE OF ENCOUNTER: 10/14/2019    Chief Complaint     No complaint  History of Present Illness     HPI    The following portions of the patient's history were reviewed and updated as appropriate (from facility chart and hospital records): allergies, current medications, past family history, past medical history, past social history, past surgical history and problem list   Pt was seen and examined for f/u on L shoulder pain, hyponatremia, Urinary retention, Dysphagia, CHF, HTN, Dementia, pt's xray of L shoulder was negative, pt continues with NH care, is getting therapy for L shoulder, continues with NH care, no problem with gray cath, no sign or symptoms of CHF exacerbation, wt and BP are stable, hears better and no more ears pain, overall stable  NA level WNL in August  Pt states her L shoulder still has some discomfort, but tylenol and patches are helping  On PT  Review of Systems     Review of Systems   Constitutional: Negative  HENT: Negative  Eyes: Negative  Respiratory: Negative  Cardiovascular: Negative  Gastrointestinal: Negative  Endocrine: Negative  Genitourinary: Negative  Musculoskeletal: Negative  "My left shoulder" referring to her pain, and limited ROM  Skin: Negative  Allergic/Immunologic: Negative  Neurological: Negative  Hematological: Negative  Psychiatric/Behavioral: Negative  All other systems reviewed and are negative        Active Problem List     Patient Active Problem List   Diagnosis    Rhabdomyolysis    Elevated troponin    Accelerated hypertension    Status post placement of cardiac pacemaker    Urinary retention    Pacemaker    Hypertension    Dysphagia, oropharyngeal phase    Altered mental status    Depression    Late onset Alzheimer's disease without behavioral disturbance (HCC)    Acute encephalopathy    Urinary tract infection    Elevated TSH    Thyroid nodule    Cough    Hyponatremia    Other constipation    Mild cognitive impairment    Abnormal urinalysis    Sepsis (HCC)    Acute bronchopneumonia    Acute respiratory failure (HCC)    Chronic diastolic congestive heart failure (HCC)    Ambulatory dysfunction    Acute pain of left shoulder    Impacted cerumen of right ear       Objective     Vitals: WT:142 1 Ibs     BP: 130/70        Physical Exam   Constitutional: She appears well-developed and well-nourished  No distress  HENT:   Head: Normocephalic and atraumatic  Right Ear: External ear normal    Left Ear: External ear normal    Nose: Nose normal    Mouth/Throat: Oropharynx is clear and moist  No oropharyngeal exudate  [place]   Eyes: Pupils are equal, round, and reactive to light  Conjunctivae and EOM are normal  Right eye exhibits no discharge  Left eye exhibits no discharge  No scleral icterus  Neck: Normal range of motion  Neck supple  Cardiovascular: Normal rate, regular rhythm and normal heart sounds  Exam reveals no gallop and no friction rub  No murmur heard  Pulmonary/Chest: Effort normal and breath sounds normal  No stridor  No respiratory distress  She has no wheezes  She has no rales  She exhibits no tenderness  Abdominal: Soft  Bowel sounds are normal  She exhibits no distension and no mass  There is no tenderness  There is no rebound and no guarding  No hernia  Genitourinary:   Genitourinary Comments: Deferred  Musculoskeletal: She exhibits edema  She exhibits no tenderness or deformity  Limited ROM of L shoulder, moves rest of extremities but with limitation while sitting in a WC  +B/L LEs edema  Lymphadenopathy:     She has no cervical adenopathy  Neurological: A cranial nerve deficit is present  [place], forgetful, follows commands   Skin: Skin is warm and dry  No rash noted  She is not diaphoretic   No erythema  No pallor  Didn't examine sacral area  Psychiatric: She has a normal mood and affect  Her behavior is normal    Nursing note and vitals reviewed  Pertinent Laboratory/Diagnostic Studies:  19:  CMP, CBC    Current Medications   Medication list in facility chart was reviewed and no changes made  Assessment and Plan   Late onset Alzheimer's disease without behavioral disturbance  Pt stable with NH Care, will continue monitoring  Chronic diastolic congestive heart failure (HCC)  No sign or symptoms of exacerbation, stable wt, on Lasix, will continue with monitoring  Hypertension  BP stable with Metoprolol, and Amlodipine, will continue with monitoring  Hyponatremia  Last Na level in August WNL, will continue with monitoring  Urinary retention  2nd to neurogenic bladder, functions well, no issues  Name: Catarina Mary  : 1929  MRN: [id number]  DOS: 10/14/2019  BILLING CODE: [id number]  Diagnoses:   Diagnosis ICD-10-CM Associated Orders   1  Late onset Alzheimer's disease without behavioral disturbance (HCC) G30 1     F02 80    2  Chronic diastolic congestive heart failure (HCC) I50 32    3  Essential hypertension I10    4  Hyponatremia E87 1    5   Urinary retention R33 9          Ender Turner MD  10/14/22525:21 PM

## 2019-11-12 NOTE — PROGRESS NOTES
11/14/2019      Chief Complaint   Patient presents with    Urinary Retention       Assessment and Plan    80 y o  female managed by Dr Aneta Arzola    1  Urinary retention managed by chronic indwelling urethral catheter   - maintain indwelling urethral catheter to gravity drainage  - routine catheter care daily  - routine changes every 4 weeks    2  Moderate right sided hydronephrosis and a 2 5cm posterior bladder wall lesion  - discussed with the patient she has what appears to be a bladder lesion, it is possible this is blocking her ureter and causing swelling of the kidney  - to know for sure that she has a lesion and if it is in fact causing obstruction we would need to perform cystoscopy, alternatively we can observe, the patient is agreeable to cystoscopy   - thankfully, she is asymptomatic at this time, should call with any hematuria or right flank pain    FU cystoscopy       History of Present Illness  Jeromy Eng is a 80 y o  female here for follow up evaluation of urinary retention  This is managed with a chronic indwelling urethral catheter  The patient presents today doing well  She has no complaints and denies any trouble with her catheter  This is changed every 4 weeks  Today her catheter is draining clear yellow urine  She does present today with an U/S revealing moderate right hydronephrosis and a 2 5 cm bladder lesion posteriorly  The patient denies any flank pain or hematuria  Her Cr is normal at 0 62  Review of Systems   Constitutional: Negative for activity change, chills and fever  Gastrointestinal: Negative for abdominal distention and abdominal pain  Musculoskeletal: Negative for back pain and gait problem  Psychiatric/Behavioral: Negative for behavioral problems and confusion         Past Medical History  Past Medical History:   Diagnosis Date    Accelerated hypertension 1/17/2017    Altered mental status     Depression     Dysphagia, oropharyngeal phase     Hypertension  Multiple thyroid nodules     Pacemaker     Urinary retention        Past Social History  Past Surgical History:   Procedure Laterality Date    CARDIAC PACEMAKER PLACEMENT       Social History     Tobacco Use   Smoking Status Never Smoker   Smokeless Tobacco Never Used       Past Family History  Family History   Problem Relation Age of Onset    No Known Problems Mother     No Known Problems Father        Past Social history  Social History     Socioeconomic History    Marital status: Single     Spouse name: Not on file    Number of children: Not on file    Years of education: Not on file    Highest education level: Not on file   Occupational History    Not on file   Social Needs    Financial resource strain: Not on file    Food insecurity:     Worry: Not on file     Inability: Not on file    Transportation needs:     Medical: Not on file     Non-medical: Not on file   Tobacco Use    Smoking status: Never Smoker    Smokeless tobacco: Never Used   Substance and Sexual Activity    Alcohol use: Never     Frequency: Never    Drug use: No    Sexual activity: Not Currently   Lifestyle    Physical activity:     Days per week: Not on file     Minutes per session: Not on file    Stress: Not on file   Relationships    Social connections:     Talks on phone: Not on file     Gets together: Not on file     Attends [employer] service: Not on file     Active member of club or organization: Not on file     Attends meetings of clubs or organizations: Not on file     Relationship status: Not on file    Intimate partner violence:     Fear of current or ex partner: Not on file     Emotionally abused: Not on file     Physically abused: Not on file     Forced sexual activity: Not on file   Other Topics Concern    Not on file   Social History Narrative    Not on file       Current Medications  Current Outpatient Medications   Medication Sig Dispense Refill    acetaminophen (TYLENOL) 500 mg tablet Take 500 mg by mouth every 6 (six) hours as needed for mild pain      albuterol (2 5 mg/3 mL) 0 083 % nebulizer solution Take by nebulization every 6 (six) hours as needed for wheezing      bisacodyl (DULCOLAX) 10 mg suppository Insert 10 mg into the rectum daily      Calcium Carbonate (CALCIUM 600 PO) Take 600 mg by mouth 2 (two) times a day      cyanocobalamin (VITAMIN B-12) 500 mcg tablet Take 500 mcg by mouth daily      ferrous sulfate 325 (65 Fe) mg tablet Take 325 mg by mouth daily at bedtime      folic acid (FOLVITE) 1 mg tablet Take 1 mg by mouth daily      furosemide (LASIX) 20 mg tablet Take 1 tablet (20 mg total) by mouth every other day 30 tablet 0    Magnesium Hydroxide (MILK OF MAGNESIA PO) Take 30 mL by mouth daily as needed      metoprolol tartrate (LOPRESSOR) 25 mg tablet Take 12 5 mg by mouth 2 (two) times a day      mirtazapine (REMERON) 7 5 MG tablet Take 1 tablet by mouth daily at bedtime  0    ondansetron (ZOFRAN) 4 mg tablet Take 4 mg by mouth every 8 (eight) hours as needed for nausea or vomiting      polyethylene glycol (MIRALAX) 17 g packet Take 17 g by mouth daily as needed (Constipation) for up to 30 days 510 g 0    sertraline (ZOLOFT) 25 mg tablet Take 25 mg by mouth daily       No current facility-administered medications for this visit  Allergies  No Known Allergies      The following portions of the patient's history were reviewed and updated as appropriate: allergies, current medications, past medical history, past social history, past surgical history and problem list       Vitals  Vitals:    11/14/19 1051   BP: 122/68   Pulse: 62   Weight: 67 6 kg (149 lb)   Height: 5' 6" (1 676 m)           Physical Exam  Constitutional   General appearance: Patient is seated and in no acute distress, well appearing and well nourished  Head and Face   Head and face: Normal     Eyes   Conjunctiva and lids: No erythema, swelling or discharge      Ears, Nose, Mouth, and Throat   Hearing: Normal     Pulmonary   Respiratory effort: No increased work of breathing or signs of respiratory distress  Cardiovascular   Examination of extremities for edema and/or varicosities: Normal     Abdomen   Abdomen: Non-tender, no masses  Musculoskeletal   Gait and station: Wheel chair  Skin   Skin and subcutaneous tissue: Warm, dry, and intact  No visible lesions or rashes  Psychiatric   Judgment and insight: Normal  Recent and remote memory:  Normal  Mood and affect: Normal      Results  No results found for this or any previous visit (from the past 1 hour(s))  ]  No results found for: PSA  Lab Results   Component Value Date    GLUCOSE 103 01/17/2017    CALCIUM 8 5 08/06/2019    K 3 8 08/06/2019    CO2 33 (H) 08/06/2019     08/06/2019    BUN 9 08/06/2019    CREATININE 0 62 08/06/2019     Lab Results   Component Value Date    WBC 7 86 08/06/2019    HGB 10 4 (L) 08/06/2019    HCT 32 2 (L) 08/06/2019     (H) 08/06/2019     08/06/2019       Orders  No orders of the defined types were placed in this encounter

## 2019-11-14 ENCOUNTER — OFFICE VISIT (OUTPATIENT)
Dept: UROLOGY | Facility: CLINIC | Age: 84
End: 2019-11-14
Payer: MEDICARE

## 2019-11-14 VITALS
HEART RATE: 62 BPM | DIASTOLIC BLOOD PRESSURE: 68 MMHG | BODY MASS INDEX: 23.95 KG/M2 | SYSTOLIC BLOOD PRESSURE: 122 MMHG | WEIGHT: 149 LBS | HEIGHT: 66 IN

## 2019-11-14 DIAGNOSIS — R33.9 URINARY RETENTION: ICD-10-CM

## 2019-11-14 DIAGNOSIS — N32.9 LESION OF BLADDER: Primary | ICD-10-CM

## 2019-11-14 PROCEDURE — 99213 OFFICE O/P EST LOW 20 MIN: CPT | Performed by: PHYSICIAN ASSISTANT

## 2019-11-15 ENCOUNTER — NURSING HOME VISIT (OUTPATIENT)
Dept: GERIATRICS | Facility: OTHER | Age: 84
End: 2019-11-15
Payer: MEDICARE

## 2019-11-15 DIAGNOSIS — R33.9 URINARY RETENTION: Primary | ICD-10-CM

## 2019-11-15 DIAGNOSIS — G30.1 LATE ONSET ALZHEIMER'S DISEASE WITHOUT BEHAVIORAL DISTURBANCE (HCC): ICD-10-CM

## 2019-11-15 DIAGNOSIS — I10 ESSENTIAL HYPERTENSION: ICD-10-CM

## 2019-11-15 DIAGNOSIS — I50.32 CHRONIC DIASTOLIC CONGESTIVE HEART FAILURE (HCC): ICD-10-CM

## 2019-11-15 DIAGNOSIS — G89.29 CHRONIC LEFT SHOULDER PAIN: ICD-10-CM

## 2019-11-15 DIAGNOSIS — K59.09 OTHER CONSTIPATION: ICD-10-CM

## 2019-11-15 DIAGNOSIS — M25.512 CHRONIC LEFT SHOULDER PAIN: ICD-10-CM

## 2019-11-15 DIAGNOSIS — F02.80 LATE ONSET ALZHEIMER'S DISEASE WITHOUT BEHAVIORAL DISTURBANCE (HCC): ICD-10-CM

## 2019-11-15 PROBLEM — M62.82 RHABDOMYOLYSIS: Status: RESOLVED | Noted: 2017-01-17 | Resolved: 2019-11-15

## 2019-11-15 PROBLEM — J96.00 ACUTE RESPIRATORY FAILURE (HCC): Status: RESOLVED | Noted: 2019-08-03 | Resolved: 2019-11-15

## 2019-11-15 PROBLEM — N39.0 URINARY TRACT INFECTION: Status: RESOLVED | Noted: 2017-08-18 | Resolved: 2019-11-15

## 2019-11-15 PROBLEM — J18.0 ACUTE BRONCHOPNEUMONIA: Status: RESOLVED | Noted: 2019-08-03 | Resolved: 2019-11-15

## 2019-11-15 PROCEDURE — 99309 SBSQ NF CARE MODERATE MDM 30: CPT | Performed by: FAMILY MEDICINE

## 2019-11-15 NOTE — ASSESSMENT & PLAN NOTE
Due to neurogenic bladder, continues with gray cath, now has bladder lesion and R hydronephrosis, will have cystoscopy per urologist recommendation

## 2019-11-15 NOTE — PROGRESS NOTES
[facility]  [address]  [phone]  Facility: [place] [address]          NAME: Neta Epley  AGE: 80 y o  SEX: female    DATE OF ENCOUNTER: 11/15/2019    Chief Complaint     Pt has no complaint    History of Present Illness     HPI    The following portions of the patient's history were reviewed and updated as appropriate (from facility chart and hospital records): allergies, current medications, past family history, past medical history, past social history, past surgical history and problem list   Pt was seen and examined for f/uon Dementia, HTN,CHF,  urinary retention, and OA /L shoulder pain, pt has better ROM on R shoulder now with getting OT, BP and wt has been stable, continues with NH care, on restorative care for walking otherwise moves with her WC  No behaviors, pt had recent retroperitoneal ultrasound which showed Moderate R hydronephrosis and bladder lesion, pt has urology consult yesterday and per urologist she was recommended to have cystoscopy which she agreed  No other issues or concerns  No sign or symptoms of CHF exacerbation  Review of Systems     Review of Systems   Constitutional:        Pt overall has no complaint  All other systems reviewed and are negative  Active Problem List     Patient Active Problem List   Diagnosis    Elevated troponin    Status post placement of cardiac pacemaker    Urinary retention    Pacemaker    Hypertension    Depression    Late onset Alzheimer's disease without behavioral disturbance (HCC)    Acute encephalopathy    Elevated TSH    Cough    Hyponatremia    Other constipation    Mild cognitive impairment    Abnormal urinalysis    Sepsis (HCC)    Chronic diastolic congestive heart failure (HCC)    Chronic left shoulder pain    Impacted cerumen of right ear       Objective     Vitals: Wt:114 2Ibs   BP:138/60  Temp:98    Physical Exam   Constitutional: She is oriented to person, place, and time   She appears well-developed and well-nourished  No distress  HENT:   Head: Normocephalic and atraumatic  Right Ear: External ear normal    Left Ear: External ear normal    Nose: Nose normal    Mouth/Throat: Oropharynx is clear and moist  No oropharyngeal exudate  Eyes: Pupils are equal, round, and reactive to light  Conjunctivae and EOM are normal  Right eye exhibits no discharge  Left eye exhibits no discharge  No scleral icterus  Neck: Normal range of motion  Neck supple  Cardiovascular: Normal rate, regular rhythm and normal heart sounds  Exam reveals no gallop and no friction rub  No murmur heard  Pace maker on R ACW  Pulmonary/Chest: Effort normal and breath sounds normal  No stridor  No respiratory distress  She has no wheezes  She has no rales  She exhibits no tenderness  Abdominal: Soft  Bowel sounds are normal  She exhibits no distension and no mass  There is no tenderness  There is no rebound and no guarding  No hernia  Gray bag to the floor  Genitourinary:   Genitourinary Comments: Deferred  Musculoskeletal: Normal range of motion  She exhibits edema  She exhibits no tenderness or deformity  In WC , sitting, limited ROM of UES, and LES while sitting, +Edema of LEs  Lymphadenopathy:     She has no cervical adenopathy  Neurological: She is oriented to person, place, and time  A cranial nerve deficit is present  forgetful   Skin: Skin is warm and dry  No rash noted  She is not diaphoretic  No erythema  No pallor  Didn't examine sacral area  Psychiatric: She has a normal mood and affect  Her behavior is normal    Nursing note and vitals reviewed  Pertinent Laboratory/Diagnostic Studies:  CBC, CMP in August    Current Medications   Medication list in facility chart was reviewed and no changes made         Assessment and Plan     Urinary retention  Due to neurogenic bladder, continues with gray cath, now has bladder lesion and R hydronephrosis, will have cystoscopy per urologist recommendation  Late onset Alzheimer's disease without behavioral disturbance  Continues with NH care  Stable with wt  No behaviors  Hypertension  BP is stable with Metoprolol and Amlodipine, last lab was done in August, stable  Chronic diastolic congestive heart failure (HCC)  No exacerbation, wt is stable, will continue with Lasix, and monitoring  Chronic left shoulder pain  Better ROM with therapy, pain well controlled with Tylenol  Other constipation  Stable with current bowel protocol  Name: Joaquina Hidalgo  : 1929  MRN: [id number]  DOS: 11/15/2019  BILLING CODE: [id number]  Diagnoses:   Diagnosis ICD-10-CM Associated Orders   1  Urinary retention R33 9    2  Late onset Alzheimer's disease without behavioral disturbance (HCC) G30 1     F02 80    3  Essential hypertension I10    4  Chronic diastolic congestive heart failure (HCC) I50 32    5  Chronic left shoulder pain M25 512     G89 29    6   Other constipation K59 09          Alber Avendano MD  /00535:51 PM

## 2019-11-18 ENCOUNTER — TELEPHONE (OUTPATIENT)
Dept: UROLOGY | Facility: MEDICAL CENTER | Age: 84
End: 2019-11-18

## 2019-11-18 NOTE — TELEPHONE ENCOUNTER
Patient was seen on 11/14/ by Gracy Navarrete in [address] [facility] office  As per note patient needs a cysto  Offered first available 01/10/2020 at 3 pm is this acceptable?   Please advise

## 2019-12-18 ENCOUNTER — NURSING HOME VISIT (OUTPATIENT)
Dept: GERIATRICS | Facility: OTHER | Age: 84
End: 2019-12-18
Payer: MEDICARE

## 2019-12-18 DIAGNOSIS — I50.32 CHRONIC DIASTOLIC CONGESTIVE HEART FAILURE (HCC): ICD-10-CM

## 2019-12-18 DIAGNOSIS — F02.80 LATE ONSET ALZHEIMER'S DISEASE WITHOUT BEHAVIORAL DISTURBANCE (HCC): Primary | ICD-10-CM

## 2019-12-18 DIAGNOSIS — R33.9 URINARY RETENTION: ICD-10-CM

## 2019-12-18 DIAGNOSIS — I10 ESSENTIAL HYPERTENSION: ICD-10-CM

## 2019-12-18 DIAGNOSIS — G30.1 LATE ONSET ALZHEIMER'S DISEASE WITHOUT BEHAVIORAL DISTURBANCE (HCC): Primary | ICD-10-CM

## 2019-12-18 PROCEDURE — 99309 SBSQ NF CARE MODERATE MDM 30: CPT | Performed by: FAMILY MEDICINE

## 2019-12-18 NOTE — PROGRESS NOTES
[facility]  [address]  [phone]  Facility: [place] [address]          NAME: Radha Madrid  AGE: 80 y o  SEX: female    DATE OF ENCOUNTER: 12/18/2019    Chief Complaint     Pt has no complaint    History of Present Illness     HPI    The following portions of the patient's history were reviewed and updated as appropriate (from facility chart and hospital records): allergies, current medications, past family history, past medical history, past social history, past surgical history and problem list   Pt was seen and examined for f/u on Urinary retention, Dementia, CHF, HTN, and new report of nursing aid of [place] lump ( not specific which side, and it was reported after an aid had pt's care couple of days ago)  BP, and wt has been stable, no sign or symptoms of CHF exacerbation, pt has appointment with urology on 1/1/20 for cystoscopy, no issues with gray  Pt continues with NH care, no pain  Moves with her WC, but also walks with staff/on restorative care  Review of Systems     Review of Systems   Constitutional: Negative  HENT: Negative  Eyes: Negative  Respiratory: Negative  Cardiovascular: Negative  Gastrointestinal: Negative  Endocrine: Negative  Genitourinary: Negative  Musculoskeletal: Negative  Skin: Negative  Allergic/Immunologic: Negative  Neurological: Negative  Hematological: Negative  Psychiatric/Behavioral: Negative  All other systems reviewed and are negative        Active Problem List     Patient Active Problem List   Diagnosis    Elevated troponin    Status post placement of cardiac pacemaker    Urinary retention    Pacemaker    Hypertension    Depression    Late onset Alzheimer's disease without behavioral disturbance (HCC)    Acute encephalopathy    Elevated TSH    Cough    Hyponatremia    Other constipation    Mild cognitive impairment    Abnormal urinalysis    Sepsis (HCC)    Chronic diastolic congestive heart failure (HCC)    Chronic left shoulder pain    Impacted cerumen of right ear       Objective     Vitals:Wt:149IBs        BP:116/60    Physical Exam   Constitutional: She is oriented to person, place, and time  She appears well-developed and well-nourished  No distress  HENT:   Head: Normocephalic and atraumatic  Right Ear: External ear normal    Left Ear: External ear normal    Nose: Nose normal    Mouth/Throat: Oropharynx is clear and moist  No oropharyngeal exudate  [place]   Eyes: Pupils are equal, round, and reactive to light  Conjunctivae and EOM are normal  Right eye exhibits no discharge  Left eye exhibits no discharge  No scleral icterus  Neck: Normal range of motion  Neck supple  Cardiovascular: Normal rate, regular rhythm and normal heart sounds  Exam reveals no gallop and no friction rub  No murmur heard  Pacer on L ACW   Pulmonary/Chest: Effort normal and breath sounds normal  No stridor  No respiratory distress  She has no wheezes  She has no rales  She exhibits no tenderness  Abdominal: Soft  Bowel sounds are normal  She exhibits no distension and no mass  There is no tenderness  There is no rebound and no guarding  No hernia  Genitourinary:   Genitourinary Comments: External genital area has no lesion, no erythema, no discomfort, gray in place, limited exam with pt thightening her thighs and legs  Musculoskeletal: She exhibits no edema, tenderness or deformity  In bed, limited ROM while sitting in bed  Lymphadenopathy:     She has no cervical adenopathy  Neurological: She is alert and oriented to person, place, and time  A cranial nerve deficit is present  [place], forgetful   Skin: Skin is warm and dry  No rash noted  She is not diaphoretic  There is erythema  No pallor  Didn't examine sacral area  Psychiatric: She has a normal mood and affect  Her behavior is normal    Nursing note and vitals reviewed        Pertinent Laboratory/Diagnostic Studies:  No new lab since last visit in November    Current Medications   Medication list in facility chart was reviewed and no changes made  Assessment and Plan     Late onset Alzheimer's disease without behavioral disturbance  Stable, no behaviors, needs NH care  Chronic diastolic congestive heart failure (HCC)  No sign or symptoms of CHF exacerbation, on Lasix, stable  Hypertension  BP has been stable  On Metoprolol,and Amlodipine, will continue with monitoring  Urinary retention  Will have cystoscopy on 20 for newly found bladder lesion and hydronephrosis, will check labs in early January  Name: Job Jo  : 1929  MRN: [id number]  DOS: 2019  BILLING CODE: [id number]  Diagnoses:   Diagnosis ICD-10-CM Associated Orders   1  Late onset Alzheimer's disease without behavioral disturbance (HCC) G30 1     F02 80    2  Chronic diastolic congestive heart failure (HCC) I50 32    3  Essential hypertension I10    4   Urinary retention R33 9          Cortes Rivera MD  66622:38 PM

## 2019-12-18 NOTE — ASSESSMENT & PLAN NOTE
Will have cystoscopy on 1/1/20 for newly found bladder lesion and hydronephrosis, will check labs in early January

## 2020-01-06 ENCOUNTER — TELEPHONE (OUTPATIENT)
Dept: UROLOGY | Facility: MEDICAL CENTER | Age: 85
End: 2020-01-06

## 2020-01-06 NOTE — TELEPHONE ENCOUNTER
Patient of Dr Pelon Morrissey at [facility] office  Pateint scheduled for cysto on 1/10  [facility] will send catheter supplies along for a catheter change post cysto  Please call [facility] if this is not acceptable    They can be reached at [phone]  Thank you

## 2020-01-10 ENCOUNTER — PROCEDURE VISIT (OUTPATIENT)
Dept: UROLOGY | Facility: CLINIC | Age: 85
End: 2020-01-10
Payer: MEDICARE

## 2020-01-10 VITALS — DIASTOLIC BLOOD PRESSURE: 62 MMHG | SYSTOLIC BLOOD PRESSURE: 110 MMHG | HEART RATE: 92 BPM

## 2020-01-10 DIAGNOSIS — N32.9 LESION OF BLADDER: Primary | ICD-10-CM

## 2020-01-10 PROCEDURE — 52000 CYSTOURETHROSCOPY: CPT | Performed by: UROLOGY

## 2020-01-10 RX ORDER — AMLODIPINE BESYLATE 2.5 MG/1
5 TABLET ORAL DAILY
COMMUNITY
End: 2021-06-05 | Stop reason: HOSPADM

## 2020-01-10 RX ORDER — ACETAMINOPHEN 650 MG/1
650 SUPPOSITORY RECTAL EVERY 4 HOURS PRN
COMMUNITY

## 2020-01-10 NOTE — PROGRESS NOTES
Cystoscopy  Date/Time: 1/10/2020 3:09 PM  Performed by: Judith Sherwood MD  Authorized by: Judith Sherwood MD     Procedure details: cystoscopy    Patient tolerance: Patient tolerated the procedure well with no immediate complications    Additional Procedure Details:   Cystoscopy Procedure note    Risk and benefits of flexible cystoscopy were discussed  Informed consent was obtained  Urethral catheter removed prior to procedure        The patient was placed into the modified supine position  Her genitalia was prepped with Betadine and draped in a sterile fashion  Viscous 2% lidocaine was instilled into the urethra and allowed dwell time for local anesthesia  Flexible cystoscopy was then performed with a 16F scope  Urethra was inspected on entrance and exit of the scope  The bladder was thoroughly inspected in a 360 degree fashion  Both ureteral orifices were visualized in their orthotopic location with clear efflux of urine  The bladder mucosa was thoroughly inspected  In the posterior bladder, there was an area of erythema of the wall  Retroflexion for evaluation of the bladder neck was normal       Overall this was a   Cystoscopy with a sessile area of erythema in the posterior wall, likely consistent with catheter trauma  A female office staff member was present throughout the entire procedure  18 Hebrew  Latex Bergeron placed with 30 cc of sterile water into the balloon  Clear urine to gravity drainage

## 2020-01-10 NOTE — PROGRESS NOTES
UROLOGY PROCEDURE NOTE     CHIEF COMPLAINT   Aurelia Lennox is a 80 y o  female with a complaint of No chief complaint on file  History of Present Illness:     80 y o  female   With a history of a spinal cord injury at age 6  She has had some urinary retention and for sauce in 2017  She has been catheter dependent since that time  She is followed with us on a yearly basis  Given her age, we opted to avoid regular cystourethroscopy  She presented in November of 2019 with an ultrasound that demonstrated a question of a bladder lesion  She was offered cystoscopy presents today to undergo      Past Medical History:     Past Medical History:   Diagnosis Date    Accelerated hypertension 1/17/2017    Acute bronchopneumonia 8/3/2019    Acute respiratory failure ([address] ) 8/3/2019    Altered mental status     Depression     Dysphagia, oropharyngeal phase     Hypertension     Multiple thyroid nodules     Pacemaker     Rhabdomyolysis 1/17/2017    Urinary retention     Urinary tract infection 8/18/2017       PAST SURGICAL HISTORY:     Past Surgical History:   Procedure Laterality Date    CARDIAC PACEMAKER PLACEMENT         CURRENT MEDICATIONS:     Current Outpatient Medications   Medication Sig Dispense Refill    acetaminophen (TYLENOL) 500 mg tablet Take 500 mg by mouth every 6 (six) hours as needed for mild pain      acetaminophen (TYLENOL) 650 mg suppository Insert 650 mg into the rectum every 4 (four) hours as needed for mild pain      amLODIPine (NORVASC) 2 5 mg tablet Take 2 5 mg by mouth daily      bisacodyl (DULCOLAX) 10 mg suppository Insert 10 mg into the rectum daily      folic acid (FOLVITE) 1 mg tablet Take 1 mg by mouth daily      furosemide (LASIX) 20 mg tablet Take 1 tablet (20 mg total) by mouth every other day 30 tablet 0    Magnesium Hydroxide (MILK OF MAGNESIA PO) Take 30 mL by mouth daily as needed      metoprolol tartrate (LOPRESSOR) 25 mg tablet Take 12 5 mg by mouth 2 (two) times a day      ondansetron (ZOFRAN) 4 mg tablet Take 4 mg by mouth every 8 (eight) hours as needed for nausea or vomiting      polyethylene glycol (MIRALAX) 17 g packet Take 17 g by mouth daily as needed (Constipation) for up to 30 days 510 g 0    albuterol (2 5 mg/3 mL) 0 083 % nebulizer solution Take by nebulization every 6 (six) hours as needed for wheezing      Calcium Carbonate (CALCIUM 600 PO) Take 600 mg by mouth 2 (two) times a day      cyanocobalamin (VITAMIN B-12) 500 mcg tablet Take 500 mcg by mouth daily      ferrous sulfate 325 (65 Fe) mg tablet Take 325 mg by mouth daily at bedtime      mirtazapine (REMERON) 7 5 MG tablet Take 1 tablet by mouth daily at bedtime (Patient not taking: Reported on 1/10/2020)  0    sertraline (ZOLOFT) 25 mg tablet Take 25 mg by mouth daily       No current facility-administered medications for this visit          ALLERGIES:   No Known Allergies    SOCIAL HISTORY:     Social History     Socioeconomic History    Marital status: Single     Spouse name: None    Number of children: None    Years of education: None    Highest education level: None   Occupational History    None   Social Needs    Financial resource strain: None    Food insecurity:     Worry: None     Inability: None    Transportation needs:     Medical: None     Non-medical: None   Tobacco Use    Smoking status: Never Smoker    Smokeless tobacco: Never Used   Substance and Sexual Activity    Alcohol use: Never     Frequency: Never    Drug use: No    Sexual activity: Not Currently   Lifestyle    Physical activity:     Days per week: None     Minutes per session: None    Stress: None   Relationships    Social connections:     Talks on phone: None     Gets together: None     Attends Hoahaoism service: None     Active member of club or organization: None     Attends meetings of clubs or organizations: None     Relationship status: None    Intimate partner violence:     Fear of current or ex partner: None     Emotionally abused: None     Physically abused: None     Forced sexual activity: None   Other Topics Concern    None   Social History Narrative    None       SOCIAL HISTORY:     Family History   Problem Relation Age of Onset    No Known Problems Mother     No Known Problems Father        REVIEW OF SYSTEMS:     Review of Systems   Constitutional: Positive for activity change  Respiratory: Negative  Cardiovascular: Positive for leg swelling  Gastrointestinal: Negative  Genitourinary: Positive for difficulty urinating  Musculoskeletal: Positive for back pain and gait problem  Skin: Negative  Psychiatric/Behavioral: Negative  PHYSICAL EXAM:     /62 (BP Location: Right arm, Patient Position: Sitting, Cuff Size: Adult)   Pulse 92     General:    Frail elderly female in no acute distress  They have a normal affect  There is not appear to be any gross neurologic defects or abnormalities  HEENT:  Normocephalic, atraumatic  Neck is supple without any palpable lymphadenopathy  Cardiovascular:  Patient has normal palpable distal radial pulses  There is no significant peripheral edema  No JVD is noted  Respiratory:  Patient has unlabored respirations  There is no audible wheeze or rhonchi  Abdomen:   Abdomen is soft and nontender  There is no tympany  Inguinal and umbilical hernia are not appreciated  Genitourinary:  Atrophic external female genitalia, catheter in place    Musculoskeletal:  Patient does not have significant CVA tenderness in the  flank with palpation or percussion  They full range of motion in all 4 extremities  Strength in all 4 extremities appears congruent  Patient is able to ambulate without assistance or difficulty  Dermatologic:  Patient has no skin abnormalities or rashes        LABS:     CBC:   Lab Results   Component Value Date    WBC 7 86 08/06/2019    HGB 10 4 (L) 08/06/2019    HCT 32 2 (L) 08/06/2019     (H) 08/06/2019  2019       BMP:   Lab Results   Component Value Date    GLUCOSE 103 2017    CALCIUM 8 5 2019    K 3 8 2019    CO2 33 (H) 2019     2019    BUN 9 2019    CREATININE 0 62 2019       IMAGIN/2019      PROCEDURE:     SEE NOTE    ASSESSMENT:     80 y o  female with abnormal bladder finding hydronephrosis    PLAN:      there is an area of erythema and likely catheter trauma in the posterior bladder  This is not a large papillary tumor  In order to further define pathology, we would have to put the patient under anesthesia and perform a transurethral resection of her bladder  At age 80, this was discussed with the patient and she is not inclined to proceed  At this point time, she makes her own medical decisions  We agreed to move forward with repeat ultrasound imaging in 6 months  Patient should have regular catheter changes every 4-6 weeks

## 2020-01-20 ENCOUNTER — REMOTE DEVICE CLINIC VISIT (OUTPATIENT)
Dept: CARDIOLOGY CLINIC | Facility: CLINIC | Age: 85
End: 2020-01-20
Payer: MEDICARE

## 2020-01-20 DIAGNOSIS — Z95.0 CARDIAC PACEMAKER IN SITU: Primary | ICD-10-CM

## 2020-01-20 PROCEDURE — 93296 REM INTERROG EVL PM/IDS: CPT | Performed by: INTERNAL MEDICINE

## 2020-01-20 PROCEDURE — 93294 REM INTERROG EVL PM/LDLS PM: CPT | Performed by: INTERNAL MEDICINE

## 2020-01-20 NOTE — PROGRESS NOTES
Results for orders placed or performed in visit on 01/20/20   Cardiac EP device report    Narrative    MDT DDD PPM/ ACTIVE SYSTEM IS MRI CONDITIONAL  CARELINK TRANSMISSION: BATTERY VOLTAGE ADEQUATE (7 5 YRS)  AP-61%, <0 1%  ALL AVAILABLE LEAD PARAMETERS WITHIN NORMAL LIMITS  NO SIGNIFICANT HIGH RATE EPISODES  NORMAL DEVICE FUNCTION   GV

## 2020-01-27 ENCOUNTER — NURSING HOME VISIT (OUTPATIENT)
Dept: GERIATRICS | Facility: OTHER | Age: 85
End: 2020-01-27
Payer: MEDICARE

## 2020-01-27 DIAGNOSIS — I10 ESSENTIAL HYPERTENSION: ICD-10-CM

## 2020-01-27 DIAGNOSIS — I50.32 CHRONIC DIASTOLIC CONGESTIVE HEART FAILURE (HCC): Primary | ICD-10-CM

## 2020-01-27 DIAGNOSIS — G30.1 LATE ONSET ALZHEIMER'S DISEASE WITHOUT BEHAVIORAL DISTURBANCE (HCC): ICD-10-CM

## 2020-01-27 DIAGNOSIS — E87.1 HYPONATREMIA: ICD-10-CM

## 2020-01-27 DIAGNOSIS — R33.9 URINARY RETENTION: ICD-10-CM

## 2020-01-27 DIAGNOSIS — F02.80 LATE ONSET ALZHEIMER'S DISEASE WITHOUT BEHAVIORAL DISTURBANCE (HCC): ICD-10-CM

## 2020-01-27 PROBLEM — H61.21 IMPACTED CERUMEN OF RIGHT EAR: Status: RESOLVED | Noted: 2019-09-16 | Resolved: 2020-01-27

## 2020-01-27 PROBLEM — G93.40 ACUTE ENCEPHALOPATHY: Status: RESOLVED | Noted: 2017-08-18 | Resolved: 2020-01-27

## 2020-01-27 PROBLEM — A41.9 SEPSIS (HCC): Status: RESOLVED | Noted: 2019-08-03 | Resolved: 2020-01-27

## 2020-01-27 PROCEDURE — 99309 SBSQ NF CARE MODERATE MDM 30: CPT | Performed by: FAMILY MEDICINE

## 2020-01-27 NOTE — PROGRESS NOTES
[facility]  [address]  [phone]  Facility: [place] [address]          NAME: Laurita Parsons  AGE: 80 y o  SEX: female    DATE OF ENCOUNTER: 1/27/2020    Chief Complaint     Pt has no complaint    History of Present Illness     HPI    The following portions of the patient's history were reviewed and updated as appropriate (from facility chart and hospital records): allergies, current medications, past family history, past medical history, past social history, past surgical history and problem list  Pt was seen and examined for f/u on Hyponatremia, CHF, Dementia,HTN  Pt is on Na tabs now due to low Na level on 1/15/2020  BP has been stable, no sign or symptoms of CHF exacerbation  Pt is tearful today because her roommate passed away  continues with NH care  Walks with restorative care otherwise moves with her WC  No behaviors  Pt had refused bladder biopsy  No hematuria, no issues with gray cath  No sign or symptoms of CHF exacerbation  Review of Systems     Review of Systems   Constitutional: Negative  HENT: Negative  Eyes: Negative  Respiratory: Negative  Cardiovascular: Negative  Gastrointestinal: Negative  Endocrine: Negative  Genitourinary: Negative  Musculoskeletal: Negative  Skin: Negative  Allergic/Immunologic: Negative  Neurological: Negative  Hematological: Negative  Psychiatric/Behavioral: Negative  All other systems reviewed and are negative        Active Problem List     Patient Active Problem List   Diagnosis    Elevated troponin    Status post placement of cardiac pacemaker    Urinary retention    Pacemaker    Hypertension    Depression    Late onset Alzheimer's disease without behavioral disturbance (HCC)    Elevated TSH    Cough    Hyponatremia    Other constipation    Abnormal urinalysis    Chronic diastolic congestive heart failure (HCC)    Chronic left shoulder pain       Objective     Vitals: WT: 146 4Ibs   BP:120/60   RR:18  HR:78    Physical Exam   Constitutional: She appears well-developed and well-nourished  No distress  HENT:   Head: Normocephalic and atraumatic  Right Ear: External ear normal    Left Ear: External ear normal    Nose: Nose normal    Mouth/Throat: Oropharynx is clear and moist  No oropharyngeal exudate  Eyes: Pupils are equal, round, and reactive to light  Conjunctivae and EOM are normal  Right eye exhibits no discharge  Left eye exhibits no discharge  No scleral icterus  Neck: Normal range of motion  Neck supple  Cardiovascular: Normal rate, regular rhythm and normal heart sounds  Exam reveals no gallop and no friction rub  No murmur heard  L ACW pacer in place   Pulmonary/Chest: Effort normal and breath sounds normal  No stridor  No respiratory distress  She has no wheezes  She has no rales  She exhibits no tenderness  Abdominal: Soft  Bowel sounds are normal  She exhibits no distension and no mass  There is no tenderness  There is no rebound and no guarding  No hernia  Bergeron in place   Genitourinary:   Genitourinary Comments: Deferred  Musculoskeletal: Normal range of motion  She exhibits edema (+1 edema)  She exhibits no tenderness or deformity  In a chair sitting, limited ROM, no asymmetry  Lymphadenopathy:     She has no cervical adenopathy  Neurological: A cranial nerve deficit is present  Skin: Skin is warm and dry  No rash noted  She is not diaphoretic  There is erythema  No pallor  Didn't examine sacral area  Psychiatric: She has a normal mood and affect  Her behavior is normal    Nursing note and vitals reviewed  Pertinent Laboratory/Diagnostic Studies:  CMP, done on 1/15/2020,   CBC on 1/8/2020      Current Medications   Medication list in facility chart was reviewed and no changes made  Assessment and Plan     Chronic diastolic congestive heart failure ([address] )  Wt has been stable, on Lasix, will continue with monitoring       Late onset Alzheimer's disease without behavioral disturbance  No behaviors, continues with NH care  Stable  Hyponatremia  On NA tabs, will check labs in 2 days  Urinary retention  2nd to neurogenic bladder, pt has refused biopsy of bladder lesion  Stable at this time  Hypertension  BP has been stable with Amlodipine and Metoprolol, will continue with monitoring  Name: Jeannette Ragland  : 1929  MRN: [id number]  DOS: 2020  BILLING CODE: [id number]  Diagnoses:   Diagnosis ICD-10-CM Associated Orders   1  Chronic diastolic congestive heart failure (HCC) I50 32    2  Late onset Alzheimer's disease without behavioral disturbance (HCC) G30 1     F02 80    3  Hyponatremia E87 1    4  Urinary retention R33 9    5   Essential hypertension Christy Salazar MD  /[id number]:28 PM

## 2020-02-24 ENCOUNTER — NURSING HOME VISIT (OUTPATIENT)
Dept: GERIATRICS | Facility: OTHER | Age: 85
End: 2020-02-24
Payer: MEDICARE

## 2020-02-24 DIAGNOSIS — R33.9 URINARY RETENTION: ICD-10-CM

## 2020-02-24 DIAGNOSIS — I50.32 CHRONIC DIASTOLIC CONGESTIVE HEART FAILURE (HCC): ICD-10-CM

## 2020-02-24 DIAGNOSIS — G30.1 LATE ONSET ALZHEIMER'S DISEASE WITHOUT BEHAVIORAL DISTURBANCE (HCC): Primary | ICD-10-CM

## 2020-02-24 DIAGNOSIS — I10 ESSENTIAL HYPERTENSION: ICD-10-CM

## 2020-02-24 DIAGNOSIS — F02.80 LATE ONSET ALZHEIMER'S DISEASE WITHOUT BEHAVIORAL DISTURBANCE (HCC): Primary | ICD-10-CM

## 2020-02-24 DIAGNOSIS — E87.1 HYPONATREMIA: ICD-10-CM

## 2020-02-24 PROCEDURE — 99309 SBSQ NF CARE MODERATE MDM 30: CPT | Performed by: FAMILY MEDICINE

## 2020-02-24 NOTE — PROGRESS NOTES
[facility]  [address]  [phone]  Facility: [place] [address]          NAME: Ashia Murdock  AGE: 80 y o  SEX: female    DATE OF ENCOUNTER: 2/24/2020    Chief Complaint     Pt has no complaint     History of Present Illness     HPI    The following portions of the patient's history were reviewed and updated as appropriate (from facility chart and hospital records): allergies, current medications, past family history, past medical history, past social history, past surgical history and problem list   Pt was seen and examined for f/u on Dementia, CHF, Hyponatremia, HTN, and urinary retention  Pt continues with NH care, wt has been up and down but she is more gaining wt, walks with staff otherwise on WC  BP has been stable, last lab on 1/29/2020, with improving Na level and slight renal function decline  No issues with gray cath  No other specific issues or concerns  No Behaviors  No sign or symptoms of CHF exacerbation  Review of Systems     Review of Systems   Constitutional: Negative  HENT: Negative  Eyes: Negative  Respiratory: Negative  Cardiovascular: Negative  Gastrointestinal: Negative  Endocrine: Negative  Genitourinary: Negative  Musculoskeletal: Negative  Skin: Negative  Allergic/Immunologic: Negative  Neurological: Negative  Hematological: Negative  Psychiatric/Behavioral: Negative          "last week I thought I was depressed but not anymore"   All other systems reviewed and are negative        Active Problem List     Patient Active Problem List   Diagnosis    Elevated troponin    Status post placement of cardiac pacemaker    Urinary retention    Pacemaker    Hypertension    Depression    Late onset Alzheimer's disease without behavioral disturbance (HCC)    Elevated TSH    Cough    Hyponatremia    Other constipation    Abnormal urinalysis    Chronic diastolic congestive heart failure (HCC)    Chronic left shoulder pain       Objective     Vitals:wt:148  2Ibs   BP:130/76   NV:70  RR:16    Physical Exam   Constitutional: She appears well-developed and well-nourished  No distress  HENT:   Head: Normocephalic and atraumatic  Right Ear: External ear normal    Left Ear: External ear normal    Nose: Nose normal    Mouth/Throat: Oropharynx is clear and moist  No oropharyngeal exudate  [place]   Eyes: Pupils are equal, round, and reactive to light  Conjunctivae and EOM are normal  Right eye exhibits no discharge  Left eye exhibits no discharge  No scleral icterus  Neck: Normal range of motion  Neck supple  Cardiovascular: Normal rate, regular rhythm and normal heart sounds  Exam reveals no gallop and no friction rub  No murmur heard  Pulmonary/Chest: Effort normal and breath sounds normal  No stridor  No respiratory distress  She has no wheezes  She has no rales  She exhibits no tenderness  Abdominal: Soft  Bowel sounds are normal  She exhibits no distension and no mass  There is no tenderness  There is no rebound and no guarding  No hernia  Bergeron bag to floor  Genitourinary:   Genitourinary Comments: Deferred  Musculoskeletal: She exhibits no edema (trace edema of LEs), tenderness or deformity  In wc, sitting, limited ROM of extremities  Lymphadenopathy:     She has no cervical adenopathy  Neurological: A cranial nerve deficit is present  [place], forgetful  Skin: Skin is warm and dry  No rash noted  She is not diaphoretic  No erythema  No pallor  Didn't examine sacral area  Psychiatric: She has a normal mood and affect  Her behavior is normal    Nursing note and vitals reviewed  Pertinent Laboratory/Diagnostic Studies:  BMP on 1/29  , CBC on 1/8/2020     Current Medications   Medication list in facility chart was reviewed and no changes made  Assessment and Plan   Late onset Alzheimer's disease without behavioral disturbance  No behaviors, needs NH care  Stable at her base  Urinary retention  2nd to neurogenic bladder, gray in place, no issues, pt has refused bladder biopsy  Hypertension  BP is stable with Metoprolol, and Amlodipine  Hyponatremia  Stable Na level in last lab  On Na tabs, will continue with monitoring  Chronic diastolic congestive heart failure (HCC)  Stable and asymptomatic, on lasix, will continue with monitoring  Name: Jeannette Ragland  : 1929  MRN: [id number]  DOS: 2020  BILLING CODE: [id number]  Diagnoses:   Diagnosis ICD-10-CM Associated Orders   1  Late onset Alzheimer's disease without behavioral disturbance (HCC) G30 1     F02 80    2  Urinary retention R33 9    3  Essential hypertension I10    4  Hyponatremia E87 1    5   Chronic diastolic congestive heart failure (HCC) I50 32          Amy Clay MD  [id number] PM

## 2020-04-06 ENCOUNTER — NURSING HOME VISIT (OUTPATIENT)
Dept: GERIATRICS | Facility: OTHER | Age: 85
End: 2020-04-06
Payer: MEDICARE

## 2020-04-06 DIAGNOSIS — I50.32 CHRONIC DIASTOLIC CONGESTIVE HEART FAILURE (HCC): Primary | ICD-10-CM

## 2020-04-06 DIAGNOSIS — G30.1 LATE ONSET ALZHEIMER'S DISEASE WITHOUT BEHAVIORAL DISTURBANCE (HCC): ICD-10-CM

## 2020-04-06 DIAGNOSIS — F02.80 LATE ONSET ALZHEIMER'S DISEASE WITHOUT BEHAVIORAL DISTURBANCE (HCC): ICD-10-CM

## 2020-04-06 DIAGNOSIS — E87.1 HYPONATREMIA: ICD-10-CM

## 2020-04-06 DIAGNOSIS — I10 ESSENTIAL HYPERTENSION: ICD-10-CM

## 2020-04-06 DIAGNOSIS — R33.9 URINARY RETENTION: ICD-10-CM

## 2020-04-06 PROCEDURE — 99309 SBSQ NF CARE MODERATE MDM 30: CPT | Performed by: FAMILY MEDICINE

## 2020-04-21 ENCOUNTER — REMOTE DEVICE CLINIC VISIT (OUTPATIENT)
Dept: CARDIOLOGY CLINIC | Facility: CLINIC | Age: 85
End: 2020-04-21
Payer: MEDICARE

## 2020-04-21 DIAGNOSIS — Z95.0 PRESENCE OF PERMANENT CARDIAC PACEMAKER: Primary | ICD-10-CM

## 2020-04-21 PROCEDURE — 93294 REM INTERROG EVL PM/LDLS PM: CPT | Performed by: INTERNAL MEDICINE

## 2020-04-21 PROCEDURE — 93296 REM INTERROG EVL PM/IDS: CPT | Performed by: INTERNAL MEDICINE

## 2020-05-06 ENCOUNTER — NURSING HOME VISIT (OUTPATIENT)
Dept: GERIATRICS | Facility: OTHER | Age: 85
End: 2020-05-06
Payer: MEDICARE

## 2020-05-06 DIAGNOSIS — F02.80 LATE ONSET ALZHEIMER'S DISEASE WITHOUT BEHAVIORAL DISTURBANCE (HCC): ICD-10-CM

## 2020-05-06 DIAGNOSIS — R33.9 URINARY RETENTION: ICD-10-CM

## 2020-05-06 DIAGNOSIS — I50.32 CHRONIC DIASTOLIC CONGESTIVE HEART FAILURE (HCC): ICD-10-CM

## 2020-05-06 DIAGNOSIS — E87.1 HYPONATREMIA: ICD-10-CM

## 2020-05-06 DIAGNOSIS — G30.1 LATE ONSET ALZHEIMER'S DISEASE WITHOUT BEHAVIORAL DISTURBANCE (HCC): ICD-10-CM

## 2020-05-06 DIAGNOSIS — I10 ESSENTIAL HYPERTENSION: Primary | ICD-10-CM

## 2020-05-06 PROCEDURE — 99309 SBSQ NF CARE MODERATE MDM 30: CPT | Performed by: FAMILY MEDICINE

## 2020-05-27 ENCOUNTER — NURSING HOME VISIT (OUTPATIENT)
Dept: GERIATRICS | Facility: OTHER | Age: 85
End: 2020-05-27
Payer: MEDICARE

## 2020-05-27 DIAGNOSIS — R53.81 MALAISE: Primary | ICD-10-CM

## 2020-05-27 PROCEDURE — 99308 SBSQ NF CARE LOW MDM 20: CPT | Performed by: FAMILY MEDICINE

## 2020-06-08 ENCOUNTER — NURSING HOME VISIT (OUTPATIENT)
Dept: GERIATRICS | Facility: OTHER | Age: 85
End: 2020-06-08
Payer: MEDICARE

## 2020-06-08 DIAGNOSIS — E87.1 HYPONATREMIA: Primary | ICD-10-CM

## 2020-06-08 DIAGNOSIS — R33.9 URINARY RETENTION: ICD-10-CM

## 2020-06-08 DIAGNOSIS — I10 ESSENTIAL HYPERTENSION: ICD-10-CM

## 2020-06-08 DIAGNOSIS — F02.80 LATE ONSET ALZHEIMER'S DISEASE WITHOUT BEHAVIORAL DISTURBANCE (HCC): ICD-10-CM

## 2020-06-08 DIAGNOSIS — I50.32 CHRONIC DIASTOLIC CONGESTIVE HEART FAILURE (HCC): ICD-10-CM

## 2020-06-08 DIAGNOSIS — G30.1 LATE ONSET ALZHEIMER'S DISEASE WITHOUT BEHAVIORAL DISTURBANCE (HCC): ICD-10-CM

## 2020-06-08 PROCEDURE — 99309 SBSQ NF CARE MODERATE MDM 30: CPT | Performed by: FAMILY MEDICINE

## 2020-07-21 ENCOUNTER — REMOTE DEVICE CLINIC VISIT (OUTPATIENT)
Dept: CARDIOLOGY CLINIC | Facility: CLINIC | Age: 85
End: 2020-07-21
Payer: MEDICARE

## 2020-07-21 DIAGNOSIS — Z95.0 CARDIAC PACEMAKER IN SITU: Primary | ICD-10-CM

## 2020-07-21 PROCEDURE — 93294 REM INTERROG EVL PM/LDLS PM: CPT | Performed by: INTERNAL MEDICINE

## 2020-07-21 PROCEDURE — 93296 REM INTERROG EVL PM/IDS: CPT | Performed by: INTERNAL MEDICINE

## 2020-07-21 NOTE — PROGRESS NOTES
Results for orders placed or performed in visit on 07/21/20   Cardiac EP device report    Narrative    MDT DDD PPM/ ACTIVE SYSTEM IS MRI CONDITIONAL  CARELINK TRANSMISSION: BATTERY VOLTAGE ADEQUATE (7 YRS)  AP-50%, -0 1%  ALL AVAILABLE LEAD PARAMETERS WITHIN NORMAL LIMITS  NO SIGNIFICANT HIGH RATE EPISODES  NORMAL DEVICE FUNCTION   GV

## 2020-08-03 ENCOUNTER — NURSING HOME VISIT (OUTPATIENT)
Dept: GERIATRICS | Facility: OTHER | Age: 85
End: 2020-08-03
Payer: MEDICARE

## 2020-08-03 DIAGNOSIS — E87.1 HYPONATREMIA: ICD-10-CM

## 2020-08-03 DIAGNOSIS — I50.32 CHRONIC DIASTOLIC CONGESTIVE HEART FAILURE (HCC): ICD-10-CM

## 2020-08-03 DIAGNOSIS — I10 ESSENTIAL HYPERTENSION: Primary | ICD-10-CM

## 2020-08-03 DIAGNOSIS — F02.80 LATE ONSET ALZHEIMER'S DISEASE WITHOUT BEHAVIORAL DISTURBANCE (HCC): ICD-10-CM

## 2020-08-03 DIAGNOSIS — G30.1 LATE ONSET ALZHEIMER'S DISEASE WITHOUT BEHAVIORAL DISTURBANCE (HCC): ICD-10-CM

## 2020-08-03 DIAGNOSIS — R33.9 URINARY RETENTION: ICD-10-CM

## 2020-08-03 PROCEDURE — 99309 SBSQ NF CARE MODERATE MDM 30: CPT | Performed by: FAMILY MEDICINE

## 2020-08-03 NOTE — PROGRESS NOTES
[address] [facility]  [address]  [phone]  Facility: [place] [address]          NAME: Tavon Estrada  AGE: 80 y o  SEX: female    DATE OF ENCOUNTER: 8/3/2020    Chief Complaint     Pt has no specific complaint     History of Present Illness     HPI    The following portions of the patient's history were reviewed and updated as appropriate (from facility chart and hospital records): allergies, current medications, past family history, past medical history, past social history, past surgical history and problem list   Pt was seen and examined for f/u on hyponatremia, HTN, CHF,urinary retention,  and dementia  Wt has been stable, last BMP done in July with stable Na level  Continues with NH care, moves using her WC but also walks with staff on restorative care  No behaviors  Pt has no specific issue, maintaining wt  Overall stable  BP has been stable  no CHF exacerbation  No issues with gray  Review of Systems     Review of Systems   Reason unable to perform ROS: limited due to dementia but pt denies any pain or having any complaint        Active Problem List     Patient Active Problem List   Diagnosis    Elevated troponin    Status post placement of cardiac pacemaker    Urinary retention    Pacemaker    Hypertension    Depression    Late onset Alzheimer's disease without behavioral disturbance (HCC)    Elevated TSH    Cough    Hyponatremia    Other constipation    Abnormal urinalysis    Chronic diastolic congestive heart failure (HCC)    Chronic left shoulder pain    Malaise       Objective     Vitals:wt:143Ibs              BP:126/60           Afebrile                    Physical Exam   Constitutional: She is oriented to person, place, and time  She appears well-developed  No distress    " a week ago I was screaming in sleep but I can't remember my dream!"   HENT:   Head: Normocephalic and atraumatic     Right Ear: External ear normal    Left Ear: External ear normal  Nose: Nose normal    Mouth/Throat: No oropharyngeal exudate  Eyes: Pupils are equal, round, and reactive to light  Conjunctivae are normal  Right eye exhibits no discharge  Left eye exhibits no discharge  No scleral icterus  Neck: Normal range of motion  Neck supple  Cardiovascular: Normal rate, regular rhythm and normal heart sounds  Exam reveals no gallop and no friction rub  No murmur heard  L ACW pacer in place    Pulmonary/Chest: Effort normal and breath sounds normal  No stridor  No respiratory distress  She has no wheezes  She has no rhonchi  She has no rales  She exhibits no tenderness  Abdominal: Soft  Bowel sounds are normal  She exhibits no distension and no mass  There is no abdominal tenderness  There is no rebound and no guarding  No hernia  Bergeron bag in place  Genitourinary:    Genitourinary Comments: Deferred  Musculoskeletal: Normal range of motion  General: No tenderness or deformity  Left lower leg: Edema (trace LEs edema ) present  Lymphadenopathy:     She has no cervical adenopathy  Neurological: She is oriented to person, place, and time  A cranial nerve deficit ([place]) is present  Forgetful, follows commands    Skin: Skin is warm and dry  No rash noted  She is not diaphoretic  No erythema  No pallor  Didn't examine sacral area  Psychiatric: Her behavior is normal  Mood normal    Nursing note and vitals reviewed  Pertinent Laboratory/Diagnostic Studies:  BMP on 7/1/2020, CBC in May     Current Medications   Medication list in facility chart was reviewed and no changes made  Assessment and Plan     Hypertension  BP has been stable with Metoprolol, and Amlodipine, will continue with monitoring  Chronic diastolic congestive heart failure (HCC)  No exacerbation, on Lasix, will continue with monitoring  Late onset Alzheimer's disease without behavioral disturbance  Wt stable  Continues with NH care       Urinary retention  2nd to neurogenic bladder, no issues with gray, stable  Hyponatremia  Will check labs  On NA tabs, last lab in July stable           Sharif Dugan MD

## 2020-09-04 ENCOUNTER — NURSING HOME VISIT (OUTPATIENT)
Dept: GERIATRICS | Facility: OTHER | Age: 85
End: 2020-09-04
Payer: MEDICARE

## 2020-09-04 ENCOUNTER — OFFICE VISIT (OUTPATIENT)
Dept: UROLOGY | Facility: CLINIC | Age: 85
End: 2020-09-04
Payer: MEDICARE

## 2020-09-04 VITALS — HEART RATE: 78 BPM | SYSTOLIC BLOOD PRESSURE: 124 MMHG | DIASTOLIC BLOOD PRESSURE: 64 MMHG

## 2020-09-04 DIAGNOSIS — F02.80 LATE ONSET ALZHEIMER'S DISEASE WITHOUT BEHAVIORAL DISTURBANCE (HCC): Primary | ICD-10-CM

## 2020-09-04 DIAGNOSIS — G30.1 LATE ONSET ALZHEIMER'S DISEASE WITHOUT BEHAVIORAL DISTURBANCE (HCC): Primary | ICD-10-CM

## 2020-09-04 DIAGNOSIS — I50.32 CHRONIC DIASTOLIC CONGESTIVE HEART FAILURE (HCC): ICD-10-CM

## 2020-09-04 DIAGNOSIS — I10 ESSENTIAL HYPERTENSION: ICD-10-CM

## 2020-09-04 DIAGNOSIS — R33.9 URINARY RETENTION: ICD-10-CM

## 2020-09-04 DIAGNOSIS — E87.1 HYPONATREMIA: ICD-10-CM

## 2020-09-04 DIAGNOSIS — N32.9 LESION OF BLADDER: Primary | ICD-10-CM

## 2020-09-04 PROCEDURE — 99213 OFFICE O/P EST LOW 20 MIN: CPT | Performed by: PHYSICIAN ASSISTANT

## 2020-09-04 PROCEDURE — 99309 SBSQ NF CARE MODERATE MDM 30: CPT | Performed by: FAMILY MEDICINE

## 2020-09-04 NOTE — PROGRESS NOTES
9/4/2020      Chief Complaint   Patient presents with    Follow-up         Assessment and Plan    80 y o  female managed by Dr Jami Ramirez    1  Neurogenic bladder following spinal cord injury  - managed with chronic indwelling Bergeron catheter    2  Bladder lesion  - small bladder lesion seen on previous ultrasound  - cystoscopy January 2020 revealed erythematous area posterior wall thought to be related to catheter irritation, no masses were seen  - was to return today with follow-up ultrasound, not yet done    For ultrasound kidneys and bladder this week, will call with results  If looks okay will resume annual visits with ultrasounds  If any new or persistent bladder lesions will consider surveillance cystoscopies moving forward after discussion of risks/benefits  Patient has no complaints at all today  Her Bergeron catheter was sterilly exchanged by Narendra Garcia RN today in our office for 16 Korean with 10 cc in the balloon, prior to her return to [facility]  History of Present Illness  Vasquez Diaz is a 80 y o  female here for evaluation of follow-up neurogenic bladder history of spinal cord injury  Bergeron catheter dependent since 2017 November 2019 and ultrasound demonstrated a question of a bladder lesion, cystoscopy performed January 2020 revealing a sessile area of erythema in the posterior wall consistent with catheter trauma  She was to follow up today with follow-up ultrasound, has not yet had this done  She denies any issues with the Bergeron catheter, she has not had hematuria or UTIs  She is unsure when her catheter was last exchanged, however presents today with catheter supplies thus we will exchange at the bedside today  Review of Systems   Constitutional: Negative for activity change, appetite change, chills, fever and unexpected weight change  HENT: Negative  Respiratory: Negative  Negative for shortness of breath  Cardiovascular: Negative  Negative for chest pain  Gastrointestinal: Negative for abdominal pain, diarrhea, nausea and vomiting  Endocrine: Negative  Genitourinary: Positive for difficulty urinating (Bergeron dependent)  Negative for decreased urine volume, dysuria, flank pain, frequency, hematuria and urgency  Musculoskeletal: Negative for back pain and gait problem  Skin: Negative  Allergic/Immunologic: Negative  Neurological: Negative  Hematological: Negative for adenopathy  Does not bruise/bleed easily                  Past Medical History  Past Medical History:   Diagnosis Date    Accelerated hypertension 1/17/2017    Acute bronchopneumonia 8/3/2019    Acute encephalopathy 8/18/2017    Acute respiratory failure ([facility] [address] ) 8/3/2019    Altered mental status     Depression     Dysphagia, oropharyngeal phase     Hypertension     Impacted cerumen of right ear 9/16/2019    Multiple thyroid nodules     Pacemaker     Rhabdomyolysis 1/17/2017    Sepsis ([facility] [address] ) 8/3/2019    Urinary retention     Urinary tract infection 8/18/2017     Past Social History  Past Surgical History:   Procedure Laterality Date    CARDIAC PACEMAKER PLACEMENT       Social History     Tobacco Use   Smoking Status Never Smoker   Smokeless Tobacco Never Used     Past Family History  Family History   Problem Relation Age of Onset    No Known Problems Mother     No Known Problems Father      Past Social history  Social History     Socioeconomic History    Marital status: Single     Spouse name: Not on file    Number of children: Not on file    Years of education: Not on file    Highest education level: Not on file   Occupational History    Not on file   Social Needs    Financial resource strain: Not on file    Food insecurity     Worry: Not on file     Inability: Not on file    Transportation needs     Medical: Not on file     Non-medical: Not on file   Tobacco Use    Smoking status: Never Smoker    Smokeless tobacco: Never Used   Substance and Sexual Activity    Alcohol use: Never     Frequency: Never    Drug use: No    Sexual activity: Not Currently   Lifestyle    Physical activity     Days per week: Not on file     Minutes per session: Not on file    Stress: Not on file   Relationships    Social connections     Talks on phone: Not on file     Gets together: Not on file     Attends Anabaptism service: Not on file     Active member of club or organization: Not on file     Attends meetings of clubs or organizations: Not on file     Relationship status: Not on file    Intimate partner violence     Fear of current or ex partner: Not on file     Emotionally abused: Not on file     Physically abused: Not on file     Forced sexual activity: Not on file   Other Topics Concern    Not on file   Social History Narrative    Not on file     Current Medications  Current Outpatient Medications   Medication Sig Dispense Refill    acetaminophen (TYLENOL) 500 mg tablet Take 500 mg by mouth every 6 (six) hours as needed for mild pain      acetaminophen (TYLENOL) 650 mg suppository Insert 650 mg into the rectum every 4 (four) hours as needed for mild pain      albuterol (2 5 mg/3 mL) 0 083 % nebulizer solution Take by nebulization every 6 (six) hours as needed for wheezing      amLODIPine (NORVASC) 2 5 mg tablet Take 2 5 mg by mouth daily      bisacodyl (DULCOLAX) 10 mg suppository Insert 10 mg into the rectum daily      Calcium Carbonate (CALCIUM 600 PO) Take 600 mg by mouth 2 (two) times a day      cyanocobalamin (VITAMIN B-12) 500 mcg tablet Take 500 mcg by mouth daily      ferrous sulfate 325 (65 Fe) mg tablet Take 325 mg by mouth daily at bedtime      folic acid (FOLVITE) 1 mg tablet Take 1 mg by mouth daily      furosemide (LASIX) 20 mg tablet Take 1 tablet (20 mg total) by mouth every other day 30 tablet 0    Magnesium Hydroxide (MILK OF MAGNESIA PO) Take 30 mL by mouth daily as needed      metoprolol tartrate (LOPRESSOR) 25 mg tablet Take 12 5 mg by mouth 2 (two) times a day      mirtazapine (REMERON) 7 5 MG tablet Take 1 tablet by mouth daily at bedtime  0    ondansetron (ZOFRAN) 4 mg tablet Take 4 mg by mouth every 8 (eight) hours as needed for nausea or vomiting      sertraline (ZOLOFT) 25 mg tablet Take 25 mg by mouth daily      polyethylene glycol (MIRALAX) 17 g packet Take 17 g by mouth daily as needed (Constipation) for up to 30 days 510 g 0     No current facility-administered medications for this visit  Allergies  No Known Allergies      The following portions of the patient's history were reviewed and updated as appropriate: allergies, current medications, past medical history, past social history, past surgical history and problem list       Vitals  Vitals:    09/04/20 0855   BP: 124/64   BP Location: Left arm   Patient Position: Sitting   Cuff Size: Adult   Pulse: 78       Physical Exam  Vitals signs and nursing note reviewed  Constitutional:       General: She is not in acute distress  Appearance: Normal appearance  She is well-developed  She is not diaphoretic  Comments: Elderly white female seated upright on stretcher, pleasant, talkative   HENT:      Head: Normocephalic and atraumatic  Pulmonary:      Effort: Pulmonary effort is normal    Abdominal:      Tenderness: There is no abdominal tenderness  Genitourinary:     Comments: Bergeron catheter draining clear yellow urine  Musculoskeletal:      Right lower leg: No edema  Left lower leg: No edema  Skin:     General: Skin is warm and dry  Neurological:      General: No focal deficit present  Mental Status: She is alert and oriented to person, place, and time  Psychiatric:         Mood and Affect: Mood normal          Speech: Speech normal          Behavior: Behavior normal            Results  No results found for this or any previous visit (from the past 1 hour(s))  ]  No results found for: PSA  Lab Results   Component Value Date    GLUCOSE 103 01/17/2017    CALCIUM 8 5 08/06/2019    K 3 8 08/06/2019    CO2 33 (H) 08/06/2019     08/06/2019    BUN 9 08/06/2019    CREATININE 0 62 08/06/2019     Lab Results   Component Value Date    WBC 7 86 08/06/2019    HGB 10 4 (L) 08/06/2019    HCT 32 2 (L) 08/06/2019     (H) 08/06/2019     08/06/2019         Orders  Orders Placed This Encounter   Procedures    US kidney and bladder     Standing Status:   Future     Standing Expiration Date:   9/4/2024     Order Specific Question:   Is a Renal Artery Doppler also being requested in addition to the Kidney/Renal ultrasound ? Answer:    No

## 2020-09-05 NOTE — ASSESSMENT & PLAN NOTE
2nd to neurogenic bladder, no issues with gray, had urology appointment   Will need bladder US in one week per urology request

## 2020-09-05 NOTE — PROGRESS NOTES
[facility]  [address]  [phone]  Facility: [place] [address]          NAME: Damir Cantu  AGE: 80 y o  SEX: female    DATE OF ENCOUNTER: 9/4/2020    Chief Complaint     Pt has no specific complaint     History of Present Illness     HPI    The following portions of the patient's history were reviewed and updated as appropriate (from facility chart and hospital records): allergies, current medications, past family history, past medical history, past social history, past surgical history and problem list   Pt was seen and examined for f/u on CHF, Dementia, HTN, urinary retention , and hyponatremia  Last lab done on 8/5 with low but stable Na level  Continues with NH care  Walks with restorative care otherwise moves using her WC  No CHF exacerbation  No issues with gray bag  Had urology appointment today and is supposed to have bladder US in one week  Overall stable  Wt has been stable  BP has been stable  No behaviors  No other specific issues or concerns  Review of Systems     Review of Systems   Constitutional: Negative  HENT: Negative  Eyes: Negative  Respiratory: Negative  Cardiovascular: Negative  Gastrointestinal: Negative  "I;ve been having loose stools"   Endocrine: Negative  Genitourinary: Negative  Musculoskeletal: Negative  Skin: Negative  Allergic/Immunologic: Negative  Neurological: Negative  Hematological: Negative  Psychiatric/Behavioral: Negative  All other systems reviewed and are negative        Active Problem List     Patient Active Problem List   Diagnosis    Elevated troponin    Status post placement of cardiac pacemaker    Urinary retention    Pacemaker    Hypertension    Depression    Late onset Alzheimer's disease without behavioral disturbance (HCC)    Elevated TSH    Cough    Hyponatremia    Other constipation    Abnormal urinalysis    Chronic diastolic congestive heart failure (HCC)    Chronic left shoulder pain    Malaise       Objective     Vitals: wt:144  6Ibs             BP:130/62       Afebrile     Physical Exam  Vitals signs and nursing note reviewed  Constitutional:       General: She is not in acute distress  Appearance: Normal appearance  She is well-developed  She is not ill-appearing, toxic-appearing or diaphoretic  HENT:      Head: Normocephalic and atraumatic  Right Ear: External ear normal       Left Ear: External ear normal       Ears:      Comments: Circle     Nose: Nose normal  No congestion or rhinorrhea  Mouth/Throat:      Pharynx: No oropharyngeal exudate or posterior oropharyngeal erythema  Eyes:      General: No scleral icterus  Right eye: No discharge  Left eye: No discharge  Conjunctiva/sclera: Conjunctivae normal       Pupils: Pupils are equal, round, and reactive to light  Neck:      Musculoskeletal: Normal range of motion and neck supple  No neck rigidity or muscular tenderness  Cardiovascular:      Rate and Rhythm: Normal rate and regular rhythm  Heart sounds: Normal heart sounds  No murmur  No friction rub  No gallop  Pulmonary:      Effort: Pulmonary effort is normal  No respiratory distress  Breath sounds: Normal breath sounds  No stridor  No wheezing, rhonchi or rales  Chest:      Chest wall: No tenderness  Abdominal:      General: Bowel sounds are normal  There is no distension  Palpations: Abdomen is soft  There is no mass  Tenderness: There is no abdominal tenderness  There is no guarding or rebound  Hernia: No hernia is present  Genitourinary:     Comments: Deferred  Musculoskeletal: Normal range of motion  General: No swelling, tenderness, deformity or signs of injury  Right lower leg: Edema (trace ) present  Left lower leg: Edema (trace ) present  Lymphadenopathy:      Cervical: No cervical adenopathy  Skin:     General: Skin is warm and dry        Coloration: Skin is not jaundiced or pale  Findings: Erythema present  No bruising, lesion or rash  Comments: Didn't examine sacral area  Neurological:      Mental Status: She is alert  Cranial Nerves: Cranial nerve deficit ([place]) present  Comments: Follows commands, forgetful    Psychiatric:         Behavior: Behavior normal          Pertinent Laboratory/Diagnostic Studies:  8/5/2020: CBC, BMP and TSH     Current Medications   Medication list in facility chart was reviewed and no  changes made  Assessment and Plan   Late onset Alzheimer's disease without behavioral disturbance  Wt is stable, will continue with NH care  Hypertension  On Metoprolol,and Amlodipine, will continue with monitoring  Chronic diastolic congestive heart failure (HCC)  No exacerbation, on Lasix, will continue with monitoring  Urinary retention  2nd to neurogenic bladder, no issues with gray, had urology appointment  Will need bladder US in one week per urology request      Hyponatremia  On Na tabs, will continue with monitoring         Ne Triana MD  5/3/65898:75 PM

## 2020-10-05 ENCOUNTER — NURSING HOME VISIT (OUTPATIENT)
Dept: GERIATRICS | Facility: OTHER | Age: 85
End: 2020-10-05
Payer: MEDICARE

## 2020-10-05 DIAGNOSIS — I50.32 CHRONIC DIASTOLIC CONGESTIVE HEART FAILURE (HCC): ICD-10-CM

## 2020-10-05 DIAGNOSIS — F02.80 LATE ONSET ALZHEIMER'S DISEASE WITHOUT BEHAVIORAL DISTURBANCE (HCC): ICD-10-CM

## 2020-10-05 DIAGNOSIS — G30.1 LATE ONSET ALZHEIMER'S DISEASE WITHOUT BEHAVIORAL DISTURBANCE (HCC): ICD-10-CM

## 2020-10-05 DIAGNOSIS — E87.1 HYPONATREMIA: ICD-10-CM

## 2020-10-05 DIAGNOSIS — R33.9 URINARY RETENTION: ICD-10-CM

## 2020-10-05 DIAGNOSIS — I10 ESSENTIAL HYPERTENSION: Primary | ICD-10-CM

## 2020-10-05 PROBLEM — R82.90 ABNORMAL URINALYSIS: Status: RESOLVED | Noted: 2019-08-03 | Resolved: 2020-10-05

## 2020-10-05 PROBLEM — R53.81 MALAISE: Status: RESOLVED | Noted: 2020-05-27 | Resolved: 2020-10-05

## 2020-10-05 PROBLEM — R05.9 COUGH: Status: RESOLVED | Noted: 2019-05-15 | Resolved: 2020-10-05

## 2020-10-05 PROCEDURE — 99309 SBSQ NF CARE MODERATE MDM 30: CPT | Performed by: FAMILY MEDICINE

## 2020-10-14 ENCOUNTER — NURSING HOME VISIT (OUTPATIENT)
Dept: GERIATRICS | Facility: OTHER | Age: 85
End: 2020-10-14
Payer: MEDICARE

## 2020-10-14 DIAGNOSIS — R19.7 DIARRHEA, UNSPECIFIED TYPE: Primary | ICD-10-CM

## 2020-10-14 PROCEDURE — 99308 SBSQ NF CARE LOW MDM 20: CPT | Performed by: FAMILY MEDICINE

## 2020-10-24 ENCOUNTER — TELEPHONE (OUTPATIENT)
Dept: OTHER | Facility: OTHER | Age: 85
End: 2020-10-24

## 2020-10-26 ENCOUNTER — NURSING HOME VISIT (OUTPATIENT)
Dept: GERIATRICS | Facility: OTHER | Age: 85
End: 2020-10-26
Payer: MEDICARE

## 2020-10-26 DIAGNOSIS — E87.1 HYPONATREMIA: ICD-10-CM

## 2020-10-26 DIAGNOSIS — I10 ESSENTIAL HYPERTENSION: Primary | ICD-10-CM

## 2020-10-26 PROCEDURE — 99308 SBSQ NF CARE LOW MDM 20: CPT | Performed by: FAMILY MEDICINE

## 2020-11-09 ENCOUNTER — REMOTE DEVICE CLINIC VISIT (OUTPATIENT)
Dept: CARDIOLOGY CLINIC | Facility: CLINIC | Age: 85
End: 2020-11-09
Payer: MEDICARE

## 2020-11-09 ENCOUNTER — NURSING HOME VISIT (OUTPATIENT)
Dept: GERIATRICS | Facility: OTHER | Age: 85
End: 2020-11-09
Payer: MEDICARE

## 2020-11-09 DIAGNOSIS — I10 ESSENTIAL HYPERTENSION: ICD-10-CM

## 2020-11-09 DIAGNOSIS — R33.9 URINARY RETENTION: ICD-10-CM

## 2020-11-09 DIAGNOSIS — I50.32 CHRONIC DIASTOLIC CONGESTIVE HEART FAILURE (HCC): Primary | ICD-10-CM

## 2020-11-09 DIAGNOSIS — F02.80 LATE ONSET ALZHEIMER'S DISEASE WITHOUT BEHAVIORAL DISTURBANCE (HCC): ICD-10-CM

## 2020-11-09 DIAGNOSIS — Z95.0 PACEMAKER: Primary | ICD-10-CM

## 2020-11-09 DIAGNOSIS — G30.1 LATE ONSET ALZHEIMER'S DISEASE WITHOUT BEHAVIORAL DISTURBANCE (HCC): ICD-10-CM

## 2020-11-09 DIAGNOSIS — E87.1 HYPONATREMIA: ICD-10-CM

## 2020-11-09 PROCEDURE — 93296 REM INTERROG EVL PM/IDS: CPT | Performed by: INTERNAL MEDICINE

## 2020-11-09 PROCEDURE — 99309 SBSQ NF CARE MODERATE MDM 30: CPT | Performed by: FAMILY MEDICINE

## 2020-11-09 PROCEDURE — 93294 REM INTERROG EVL PM/LDLS PM: CPT | Performed by: INTERNAL MEDICINE

## 2020-11-18 RX ORDER — ACETAMINOPHEN 325 MG/1
650 TABLET ORAL EVERY 12 HOURS
COMMUNITY
End: 2021-05-31

## 2020-11-18 RX ORDER — SODIUM CHLORIDE 9 MG/ML
INJECTION, SOLUTION INTRAVENOUS
Status: ON HOLD | COMMUNITY
End: 2021-05-31 | Stop reason: CLARIF

## 2020-11-18 RX ORDER — POLYETHYLENE GLYCOL 1450
POWDER (GRAM) MISCELLANEOUS
COMMUNITY
End: 2021-01-17

## 2020-11-18 RX ORDER — ACETAMINOPHEN 325 MG/1
650 TABLET ORAL EVERY 4 HOURS PRN
COMMUNITY
End: 2021-05-31

## 2020-11-18 RX ORDER — POLYETHYLENE GLYCOL 3350
POWDER (GRAM) MISCELLANEOUS
COMMUNITY
End: 2021-01-17

## 2020-11-18 RX ORDER — ADHESIVE BANDAGE 3/4"
1 BANDAGE TOPICAL
COMMUNITY
End: 2021-06-05 | Stop reason: HOSPADM

## 2020-11-25 ENCOUNTER — NURSING HOME VISIT (OUTPATIENT)
Dept: GERIATRICS | Facility: OTHER | Age: 85
End: 2020-11-25
Payer: MEDICARE

## 2020-11-25 DIAGNOSIS — R60.0 LOCALIZED EDEMA: Primary | ICD-10-CM

## 2020-11-25 PROCEDURE — 99308 SBSQ NF CARE LOW MDM 20: CPT | Performed by: FAMILY MEDICINE

## 2020-12-11 ENCOUNTER — NURSING HOME VISIT (OUTPATIENT)
Dept: GERIATRICS | Facility: OTHER | Age: 85
End: 2020-12-11
Payer: MEDICARE

## 2020-12-11 DIAGNOSIS — I10 ESSENTIAL HYPERTENSION: ICD-10-CM

## 2020-12-11 DIAGNOSIS — E87.1 HYPONATREMIA: ICD-10-CM

## 2020-12-11 DIAGNOSIS — I50.32 CHRONIC DIASTOLIC CONGESTIVE HEART FAILURE (HCC): ICD-10-CM

## 2020-12-11 DIAGNOSIS — R33.9 URINARY RETENTION: ICD-10-CM

## 2020-12-11 DIAGNOSIS — F02.80 LATE ONSET ALZHEIMER'S DISEASE WITHOUT BEHAVIORAL DISTURBANCE (HCC): Primary | ICD-10-CM

## 2020-12-11 DIAGNOSIS — G30.1 LATE ONSET ALZHEIMER'S DISEASE WITHOUT BEHAVIORAL DISTURBANCE (HCC): Primary | ICD-10-CM

## 2020-12-11 PROBLEM — R19.7 DIARRHEA: Status: RESOLVED | Noted: 2020-10-14 | Resolved: 2020-12-11

## 2020-12-11 PROCEDURE — 99309 SBSQ NF CARE MODERATE MDM 30: CPT | Performed by: FAMILY MEDICINE

## 2021-01-18 ENCOUNTER — NURSING HOME VISIT (OUTPATIENT)
Dept: GERIATRICS | Facility: OTHER | Age: 86
End: 2021-01-18
Payer: MEDICARE

## 2021-01-18 DIAGNOSIS — I10 ESSENTIAL HYPERTENSION: ICD-10-CM

## 2021-01-18 DIAGNOSIS — R33.9 URINARY RETENTION: ICD-10-CM

## 2021-01-18 DIAGNOSIS — E87.1 HYPONATREMIA: ICD-10-CM

## 2021-01-18 DIAGNOSIS — G30.1 LATE ONSET ALZHEIMER'S DISEASE WITHOUT BEHAVIORAL DISTURBANCE (HCC): ICD-10-CM

## 2021-01-18 DIAGNOSIS — I50.32 CHRONIC DIASTOLIC CONGESTIVE HEART FAILURE (HCC): Primary | ICD-10-CM

## 2021-01-18 DIAGNOSIS — F02.80 LATE ONSET ALZHEIMER'S DISEASE WITHOUT BEHAVIORAL DISTURBANCE (HCC): ICD-10-CM

## 2021-01-18 PROCEDURE — 99309 SBSQ NF CARE MODERATE MDM 30: CPT | Performed by: FAMILY MEDICINE

## 2021-01-18 NOTE — ASSESSMENT & PLAN NOTE
BP stable with Metoprolol, and Amlodipine  Will continue with monitoring  Ordered labs for 1/20/2021

## 2021-01-18 NOTE — PROGRESS NOTES
[facility]  [address]  [phone]  Facility: [place] [address]          NAME: Miguel Barahona  AGE: 80 y o  SEX: female    DATE OF ENCOUNTER: 1/18/2021    Chief Complaint     Pt has no complaint     History of Present Illness     HPI    The following portions of the patient's history were reviewed and updated as appropriate (from facility chart and hospital records): allergies, current medications, past family history, past medical history, past social history, past surgical history and problem list   Pt was seen and examined for f/u on Dementia, CHF, HTN, urinary retention, Hyponatremia  No issues with gray cath  BP stable  Last Bmp done in November, and stable  No CHF exacerbation  Wt stable  Walks with restorative care otherwise moves using her WC  Pt continues with NH care  No behaviors  Pt is overall stable  Pt had her first dose of Covid-19 on 1/14/2021  Review of Systems     Review of Systems   Constitutional: Negative  HENT: Negative  Eyes: Negative  Respiratory: Negative  Cardiovascular: Negative  Gastrointestinal: Negative          "my bowel movement has been very good!"   Endocrine: Negative  Genitourinary: Negative  Musculoskeletal: Negative  Skin: Negative  Allergic/Immunologic: Negative  Neurological: Negative  Hematological: Negative  Psychiatric/Behavioral: Negative  All other systems reviewed and are negative  Active Problem List     Patient Active Problem List   Diagnosis    Elevated troponin    Status post placement of cardiac pacemaker    Urinary retention    Pacemaker    Hypertension    Depression    Late onset Alzheimer's disease without behavioral disturbance (HCC)    Elevated TSH    Hyponatremia    Other constipation    Chronic diastolic congestive heart failure (HCC)    Chronic left shoulder pain    Localized edema       Objective     Vitals: wt:142  2Ibs       Afebrile       BP:130/60 NV:70   RR:18    Physical Exam  Vitals signs and nursing note reviewed  Constitutional:       General: She is not in acute distress  Appearance: Normal appearance  She is well-developed  She is not ill-appearing, toxic-appearing or diaphoretic  HENT:      Head: Normocephalic and atraumatic  Right Ear: External ear normal       Left Ear: External ear normal       Ears:      Comments: Elem     Nose: Nose normal  No congestion or rhinorrhea  Mouth/Throat:      Comments: Dentures in place   Eyes:      General: No scleral icterus  Right eye: No discharge  Left eye: No discharge  Extraocular Movements: Extraocular movements intact  Conjunctiva/sclera: Conjunctivae normal       Pupils: Pupils are equal, round, and reactive to light  Neck:      Musculoskeletal: Normal range of motion and neck supple  No neck rigidity or muscular tenderness  Cardiovascular:      Rate and Rhythm: Normal rate and regular rhythm  Heart sounds: Normal heart sounds  No murmur  No friction rub  No gallop  Pulmonary:      Effort: Pulmonary effort is normal  No respiratory distress  Breath sounds: Normal breath sounds  No stridor  No wheezing, rhonchi or rales  Chest:      Chest wall: No tenderness  Abdominal:      General: Abdomen is flat  Bowel sounds are normal  There is no distension  Palpations: Abdomen is soft  There is no mass  Tenderness: There is no abdominal tenderness  There is no guarding or rebound  Hernia: No hernia is present  Comments: Bergeron bag to leg,    Genitourinary:     Comments: Deferred  Musculoskeletal:         General: No swelling, tenderness, deformity or signs of injury  Right lower leg: Edema present  Left lower leg: Edema present  Comments: In WC sitting  Limited ROM  Moves all extremities  Lymphadenopathy:      Cervical: No cervical adenopathy  Skin:     General: Skin is warm and dry        Coloration: Skin is not jaundiced or pale  Findings: No bruising, erythema, lesion or rash  Comments: Didn't examine sacral area  Neurological:      Mental Status: She is alert and oriented to person, place, and time  Cranial Nerves: Cranial nerve deficit ([place]) present  Comments: Follows commands  Psychiatric:         Behavior: Behavior normal          Pertinent Laboratory/Diagnostic Studies:  11/18:BMP, October: CBC    Current Medications   Medication list in facility chart was reviewed and no changes made  Assessment and Plan   Chronic diastolic congestive heart failure (HCC)  Wt stable, no exacerbation, on Lasix  Will continue with monitoring  Ordered labs for 1/20/2021  Hypertension  BP stable with Metoprolol, and Amlodipine  Will continue with monitoring  Ordered labs for 1/20/2021  Late onset Alzheimer's disease without behavioral disturbance  Wt stable, no behaviors  Needs NH care  Urinary retention  2nd to neurogenic bladder  No issues with gray cath  Hyponatremia  On Na tabs, stable  Will continue with monitoring         Malissa Brown MD  1/18/2021

## 2021-01-22 ENCOUNTER — NURSING HOME VISIT (OUTPATIENT)
Dept: GERIATRICS | Facility: OTHER | Age: 86
End: 2021-01-22
Payer: MEDICARE

## 2021-01-22 DIAGNOSIS — L72.3 SEBACEOUS CYST: Primary | ICD-10-CM

## 2021-01-22 PROCEDURE — 99308 SBSQ NF CARE LOW MDM 20: CPT | Performed by: FAMILY MEDICINE

## 2021-01-22 NOTE — PROGRESS NOTES
[facility]  [address]  [phone]  Facility: [place] [address]          NAME: Jeromy Eng  AGE: 80 y o  SEX: female    DATE OF ENCOUNTER: 1/22/2021    Chief Complaint     Pt has no complaint     History of Present Illness     HPI    The following portions of the patient's history were reviewed and updated as appropriate (from facility chart and hospital records): allergies, current medications, past family history, past medical history, past social history, past surgical history and problem list     Review of Systems     Review of Systems   Skin:        Pt has no pain  Active Problem List     Patient Active Problem List   Diagnosis    Elevated troponin    Status post placement of cardiac pacemaker    Urinary retention    Pacemaker    Hypertension    Depression    Late onset Alzheimer's disease without behavioral disturbance (HCC)    Elevated TSH    Hyponatremia    Other constipation    Chronic diastolic congestive heart failure (HCC)    Chronic left shoulder pain    Localized edema    Sebaceous cyst       Objective     Vitals: afebrile          Physical Exam  Skin:     Comments: Mid-chest raised nodular skin change with a black dot in center  Manual pressure released small amount of cheesy content  Pertinent Laboratory/Diagnostic Studies:  No lab for this visit     Current Medications   Medication list in facility chart was reviewed and no changes made  Assessment and Plan   Sebaceous cyst  On mid chest  Small amount of debridement was drained, warm compress with monitoring       Malissa Brown MD  5/88/76433:28 PM

## 2021-01-27 ENCOUNTER — NURSING HOME VISIT (OUTPATIENT)
Dept: GERIATRICS | Facility: OTHER | Age: 86
End: 2021-01-27
Payer: MEDICARE

## 2021-01-27 DIAGNOSIS — I10 ESSENTIAL HYPERTENSION: ICD-10-CM

## 2021-01-27 DIAGNOSIS — R40.4 TRANSIENT ALTERATION OF AWARENESS: Primary | ICD-10-CM

## 2021-01-27 PROCEDURE — 99308 SBSQ NF CARE LOW MDM 20: CPT | Performed by: FAMILY MEDICINE

## 2021-01-27 NOTE — PROGRESS NOTES
[facility]  [address]  [phone]  Facility: [facility]/          NAME: Darryle Ghent  AGE: 80 y o  SEX: female    DATE OF ENCOUNTER: 1/27/2021    Chief Complaint     Pt has no specific complaint at this point     History of Present Illness     HPI    The following portions of the patient's history were reviewed and updated as appropriate (from facility chart and hospital records): allergies, current medications, past family history, past medical history, past social history, past surgical history and problem list  Pt was seen and examined per staff report of pt's altered mental status which started this am with pt being confused, hallucinating and accusing staff of trying to kill the kids which were in her bed! Pt denies any hallucination in time visit saying she is feeling better "now"  Pt mentioned being upset with a person named "Emmanuel Ball"! But she couldn't provide more info  Pt denies any pain  No SOB, BERNAL, urine or GI symptoms  Pt had temp of 99 1 yesterday  BP was elevated at 190/90 and repeat at 170/90, extra dose of Amlodipine was given  Review of Systems     Review of Systems   Reason unable to perform ROS: limited with pt's Dementia    Constitutional: Negative  "I'm better now"   HENT: Negative  Eyes: Negative  Respiratory: Negative  Cardiovascular: Negative  Gastrointestinal: Negative  "I was soaked in stool"   Endocrine: Negative  Genitourinary: Negative  Musculoskeletal: Negative  Skin: Negative  Allergic/Immunologic: Negative  Neurological: Negative  Hematological: Negative  Psychiatric/Behavioral: Positive for hallucinations ("Not now!")  All other systems reviewed and are negative        Active Problem List     Patient Active Problem List   Diagnosis    Elevated troponin    Status post placement of cardiac pacemaker    Urinary retention    Pacemaker    Hypertension    Transient alteration of awareness    Depression    Late onset Alzheimer's disease without behavioral disturbance (HCC)    Elevated TSH    Hyponatremia    Other constipation    Chronic diastolic congestive heart failure (HCC)    Chronic left shoulder pain    Localized edema    Sebaceous cyst       Objective     Vitals: temp:98 9           BP:190/90  Repeat:170/90    KY:81   RR:16    Physical Exam  Vitals signs and nursing note reviewed  Constitutional:       General: She is not in acute distress  Appearance: Normal appearance  She is well-developed  She is not ill-appearing, toxic-appearing or diaphoretic  HENT:      Head: Normocephalic and atraumatic  Right Ear: External ear normal       Left Ear: External ear normal       Nose: Nose normal  No congestion or rhinorrhea  Eyes:      General: No scleral icterus  Right eye: No discharge  Left eye: No discharge  Conjunctiva/sclera: Conjunctivae normal       Pupils: Pupils are equal, round, and reactive to light  Neck:      Musculoskeletal: Normal range of motion and neck supple  No neck rigidity or muscular tenderness  Cardiovascular:      Rate and Rhythm: Normal rate and regular rhythm  Heart sounds: Normal heart sounds  No murmur  No friction rub  No gallop  Comments: LACW pacer in place   Pulmonary:      Effort: Pulmonary effort is normal  No respiratory distress  Breath sounds: Normal breath sounds  No stridor  No wheezing, rhonchi or rales  Chest:      Chest wall: No tenderness  Abdominal:      General: Bowel sounds are normal  There is no distension  Palpations: Abdomen is soft  There is no mass  Tenderness: There is no abdominal tenderness  There is no guarding or rebound  Hernia: No hernia is present  Comments: Bergeron bag to leg   Genitourinary:     Comments: Deferred  Musculoskeletal:         General: No swelling, tenderness, deformity or signs of injury        Right lower leg: No edema (trace if any )  Left lower leg: No edema (trace if any )  Comments: In WC, sitting, moves extremities, limited ROM    Lymphadenopathy:      Cervical: No cervical adenopathy  Skin:     General: Skin is warm and dry  Coloration: Skin is not jaundiced or pale  Findings: No bruising, erythema, lesion or rash  Comments: Didn't examine sacral area  Neurological:      Mental Status: She is alert and oriented to person, place, and time  Cranial Nerves: Cranial nerve deficit ([place]) present  Comments: Forgetful, follows commands  Psychiatric:         Behavior: Behavior normal          Pertinent Laboratory/Diagnostic Studies:  No labs for this visit     Current Medications   Medication list in facility chart was reviewed and necessary changes made  Assessment and Plan     Transient alteration of awareness  With visual hallucinations, currently resolved,  partly 2nd to elevated BP, one dose of extra Amlodipine given  Labs for tomorrow, close vitals check  Hypertension  Elevated, high today  Vitals check q shift, on Amlodipine, and Metoprolol  Will consider increasing Amlodipine dose if continues to be elevated         Mary Toro MD  7/56/73072:72 PM

## 2021-01-27 NOTE — ASSESSMENT & PLAN NOTE
With visual hallucinations, currently resolved,  partly 2nd to elevated BP, one dose of extra Amlodipine given  Labs for tomorrow, close vitals check

## 2021-02-09 ENCOUNTER — REMOTE DEVICE CLINIC VISIT (OUTPATIENT)
Dept: CARDIOLOGY CLINIC | Facility: CLINIC | Age: 86
End: 2021-02-09
Payer: MEDICARE

## 2021-02-09 DIAGNOSIS — Z95.0 PRESENCE OF CARDIAC PACEMAKER: Primary | ICD-10-CM

## 2021-02-09 PROCEDURE — 93296 REM INTERROG EVL PM/IDS: CPT | Performed by: INTERNAL MEDICINE

## 2021-02-09 PROCEDURE — 93294 REM INTERROG EVL PM/LDLS PM: CPT | Performed by: INTERNAL MEDICINE

## 2021-02-09 NOTE — PROGRESS NOTES
Results for orders placed or performed in visit on 02/09/21   Cardiac EP device report    Narrative    MDT-DUAL CHAMBER PPM (AAIR-DDDR MODE) / ACTIVE SYSTEM IS MRI CONDITIONAL  CARELINK TRANSMISSION: BATTERY VOLTAGE ADEQUATE (6 5 YRS)  AP: 71%  : <0,1%  ALL AVAILABLE LEAD PARAMETERS WITHIN NORMAL LIMITS  NO SIGNIFICANT HIGH RATE EPISODES  PACEMAKER FUNCTIONING APPROPRIATELY    [address] [place] [address]

## 2021-02-17 ENCOUNTER — NURSING HOME VISIT (OUTPATIENT)
Dept: GERIATRICS | Facility: OTHER | Age: 86
End: 2021-02-17
Payer: MEDICARE

## 2021-02-17 DIAGNOSIS — I50.32 CHRONIC DIASTOLIC CONGESTIVE HEART FAILURE (HCC): ICD-10-CM

## 2021-02-17 DIAGNOSIS — G30.1 LATE ONSET ALZHEIMER'S DISEASE WITHOUT BEHAVIORAL DISTURBANCE (HCC): ICD-10-CM

## 2021-02-17 DIAGNOSIS — R33.9 URINARY RETENTION: ICD-10-CM

## 2021-02-17 DIAGNOSIS — E87.1 HYPONATREMIA: ICD-10-CM

## 2021-02-17 DIAGNOSIS — F02.80 LATE ONSET ALZHEIMER'S DISEASE WITHOUT BEHAVIORAL DISTURBANCE (HCC): ICD-10-CM

## 2021-02-17 DIAGNOSIS — I10 ESSENTIAL HYPERTENSION: Primary | ICD-10-CM

## 2021-02-17 PROCEDURE — 99309 SBSQ NF CARE MODERATE MDM 30: CPT | Performed by: FAMILY MEDICINE

## 2021-02-17 NOTE — PROGRESS NOTES
[facility]  [address]  [phone]  Facility: _[place]/          NAME: Miguel Barahona  AGE: 80 y o  SEX: female    DATE OF ENCOUNTER: 2/17/2021    Chief Complaint     Pt has no specific complaint     History of Present Illness     HPI    The following portions of the patient's history were reviewed and updated as appropriate (from facility chart and hospital records): allergies, current medications, past family history, past medical history, past social history, past surgical history and problem list   Pt was seen and examined for f/u on HTN, CHF, Dementia, urinary retention, Hyponatremia  Pt has been stable  Wt stable  No CHF exacerbation  No issues with gray cath  No behaviors  BP stable  Pt continues with NH care  Moves using her WC, on restorative care, and walks with staff  Last Na level on 2/3 stable  Some wt loss this month  Review of Systems     Review of Systems   Constitutional: Negative  HENT: Negative  Eyes: Negative  Respiratory: Negative  Cardiovascular: Negative  Gastrointestinal: Negative  Endocrine: Negative  Genitourinary: Negative  Musculoskeletal: Negative  Skin: Negative  Allergic/Immunologic: Negative  Neurological: Negative  Hematological: Negative  Psychiatric/Behavioral: Negative  All other systems reviewed and are negative        Active Problem List     Patient Active Problem List   Diagnosis    Elevated troponin    Status post placement of cardiac pacemaker    Urinary retention    Pacemaker    Hypertension    Transient alteration of awareness    Depression    Late onset Alzheimer's disease without behavioral disturbance (HCC)    Elevated TSH    Hyponatremia    Other constipation    Chronic diastolic congestive heart failure (HCC)    Chronic left shoulder pain    Localized edema    Sebaceous cyst       Objective     Vitals: wt:137 6Ibs         BP:140/76    DE:74   Afebrile     Physical Exam  Vitals signs and nursing note reviewed  Constitutional:       General: She is not in acute distress  Appearance: Normal appearance  She is well-developed  She is not ill-appearing, toxic-appearing or diaphoretic  HENT:      Head: Normocephalic and atraumatic  Right Ear: External ear normal       Left Ear: External ear normal       Nose: Nose normal       Mouth/Throat:      Mouth: Mucous membranes are moist       Comments: Dentures in  Eyes:      General: No scleral icterus  Right eye: No discharge  Left eye: No discharge  Conjunctiva/sclera: Conjunctivae normal       Pupils: Pupils are equal, round, and reactive to light  Neck:      Musculoskeletal: Normal range of motion and neck supple  No neck rigidity or muscular tenderness  Cardiovascular:      Rate and Rhythm: Normal rate and regular rhythm  Heart sounds: Normal heart sounds  No murmur  No friction rub  No gallop  Comments: L ACW pacer in place  Pulmonary:      Effort: Pulmonary effort is normal  No respiratory distress  Breath sounds: Normal breath sounds  No stridor  No wheezing, rhonchi or rales  Chest:      Chest wall: No tenderness  Abdominal:      General: Bowel sounds are normal  There is no distension  Palpations: Abdomen is soft  There is no mass  Tenderness: There is no abdominal tenderness  There is no guarding or rebound  Hernia: No hernia is present  Genitourinary:     Comments: Deferred  Musculoskeletal:         General: No swelling, tenderness, deformity or signs of injury  Right lower leg: Edema present  Left lower leg: Edema present  Comments: In WC, sitting ,moves all extremities  Lymphadenopathy:      Cervical: No cervical adenopathy  Skin:     General: Skin is warm and dry  Coloration: Skin is not jaundiced or pale  Findings: No bruising, erythema, lesion or rash  Comments: Didn't examine sacral area      Neurological: Mental Status: She is alert and oriented to person, place, and time  Cranial Nerves: Cranial nerve deficit ([place]) present  Comments: Forgetful  Psychiatric:         Behavior: Behavior normal          Pertinent Laboratory/Diagnostic Studies:  2/3:CMP   1/28:CBC    Current Medications   Medication list in facility chart was reviewed and no changes made  Assessment and Plan     Hypertension  BP stable with Amlodipine and Metoprolol  Last lab on 2/3  Chronic diastolic congestive heart failure (HCC)  Asymptomatic, on Lasix  Stable  Late onset Alzheimer's disease without behavioral disturbance  No behaviors  Stable needs NH Care, wt stable  Urinary retention  2nd to neurogenic bladder,  No issues with gray  Hyponatremia  On NA tabs, last lab done on 2/3 stable       Jess De Anda MD  5/83/54527:88 PM

## 2021-03-15 ENCOUNTER — NURSING HOME VISIT (OUTPATIENT)
Dept: GERIATRICS | Facility: OTHER | Age: 86
End: 2021-03-15
Payer: MEDICARE

## 2021-03-15 DIAGNOSIS — F02.80 LATE ONSET ALZHEIMER'S DISEASE WITHOUT BEHAVIORAL DISTURBANCE (HCC): ICD-10-CM

## 2021-03-15 DIAGNOSIS — E87.1 HYPONATREMIA: ICD-10-CM

## 2021-03-15 DIAGNOSIS — I10 ESSENTIAL HYPERTENSION: ICD-10-CM

## 2021-03-15 DIAGNOSIS — G30.1 LATE ONSET ALZHEIMER'S DISEASE WITHOUT BEHAVIORAL DISTURBANCE (HCC): ICD-10-CM

## 2021-03-15 DIAGNOSIS — R33.9 URINARY RETENTION: ICD-10-CM

## 2021-03-15 DIAGNOSIS — I50.32 CHRONIC DIASTOLIC CONGESTIVE HEART FAILURE (HCC): Primary | ICD-10-CM

## 2021-03-15 PROCEDURE — 99309 SBSQ NF CARE MODERATE MDM 30: CPT | Performed by: FAMILY MEDICINE

## 2021-03-16 NOTE — PROGRESS NOTES
[facility]  [address]  [phone]  Facility: [place] [address]          NAME: Zeeshan Saravia  AGE: 80 y o  SEX: female    DATE OF ENCOUNTER: 3/15/2021    Chief Complaint     Pt has no specific complaint     History of Present Illness     HPI    The following portions of the patient's history were reviewed and updated as appropriate (from facility chart and hospital records): allergies, current medications, past family history, past medical history, past social history, past surgical history and problem list  Pt was seen and examined for f/u on urinary retention, Dementia, HTN, CHF, and hyponatremia  Pt continues with NH care  Walks with restorative care otherwise moves using her WC  Last CMP on 2/3 with stable Na level  No sign or symptoms of CHF exacerbation  BP stable  No specific issues or concerns  Wt has been stable  No behaviors  No issues with gray cath  Review of Systems     Review of Systems   Constitutional: Negative  HENT: Negative  Eyes: Negative  Respiratory: Negative  Cardiovascular: Negative  Gastrointestinal: Negative  Endocrine: Negative  Genitourinary: Negative  Musculoskeletal: Negative  Skin: Negative  Allergic/Immunologic: Negative  Neurological: Negative  Hematological: Negative  Psychiatric/Behavioral: Negative  All other systems reviewed and are negative        Active Problem List     Patient Active Problem List   Diagnosis    Elevated troponin    Status post placement of cardiac pacemaker    Urinary retention    Pacemaker    Hypertension    Depression    Late onset Alzheimer's disease without behavioral disturbance (HCC)    Elevated TSH    Hyponatremia    Other constipation    Chronic diastolic congestive heart failure (HCC)    Chronic left shoulder pain    Localized edema    Sebaceous cyst       Objective     Vitals: wt:145 5Ibs       BP:126/64      NJ:64  Afebrile     Physical Exam  Vitals signs and nursing note reviewed  Constitutional:       General: She is not in acute distress  Appearance: Normal appearance  She is well-developed  She is not ill-appearing, toxic-appearing or diaphoretic  HENT:      Head: Normocephalic and atraumatic  Right Ear: External ear normal       Left Ear: External ear normal       Nose: Nose normal  No congestion or rhinorrhea  Mouth/Throat:      Comments: Dentures   Eyes:      General: No scleral icterus  Right eye: No discharge  Left eye: No discharge  Conjunctiva/sclera: Conjunctivae normal       Pupils: Pupils are equal, round, and reactive to light  Neck:      Musculoskeletal: Normal range of motion and neck supple  No neck rigidity or muscular tenderness  Cardiovascular:      Rate and Rhythm: Normal rate and regular rhythm  Heart sounds: Normal heart sounds  No murmur  No friction rub  No gallop  Comments: Pacer in left anterior chest,   Pulmonary:      Effort: Pulmonary effort is normal  No respiratory distress  Breath sounds: Normal breath sounds  No stridor  No wheezing, rhonchi or rales  Chest:      Chest wall: No tenderness  Abdominal:      General: Abdomen is flat  Bowel sounds are normal  There is no distension  Palpations: Abdomen is soft  There is no mass  Tenderness: There is no abdominal tenderness  There is no guarding or rebound  Hernia: No hernia is present  Comments: Bergeron bag to leg  Genitourinary:     Comments: Deferred  Musculoskeletal:         General: No swelling, tenderness, deformity or signs of injury  Right lower leg: Edema present  Left lower leg: Edema present  Comments: In WC, sitting  Limited ROM of UEs and LEs  Lymphadenopathy:      Cervical: No cervical adenopathy  Skin:     General: Skin is warm and dry  Coloration: Skin is not jaundiced or pale  Findings: No bruising, erythema, lesion or rash        Comments: Didn't examine sacral area  Neurological:      Mental Status: She is alert and oriented to person, place, and time  Cranial Nerves: Cranial nerve deficit present  Comments: Forgetful  Follows simple commands  Psychiatric:         Behavior: Behavior normal          Pertinent Laboratory/Diagnostic Studies:  2/3:CMP,  1/28: CBC     Current Medications   Medication list in facility chart was reviewed and no changes made  Assessment and Plan   Chronic diastolic congestive heart failure (HCC)  No sign or symptoms of CHF exacerbation  On Lasix  Stable  Wt is stable  Late onset Alzheimer's disease without behavioral disturbance  No behaviors  Wt stable  Needs NH care  Urinary retention  2nd to neurogenic bladder  No issues with gray cath  Hyponatremia  Last lab done in February, Na stable  On Na tabs  Hypertension  Stable with Metoprolol, and Amlodipine       Mary Mehta MD  1/75/79436:38 PM

## 2021-03-26 ENCOUNTER — NURSING HOME VISIT (OUTPATIENT)
Dept: GERIATRICS | Facility: OTHER | Age: 86
End: 2021-03-26
Payer: MEDICARE

## 2021-03-26 DIAGNOSIS — H61.21 IMPACTED CERUMEN OF RIGHT EAR: ICD-10-CM

## 2021-03-26 DIAGNOSIS — H00.014 HORDEOLUM EXTERNUM OF LEFT UPPER EYELID: Primary | ICD-10-CM

## 2021-03-26 PROCEDURE — 99308 SBSQ NF CARE LOW MDM 20: CPT | Performed by: FAMILY MEDICINE

## 2021-03-26 NOTE — PROGRESS NOTES
[facility]  [address]  [phone]  Facility: [place] [address]          NAME: Jeannette Ragland  AGE: 80 y o  SEX: female    DATE OF ENCOUNTER: 3/26/2021    Chief Complaint     R ear wax, hearing problem and left eyelid soreness  History of Present Illness     HPI    The following portions of the patient's history were reviewed and updated as appropriate (from facility chart and hospital records): allergies, current medications, past family history, past medical history, past social history, past surgical history and problem list   Pt was seen and examined per staff report of pt's left eyelid erythema and pt's complaint of right ear wax and difficulty hearing  Pt denies any problem with left ear, no problem or change in vision  Review of Systems     Review of Systems   HENT: Positive for hearing loss (of right ear,  " I think I have wax!")  Eyes:        No change in vision, spotty area in mid left upper eyelid is "sore"       Active Problem List     Patient Active Problem List   Diagnosis    Elevated troponin    Status post placement of cardiac pacemaker    Urinary retention    Pacemaker    Hypertension    Depression    Late onset Alzheimer's disease without behavioral disturbance (HCC)    Elevated TSH    Hyponatremia    Other constipation    Chronic diastolic congestive heart failure (HCC)    Chronic left shoulder pain    Impacted cerumen of right ear    Localized edema    Sebaceous cyst    Hordeolum externum of left upper eyelid       Objective     Vitals: afebrile     Physical Exam  HENT:      Right Ear: Ear canal and external ear normal  There is impacted cerumen  Left Ear: Tympanic membrane, ear canal and external ear normal  There is no impacted cerumen  Ears:      Comments: R TM is occluded with cerumen   Eyes:      General:         Right eye: No discharge  Left eye: No discharge        Extraocular Movements: Extraocular movements intact  Conjunctiva/sclera: Conjunctivae normal       Pupils: Pupils are equal, round, and reactive to light  Comments: Left upper eyelid in middle has raised nodular erythematous skin change, slightly tender  Pertinent Laboratory/Diagnostic Studies:  No lab for this visit     Current Medications   Medication list in facility chart was reviewed and necessary changes made  Assessment and Plan   Hordeolum externum of left upper eyelid  Warm compress q shift with close monitoring  Impacted cerumen of right ear  Ordered debrox ear drop for 5 days         Juan Tai MD  4/52/21619:62 PM

## 2021-03-31 ENCOUNTER — NURSING HOME VISIT (OUTPATIENT)
Dept: GERIATRICS | Facility: OTHER | Age: 86
End: 2021-03-31
Payer: MEDICARE

## 2021-03-31 DIAGNOSIS — H61.21 IMPACTED CERUMEN OF RIGHT EAR: ICD-10-CM

## 2021-03-31 DIAGNOSIS — H00.014 HORDEOLUM EXTERNUM OF LEFT UPPER EYELID: Primary | ICD-10-CM

## 2021-03-31 PROCEDURE — 99308 SBSQ NF CARE LOW MDM 20: CPT | Performed by: FAMILY MEDICINE

## 2021-04-05 ENCOUNTER — NURSING HOME VISIT (OUTPATIENT)
Dept: GERIATRICS | Facility: OTHER | Age: 86
End: 2021-04-05

## 2021-04-05 DIAGNOSIS — H61.21 IMPACTED CERUMEN OF RIGHT EAR: Primary | ICD-10-CM

## 2021-04-05 NOTE — PROGRESS NOTES
Ear cerumen removal    Date/Time: 4/5/2021 2:48 PM  Performed by: Basil Bowers MD  Authorized by: Basil Bowers MD   Universal Protocol:  Consent: Verbal consent obtained  Written consent not obtained  Risks and benefits: risks, benefits and alternatives were discussed  Consent given by: patient  Time out: Immediately prior to procedure a "time out" was called to verify the correct patient, procedure, equipment, support staff and site/side marked as required  Timeout called at: 4/5/2021 11:30 AM   Patient understanding: patient states understanding of the procedure being performed  Relevant documents: relevant documents present and verified  Test results available and properly labeled: N/A  Site marked: N/A  Imaging studies available: R/A  Required blood products, implants, devices, and special equipment available: N/A  Patient identity confirmation method: pt is long term resident uder my care  Patient location:  Bedside  Indications / Diagnosis:  Cerumen impaction   Procedure details:     Local anesthetic:  None    Location:  R ear    Procedure type comment:  Cerumen removal loop usage     Approach:  Natural orifice    Equipment used:  Cerumen removal loop   Post-procedure details:     Complication:  None    Hearing quality:  Improved    Patient tolerance of procedure: Tolerated well, no immediate complications  Comments:      Pt has been having [employer] for few days, and had 2 courses of 5 days Debrox ear drops, pt reports better hearing  Initially pt's Right ear TM was occluded by cerumen  I removed a large amount of cerumen using a loop, no issues after procedure  Pt's hearing improved after procedure         Facility: [facility]/

## 2021-04-16 ENCOUNTER — NURSING HOME VISIT (OUTPATIENT)
Dept: GERIATRICS | Facility: OTHER | Age: 86
End: 2021-04-16
Payer: MEDICARE

## 2021-04-16 DIAGNOSIS — G30.1 LATE ONSET ALZHEIMER'S DISEASE WITHOUT BEHAVIORAL DISTURBANCE (HCC): Primary | ICD-10-CM

## 2021-04-16 DIAGNOSIS — R33.9 URINARY RETENTION: ICD-10-CM

## 2021-04-16 DIAGNOSIS — I10 ESSENTIAL HYPERTENSION: ICD-10-CM

## 2021-04-16 DIAGNOSIS — F02.80 LATE ONSET ALZHEIMER'S DISEASE WITHOUT BEHAVIORAL DISTURBANCE (HCC): Primary | ICD-10-CM

## 2021-04-16 DIAGNOSIS — H61.21 IMPACTED CERUMEN OF RIGHT EAR: ICD-10-CM

## 2021-04-16 DIAGNOSIS — E87.1 HYPONATREMIA: ICD-10-CM

## 2021-04-16 DIAGNOSIS — I50.32 CHRONIC DIASTOLIC CONGESTIVE HEART FAILURE (HCC): ICD-10-CM

## 2021-04-16 PROCEDURE — 99309 SBSQ NF CARE MODERATE MDM 30: CPT | Performed by: FAMILY MEDICINE

## 2021-04-16 NOTE — PROGRESS NOTES
[address] [facility]  [address]  [phone]  Facility:[place] [address]          NAME: Loi Dumont  AGE: 80 y o  SEX: female    DATE OF ENCOUNTER: 4/16/2021    Chief Complaint     "I can't hear again, I think I have wax again!"    History of Present Illness     HPI    The following portions of the patient's history were reviewed and updated as appropriate (from facility chart and hospital records): allergies, current medications, past family history, past medical history, past social history, past surgical history and problem list  Pt was seen and examined for f/u on Dementia,urinary retention, CHF, Hyponatremia, and HTN  Pt reports that her left ear is "clogged" again and she has problem hearing from her left ear  No issues with gray cath  BP stable  Walks with staff  On restorative care  No sign or symptoms of CHF exacerbation  Last lab in Feb stable  No behaviors, overall stable  Wt stable  Review of Systems     Review of Systems   Constitutional: Negative  HENT: Negative          "my right ear is clogged again"   Eyes: Negative  Respiratory: Negative  Cardiovascular: Negative  Gastrointestinal: Negative  Endocrine: Negative  Genitourinary: Negative  Musculoskeletal: Negative  Skin: Negative  Allergic/Immunologic: Negative  Neurological: Negative  Hematological: Negative  Psychiatric/Behavioral: Negative  All other systems reviewed and are negative        Active Problem List     Patient Active Problem List   Diagnosis    Elevated troponin    Status post placement of cardiac pacemaker    Urinary retention    Pacemaker    Hypertension    Depression    Late onset Alzheimer's disease without behavioral disturbance (HCC)    Elevated TSH    Hyponatremia    Other constipation    Chronic diastolic congestive heart failure (HCC)    Chronic left shoulder pain    Impacted cerumen of right ear    Localized edema    Sebaceous cyst    Hordeolum externum of left upper eyelid       Objective     Vitals: wt:143  9Ibs             BP:132/74    UT:68    Afebrile     Physical Exam  Vitals signs and nursing note reviewed  Constitutional:       General: She is not in acute distress  Appearance: Normal appearance  She is well-developed  She is not ill-appearing, toxic-appearing or diaphoretic  HENT:      Head: Normocephalic and atraumatic  Right Ear: External ear normal       Left Ear: External ear normal  There is impacted cerumen  Ears:      Comments: Left TM is occluded by Cerumen  Nose: Rhinorrhea present  No congestion  Mouth/Throat:      Comments: Dentures  Eyes:      General: No scleral icterus  Right eye: No discharge  Left eye: No discharge  Extraocular Movements: Extraocular movements intact  Conjunctiva/sclera: Conjunctivae normal       Pupils: Pupils are equal, round, and reactive to light  Neck:      Musculoskeletal: Normal range of motion and neck supple  No neck rigidity or muscular tenderness  Cardiovascular:      Rate and Rhythm: Normal rate and regular rhythm  Heart sounds: Normal heart sounds  No murmur  No friction rub  No gallop  Pulmonary:      Effort: Pulmonary effort is normal  No respiratory distress  Breath sounds: Normal breath sounds  No stridor  No wheezing, rhonchi or rales  Chest:      Chest wall: No tenderness  Abdominal:      General: Abdomen is flat  Bowel sounds are normal  There is no distension  Palpations: Abdomen is soft  There is no mass  Tenderness: There is no abdominal tenderness  There is no guarding or rebound  Hernia: No hernia is present  Comments: Bergeron bag to left leg  Genitourinary:     Comments: Deferred  Musculoskeletal:         General: No swelling, tenderness, deformity or signs of injury  Right lower leg: Edema present  Left lower leg: Edema present  Comments: In WC, sitting   Limited ROM of UEs, and LEs  Lymphadenopathy:      Cervical: No cervical adenopathy  Skin:     General: Skin is warm and dry  Coloration: Skin is not jaundiced or pale  Findings: No bruising, erythema, lesion or rash  Comments: Didn't examine sacral area  Neurological:      Mental Status: She is alert and oriented to person, place, and time  Cranial Nerves: Cranial nerve deficit present  Psychiatric:         Behavior: Behavior normal          Pertinent Laboratory/Diagnostic Studies:  2/3:CMP, 1/28:CBC    Current Medications   Medication list in facility chart was reviewed and necessary changes made  Assessment and Plan   Late onset Alzheimer's disease without behavioral disturbance  Continues with NH care, stable  No behaviors  Wt stable  Chronic diastolic congestive heart failure (HCC)  On Lasix, no exacerbation  Hypertension  BP stable with Metoprolol, and Amlodipine  Will continue with monitoring  Impacted cerumen of right ear  Recurrent  Small amount of hard cerumen was removed  Re-ordered Debrox for 5 days  Urinary retention  2nd to neurogenic bladder, no issues with gray  Hyponatremia  Last lab stable in February, will continue with monitoring           Romy Powell MD  2/52/01410:04 PM

## 2021-04-23 ENCOUNTER — NURSING HOME VISIT (OUTPATIENT)
Dept: GERIATRICS | Facility: OTHER | Age: 86
End: 2021-04-23
Payer: MEDICARE

## 2021-04-23 DIAGNOSIS — H61.21 IMPACTED CERUMEN OF RIGHT EAR: Primary | ICD-10-CM

## 2021-04-23 PROCEDURE — 69210 REMOVE IMPACTED EAR WAX UNI: CPT | Performed by: FAMILY MEDICINE

## 2021-04-23 NOTE — PROGRESS NOTES
Ear cerumen removal    Date/Time: 4/23/2021 11:23 AM  Performed by: Sharif Dugan MD  Authorized by: Sharif Dugan MD   Universal Protocol:  Consent: Verbal consent obtained  Written consent not obtained  Consent given by: patient  Timeout called at: 4/23/2021 11:24 AM   Patient understanding: patient states understanding of the procedure being performed      Patient location:  Bedside  Indications / Diagnosis:  Cerumen impaction  Procedure details:     Local anesthetic:  None    Location:  R ear    Procedure type: curette      Approach:  External  Post-procedure details:     Complication:  Macerated skin (mildly )    Hearing quality:  Improved    Patient tolerance of procedure: Tolerated well, no immediate complications  Comments:      Pt has received 5 days of Debrox drop in 2 settings, and she states that her hearing is better  Today after removal of large amount of hard cerumen, TM partially visualized with white discoloration and irritated ear canal, I ordered Floxin ear drop  No specific concerning complication  Provider: Sharif Dugan  [address] [facility]  [address]  [place], [place], [address]  Facility: [place] [address]

## 2021-05-17 ENCOUNTER — NURSING HOME VISIT (OUTPATIENT)
Dept: GERIATRICS | Facility: OTHER | Age: 86
End: 2021-05-17
Payer: MEDICARE

## 2021-05-17 DIAGNOSIS — R41.82 ALTERED MENTAL STATUS, UNSPECIFIED ALTERED MENTAL STATUS TYPE: Primary | ICD-10-CM

## 2021-05-17 PROCEDURE — 99308 SBSQ NF CARE LOW MDM 20: CPT | Performed by: FAMILY MEDICINE

## 2021-05-17 NOTE — PROGRESS NOTES
[facility]  [address]  [phone]  Facility: [place] [address]          NAME: Maranda Robison  AGE: 80 y o  SEX: female    DATE OF ENCOUNTER: 5/17/2021    Chief Complaint     Pt is not providing info     History of Present Illness     HPI    The following portions of the patient's history were reviewed and updated as appropriate (from facility chart and hospital records): allergies, current medications, past family history, past medical history, past social history, past surgical history and problem list   Pt was seen and examined per staff report and concern of pt's altered mental status this morning  Pt has didn't have any breakfast however when her lab results were called to me, pt was eating lunch as well  Pt's Na is at 124 today  Pt was started on SQ fluid and per staff report later after my visit pt also took her Na tabs at lunch time  Review of Systems     Review of Systems   Unable to perform ROS: Mental status change       Active Problem List     Patient Active Problem List   Diagnosis    Altered mental status    Elevated troponin    Status post placement of cardiac pacemaker    Urinary retention    Pacemaker    Hypertension    Depression    Late onset Alzheimer's disease without behavioral disturbance (HCC)    Elevated TSH    Hyponatremia    Other constipation    Chronic diastolic congestive heart failure (HCC)    Chronic left shoulder pain    Impacted cerumen of right ear    Localized edema    Sebaceous cyst    Hordeolum externum of left upper eyelid       Objective     Vitals: BP, HR stable, Afebrile     BS:99    Physical Exam  Constitutional:       Appearance: She is ill-appearing  Comments: Drowsy but arousable   HENT:      Ears:      Comments: [place]     Mouth/Throat:      Mouth: Mucous membranes are dry  Comments: Tongue out but with chest thrust she moves her tongue in  Eyes:      Extraocular Movements: Extraocular movements intact  Conjunctiva/sclera: Conjunctivae normal       Pupils: Pupils are equal, round, and reactive to light  Neck:      Musculoskeletal: Neck supple  No neck rigidity or muscular tenderness  Cardiovascular:      Rate and Rhythm: Normal rate and regular rhythm  Heart sounds: No murmur  No friction rub  No gallop  Comments: L ACW pacer in place   Pulmonary:      Effort: Pulmonary effort is normal  No respiratory distress  Breath sounds: Normal breath sounds  No stridor  No wheezing, rhonchi or rales  Chest:      Chest wall: No tenderness  Abdominal:      General: Abdomen is flat  Bowel sounds are normal  There is no distension  Palpations: Abdomen is soft  There is no mass  Tenderness: There is abdominal tenderness (??? tenderness on suprapubic area )  There is no guarding or rebound  Hernia: No hernia is present  Comments: Bergeron bag to floor    Genitourinary:     Comments: deferred  Musculoskeletal:      Comments: In bed  Limited ROM  Skin:     General: Skin is warm  Coloration: Skin is not jaundiced or pale  Findings: No bruising, erythema, lesion or rash  Neurological:      Comments: Kiana, limited with pt's current mental status          Pertinent Laboratory/Diagnostic Studies:  CBC, CMP done today results were called to me by nursing staff     Current Medications   Medication list in facility chart was reviewed and necessary changes made  Assessment and Plan   Altered mental status  Most likely 2nd to low Na, SQ Nacl was started, will monitor closely, repeat BMP tomorrow  Vitals q shift         Luciana Don MD  0/22/26137:11 PM

## 2021-05-17 NOTE — ASSESSMENT & PLAN NOTE
Most likely 2nd to low Na, SQ Nacl was started, will monitor closely, repeat BMP tomorrow  Vitals q shift

## 2021-05-21 ENCOUNTER — NURSING HOME VISIT (OUTPATIENT)
Dept: GERIATRICS | Facility: OTHER | Age: 86
End: 2021-05-21
Payer: MEDICARE

## 2021-05-21 DIAGNOSIS — I10 ESSENTIAL HYPERTENSION: Primary | ICD-10-CM

## 2021-05-21 DIAGNOSIS — G30.1 LATE ONSET ALZHEIMER'S DISEASE WITHOUT BEHAVIORAL DISTURBANCE (HCC): ICD-10-CM

## 2021-05-21 DIAGNOSIS — E87.1 HYPONATREMIA: ICD-10-CM

## 2021-05-21 DIAGNOSIS — R33.9 URINARY RETENTION: ICD-10-CM

## 2021-05-21 DIAGNOSIS — F02.80 LATE ONSET ALZHEIMER'S DISEASE WITHOUT BEHAVIORAL DISTURBANCE (HCC): ICD-10-CM

## 2021-05-21 DIAGNOSIS — I50.32 CHRONIC DIASTOLIC CONGESTIVE HEART FAILURE (HCC): ICD-10-CM

## 2021-05-21 PROCEDURE — 99309 SBSQ NF CARE MODERATE MDM 30: CPT | Performed by: FAMILY MEDICINE

## 2021-05-21 NOTE — PROGRESS NOTES
[facility]  [address]  [phone]  Facility: [place] [address]          NAME: Nader Winkler  AGE: 80 y o  SEX: female    DATE OF ENCOUNTER: 5/21/2021    Chief Complaint     Pt has no complaint today     History of Present Illness     HPI    The following portions of the patient's history were reviewed and updated as appropriate (from facility chart and hospital records): allergies, current medications, past family history, past medical history, past social history, past surgical history and problem list  Pt was seen and examined of recent altered mental status, CHF, Dementia, urinary retention, Hyponatremia, and HTN  No issues with gray cath  Pt's Na has been improved and was at 130 on yesterday labs  Pt's vitals have been stable  Pt is back to her base normal today and her SQ fluid was discontinued today  Pt has no complaint today, no sign or symptoms of CHF exacerbation  Review of Systems     Review of Systems   Constitutional: Negative  HENT: Negative  Eyes: Negative  Respiratory: Negative  Cardiovascular: Negative  Gastrointestinal: Negative  Endocrine: Negative  Genitourinary: Negative  Musculoskeletal: Negative  Skin: Negative  Allergic/Immunologic: Negative  Neurological: Negative  Hematological: Negative  Psychiatric/Behavioral: Negative  All other systems reviewed and are negative        Active Problem List     Patient Active Problem List   Diagnosis    Altered mental status    Elevated troponin    Status post placement of cardiac pacemaker    Urinary retention    Pacemaker    Hypertension    Depression    Late onset Alzheimer's disease without behavioral disturbance (HCC)    Elevated TSH    Hyponatremia    Other constipation    Chronic diastolic congestive heart failure (HCC)    Chronic left shoulder pain    Impacted cerumen of right ear    Localized edema    Sebaceous cyst    Hordeolum externum of left upper eyelid       Objective     Vitals: wt:143 6Ibs         BP:115/58    Afebrile            Physical Exam  Vitals signs and nursing note reviewed  Constitutional:       General: She is not in acute distress  Appearance: Normal appearance  She is well-developed  She is not ill-appearing, toxic-appearing or diaphoretic  HENT:      Head: Normocephalic and atraumatic  Right Ear: External ear normal       Left Ear: External ear normal       Ears:      Comments: [place]     Nose: Nose normal  No congestion or rhinorrhea  Mouth/Throat:      Comments: Dentures   Eyes:      General: No scleral icterus  Right eye: No discharge  Left eye: No discharge  Conjunctiva/sclera: Conjunctivae normal       Pupils: Pupils are equal, round, and reactive to light  Neck:      Musculoskeletal: Normal range of motion and neck supple  No neck rigidity or muscular tenderness  Cardiovascular:      Rate and Rhythm: Normal rate and regular rhythm  Heart sounds: Normal heart sounds  No murmur  No friction rub  No gallop  Comments: Pacer on L ACW  Pulmonary:      Effort: Pulmonary effort is normal  No respiratory distress  Breath sounds: Normal breath sounds  No stridor  No wheezing, rhonchi or rales  Chest:      Chest wall: No tenderness  Abdominal:      General: Abdomen is flat  Bowel sounds are normal  There is no distension  Palpations: Abdomen is soft  There is no mass  Tenderness: There is no abdominal tenderness  There is no guarding or rebound  Hernia: No hernia is present  Comments: Bergeron bag to floor  Genitourinary:     Comments: Deferred  Musculoskeletal:         General: No swelling, tenderness, deformity or signs of injury  Right lower leg: No edema (trace if any )  Left lower leg: No edema (trace if any )  Comments: In bed, sitting  Moves extremities  Lymphadenopathy:      Cervical: No cervical adenopathy     Skin:     General: Skin is warm and dry  Coloration: Skin is not jaundiced or pale  Findings: No bruising, erythema, lesion or rash  Comments: Didn't examine sacral area  Neurological:      Mental Status: She is alert  Cranial Nerves: Cranial nerve deficit ([place]) present  Comments: Forgetful, follows commands  Psychiatric:         Behavior: Behavior normal          Pertinent Laboratory/Diagnostic Studies:  BMP yesterday 5/17:CBC     Current Medications   Medication list in facility chart was reviewed and necessary changes made  Assessment and Plan   Hypertension  BP stable with Amlodipine and Metoprolol  Will have repeat labs on 5/24  Chronic diastolic congestive heart failure (HCC)  No sign or symptoms of CHF exacerbation  On lasix stable  Late onset Alzheimer's disease without behavioral disturbance (HCC)  No behaviors, wt stable  Continues with NH care  Urinary retention  2nd to neurogenic bladder, no issues with gray  Will continue with monitoring  Hyponatremia  Pt had recent significant drop of level, improved with SQ fluid replacement  Stable       Leida Valdez MD  9/56/49391:16 PM

## 2021-05-31 ENCOUNTER — APPOINTMENT (EMERGENCY)
Dept: RADIOLOGY | Facility: HOSPITAL | Age: 86
DRG: 643 | End: 2021-05-31
Payer: MEDICARE

## 2021-05-31 ENCOUNTER — APPOINTMENT (EMERGENCY)
Dept: CT IMAGING | Facility: HOSPITAL | Age: 86
DRG: 643 | End: 2021-05-31
Payer: MEDICARE

## 2021-05-31 ENCOUNTER — HOSPITAL ENCOUNTER (INPATIENT)
Facility: HOSPITAL | Age: 86
LOS: 5 days | Discharge: NURSING, REHAB OR LONG-TERM CARE | DRG: 643 | End: 2021-06-05
Attending: INTERNAL MEDICINE | Admitting: INTERNAL MEDICINE
Payer: MEDICARE

## 2021-05-31 ENCOUNTER — TELEPHONE (OUTPATIENT)
Dept: OTHER | Facility: OTHER | Age: 86
End: 2021-05-31

## 2021-05-31 DIAGNOSIS — R56.9 WITNESSED SEIZURE-LIKE ACTIVITY (HCC): ICD-10-CM

## 2021-05-31 DIAGNOSIS — K14.8 LARGE TONGUE: ICD-10-CM

## 2021-05-31 DIAGNOSIS — J90 PLEURAL EFFUSION ON LEFT: ICD-10-CM

## 2021-05-31 DIAGNOSIS — R13.10 DYSPHAGIA: ICD-10-CM

## 2021-05-31 DIAGNOSIS — N39.0 UTI (URINARY TRACT INFECTION): ICD-10-CM

## 2021-05-31 DIAGNOSIS — Z71.89 GOALS OF CARE, COUNSELING/DISCUSSION: ICD-10-CM

## 2021-05-31 DIAGNOSIS — E87.1 HYPONATREMIA: Primary | ICD-10-CM

## 2021-05-31 PROBLEM — I10 ESSENTIAL HYPERTENSION: Status: ACTIVE | Noted: 2017-01-17

## 2021-05-31 PROBLEM — F03.90 DEMENTIA (HCC): Status: ACTIVE | Noted: 2021-05-31

## 2021-05-31 LAB
ALBUMIN SERPL BCP-MCNC: 3.2 G/DL (ref 3.5–5)
ALP SERPL-CCNC: 209 U/L (ref 46–116)
ALT SERPL W P-5'-P-CCNC: 32 U/L (ref 12–78)
ANION GAP SERPL CALCULATED.3IONS-SCNC: 11 MMOL/L (ref 4–13)
ANION GAP SERPL CALCULATED.3IONS-SCNC: 12 MMOL/L (ref 4–13)
ANION GAP SERPL CALCULATED.3IONS-SCNC: 8 MMOL/L (ref 4–13)
AST SERPL W P-5'-P-CCNC: 30 U/L (ref 5–45)
ATRIAL RATE: 68 BPM
BACTERIA UR QL AUTO: ABNORMAL /HPF
BASOPHILS # BLD AUTO: 0.03 THOUSANDS/ΜL (ref 0–0.1)
BASOPHILS NFR BLD AUTO: 0 % (ref 0–1)
BILIRUB SERPL-MCNC: 0.47 MG/DL (ref 0.2–1)
BILIRUB UR QL STRIP: NEGATIVE
BUN SERPL-MCNC: 11 MG/DL (ref 5–25)
BUN SERPL-MCNC: 11 MG/DL (ref 5–25)
BUN SERPL-MCNC: 14 MG/DL (ref 5–25)
CALCIUM ALBUM COR SERPL-MCNC: 9.3 MG/DL (ref 8.3–10.1)
CALCIUM SERPL-MCNC: 8.2 MG/DL (ref 8.3–10.1)
CALCIUM SERPL-MCNC: 8.5 MG/DL (ref 8.3–10.1)
CALCIUM SERPL-MCNC: 8.7 MG/DL (ref 8.3–10.1)
CHLORIDE SERPL-SCNC: 87 MMOL/L (ref 100–108)
CHLORIDE SERPL-SCNC: 91 MMOL/L (ref 100–108)
CHLORIDE SERPL-SCNC: 92 MMOL/L (ref 100–108)
CLARITY UR: ABNORMAL
CO2 SERPL-SCNC: 22 MMOL/L (ref 21–32)
CO2 SERPL-SCNC: 23 MMOL/L (ref 21–32)
CO2 SERPL-SCNC: 23 MMOL/L (ref 21–32)
COLOR UR: YELLOW
CREAT SERPL-MCNC: 0.58 MG/DL (ref 0.6–1.3)
CREAT SERPL-MCNC: 0.69 MG/DL (ref 0.6–1.3)
CREAT SERPL-MCNC: 0.89 MG/DL (ref 0.6–1.3)
EOSINOPHIL # BLD AUTO: 0.04 THOUSAND/ΜL (ref 0–0.61)
EOSINOPHIL NFR BLD AUTO: 0 % (ref 0–6)
ERYTHROCYTE [DISTWIDTH] IN BLOOD BY AUTOMATED COUNT: 12.7 % (ref 11.6–15.1)
GFR SERPL CREATININE-BSD FRML MDRD: 57 ML/MIN/1.73SQ M
GFR SERPL CREATININE-BSD FRML MDRD: 76 ML/MIN/1.73SQ M
GFR SERPL CREATININE-BSD FRML MDRD: 81 ML/MIN/1.73SQ M
GLUCOSE SERPL-MCNC: 107 MG/DL (ref 65–140)
GLUCOSE SERPL-MCNC: 111 MG/DL (ref 65–140)
GLUCOSE SERPL-MCNC: 127 MG/DL (ref 65–140)
GLUCOSE UR STRIP-MCNC: NEGATIVE MG/DL
HCT VFR BLD AUTO: 34.4 % (ref 34.8–46.1)
HGB BLD-MCNC: 11.9 G/DL (ref 11.5–15.4)
HGB UR QL STRIP.AUTO: ABNORMAL
IMM GRANULOCYTES # BLD AUTO: 0.07 THOUSAND/UL (ref 0–0.2)
IMM GRANULOCYTES NFR BLD AUTO: 1 % (ref 0–2)
KETONES UR STRIP-MCNC: NEGATIVE MG/DL
LEUKOCYTE ESTERASE UR QL STRIP: ABNORMAL
LYMPHOCYTES # BLD AUTO: 0.93 THOUSANDS/ΜL (ref 0.6–4.47)
LYMPHOCYTES NFR BLD AUTO: 10 % (ref 14–44)
MCH RBC QN AUTO: 32.5 PG (ref 26.8–34.3)
MCHC RBC AUTO-ENTMCNC: 34.6 G/DL (ref 31.4–37.4)
MCV RBC AUTO: 94 FL (ref 82–98)
MONOCYTES # BLD AUTO: 0.71 THOUSAND/ΜL (ref 0.17–1.22)
MONOCYTES NFR BLD AUTO: 8 % (ref 4–12)
NEUTROPHILS # BLD AUTO: 7.43 THOUSANDS/ΜL (ref 1.85–7.62)
NEUTS SEG NFR BLD AUTO: 81 % (ref 43–75)
NITRITE UR QL STRIP: POSITIVE
NON-SQ EPI CELLS URNS QL MICRO: ABNORMAL /HPF
NRBC BLD AUTO-RTO: 0 /100 WBCS
NT-PROBNP SERPL-MCNC: 578 PG/ML
OSMOLALITY UR: 490 MMOL/KG
P AXIS: 0 DEGREES
PH UR STRIP.AUTO: 7 [PH] (ref 4.5–8)
PLATELET # BLD AUTO: 346 THOUSANDS/UL (ref 149–390)
PMV BLD AUTO: 8.4 FL (ref 8.9–12.7)
POTASSIUM SERPL-SCNC: 3.8 MMOL/L (ref 3.5–5.3)
POTASSIUM SERPL-SCNC: 4.7 MMOL/L (ref 3.5–5.3)
POTASSIUM SERPL-SCNC: 5 MMOL/L (ref 3.5–5.3)
PR INTERVAL: 250 MS
PROT SERPL-MCNC: 7.1 G/DL (ref 6.4–8.2)
PROT UR STRIP-MCNC: ABNORMAL MG/DL
QRS AXIS: 136 DEGREES
QRSD INTERVAL: 64 MS
QT INTERVAL: 370 MS
QTC INTERVAL: 418 MS
RBC # BLD AUTO: 3.66 MILLION/UL (ref 3.81–5.12)
RBC #/AREA URNS AUTO: ABNORMAL /HPF
SARS-COV-2 RNA RESP QL NAA+PROBE: NEGATIVE
SODIUM 24H UR-SCNC: 71 MOL/L
SODIUM SERPL-SCNC: 121 MMOL/L (ref 136–145)
SODIUM SERPL-SCNC: 122 MMOL/L (ref 136–145)
SODIUM SERPL-SCNC: 126 MMOL/L (ref 136–145)
SP GR UR STRIP.AUTO: 1.01 (ref 1–1.03)
T WAVE AXIS: 32 DEGREES
TROPONIN I SERPL-MCNC: <0.02 NG/ML
TSH SERPL DL<=0.05 MIU/L-ACNC: 3.09 UIU/ML (ref 0.36–3.74)
UROBILINOGEN UR QL STRIP.AUTO: 1 E.U./DL
VENTRICULAR RATE: 77 BPM
WBC # BLD AUTO: 9.21 THOUSAND/UL (ref 4.31–10.16)
WBC #/AREA URNS AUTO: ABNORMAL /HPF

## 2021-05-31 PROCEDURE — U0003 INFECTIOUS AGENT DETECTION BY NUCLEIC ACID (DNA OR RNA); SEVERE ACUTE RESPIRATORY SYNDROME CORONAVIRUS 2 (SARS-COV-2) (CORONAVIRUS DISEASE [COVID-19]), AMPLIFIED PROBE TECHNIQUE, MAKING USE OF HIGH THROUGHPUT TECHNOLOGIES AS DESCRIBED BY CMS-2020-01-R: HCPCS | Performed by: PHYSICIAN ASSISTANT

## 2021-05-31 PROCEDURE — 87186 SC STD MICRODIL/AGAR DIL: CPT

## 2021-05-31 PROCEDURE — 81001 URINALYSIS AUTO W/SCOPE: CPT

## 2021-05-31 PROCEDURE — 70450 CT HEAD/BRAIN W/O DYE: CPT

## 2021-05-31 PROCEDURE — 84484 ASSAY OF TROPONIN QUANT: CPT | Performed by: INTERNAL MEDICINE

## 2021-05-31 PROCEDURE — 99222 1ST HOSP IP/OBS MODERATE 55: CPT | Performed by: NURSE PRACTITIONER

## 2021-05-31 PROCEDURE — 80048 BASIC METABOLIC PNL TOTAL CA: CPT | Performed by: INTERNAL MEDICINE

## 2021-05-31 PROCEDURE — 93010 ELECTROCARDIOGRAM REPORT: CPT | Performed by: INTERNAL MEDICINE

## 2021-05-31 PROCEDURE — 83880 ASSAY OF NATRIURETIC PEPTIDE: CPT | Performed by: PHYSICIAN ASSISTANT

## 2021-05-31 PROCEDURE — 1123F ACP DISCUSS/DSCN MKR DOCD: CPT | Performed by: PHYSICIAN ASSISTANT

## 2021-05-31 PROCEDURE — U0005 INFEC AGEN DETEC AMPLI PROBE: HCPCS | Performed by: PHYSICIAN ASSISTANT

## 2021-05-31 PROCEDURE — 84300 ASSAY OF URINE SODIUM: CPT | Performed by: NURSE PRACTITIONER

## 2021-05-31 PROCEDURE — 99285 EMERGENCY DEPT VISIT HI MDM: CPT | Performed by: PHYSICIAN ASSISTANT

## 2021-05-31 PROCEDURE — 71046 X-RAY EXAM CHEST 2 VIEWS: CPT

## 2021-05-31 PROCEDURE — 36415 COLL VENOUS BLD VENIPUNCTURE: CPT

## 2021-05-31 PROCEDURE — 99223 1ST HOSP IP/OBS HIGH 75: CPT | Performed by: INTERNAL MEDICINE

## 2021-05-31 PROCEDURE — 85025 COMPLETE CBC W/AUTO DIFF WBC: CPT | Performed by: INTERNAL MEDICINE

## 2021-05-31 PROCEDURE — 96365 THER/PROPH/DIAG IV INF INIT: CPT

## 2021-05-31 PROCEDURE — 87086 URINE CULTURE/COLONY COUNT: CPT

## 2021-05-31 PROCEDURE — 96361 HYDRATE IV INFUSION ADD-ON: CPT

## 2021-05-31 PROCEDURE — 84443 ASSAY THYROID STIM HORMONE: CPT | Performed by: INTERNAL MEDICINE

## 2021-05-31 PROCEDURE — 99285 EMERGENCY DEPT VISIT HI MDM: CPT

## 2021-05-31 PROCEDURE — 83935 ASSAY OF URINE OSMOLALITY: CPT | Performed by: NURSE PRACTITIONER

## 2021-05-31 PROCEDURE — 80053 COMPREHEN METABOLIC PANEL: CPT | Performed by: INTERNAL MEDICINE

## 2021-05-31 PROCEDURE — 93005 ELECTROCARDIOGRAM TRACING: CPT

## 2021-05-31 PROCEDURE — G0425 INPT/ED TELECONSULT30: HCPCS | Performed by: PSYCHIATRY & NEUROLOGY

## 2021-05-31 RX ORDER — ONDANSETRON 2 MG/ML
4 INJECTION INTRAMUSCULAR; INTRAVENOUS EVERY 4 HOURS PRN
Status: DISCONTINUED | OUTPATIENT
Start: 2021-05-31 | End: 2021-06-06 | Stop reason: HOSPADM

## 2021-05-31 RX ORDER — ACETAMINOPHEN 325 MG/1
650 TABLET ORAL EVERY 6 HOURS PRN
Status: DISCONTINUED | OUTPATIENT
Start: 2021-05-31 | End: 2021-06-03

## 2021-05-31 RX ORDER — AMLODIPINE BESYLATE 5 MG/1
5 TABLET ORAL DAILY
Status: DISCONTINUED | OUTPATIENT
Start: 2021-05-31 | End: 2021-05-31

## 2021-05-31 RX ORDER — AMLODIPINE BESYLATE 5 MG/1
5 TABLET ORAL DAILY
Status: DISCONTINUED | OUTPATIENT
Start: 2021-06-01 | End: 2021-06-03

## 2021-05-31 RX ORDER — SODIUM CHLORIDE 1000 MG
1 TABLET, SOLUBLE MISCELLANEOUS 3 TIMES DAILY
Status: DISCONTINUED | OUTPATIENT
Start: 2021-05-31 | End: 2021-06-01

## 2021-05-31 RX ORDER — HEPARIN SODIUM 5000 [USP'U]/ML
5000 INJECTION, SOLUTION INTRAVENOUS; SUBCUTANEOUS EVERY 8 HOURS SCHEDULED
Status: DISCONTINUED | OUTPATIENT
Start: 2021-05-31 | End: 2021-06-04

## 2021-05-31 RX ORDER — SODIUM CHLORIDE 1000 MG
1 TABLET, SOLUBLE MISCELLANEOUS 3 TIMES DAILY
COMMUNITY
End: 2021-06-05 | Stop reason: HOSPADM

## 2021-05-31 RX ORDER — SODIUM CHLORIDE 9 MG/ML
75 INJECTION, SOLUTION INTRAVENOUS CONTINUOUS
Status: DISCONTINUED | OUTPATIENT
Start: 2021-05-31 | End: 2021-06-01

## 2021-05-31 RX ADMIN — CEFTRIAXONE SODIUM 1000 MG: 10 INJECTION, POWDER, FOR SOLUTION INTRAVENOUS at 09:11

## 2021-05-31 RX ADMIN — SODIUM CHLORIDE 1000 ML: 0.9 INJECTION, SOLUTION INTRAVENOUS at 08:02

## 2021-05-31 RX ADMIN — HEPARIN SODIUM 5000 UNITS: 5000 INJECTION INTRAVENOUS; SUBCUTANEOUS at 21:01

## 2021-05-31 RX ADMIN — SODIUM CHLORIDE TAB 1 GM 1 G: 1 TAB at 15:51

## 2021-05-31 RX ADMIN — SODIUM CHLORIDE 75 ML/HR: 0.9 INJECTION, SOLUTION INTRAVENOUS at 11:53

## 2021-05-31 RX ADMIN — HEPARIN SODIUM 5000 UNITS: 5000 INJECTION INTRAVENOUS; SUBCUTANEOUS at 13:38

## 2021-05-31 RX ADMIN — SODIUM CHLORIDE TAB 1 GM 1 G: 1 TAB at 21:01

## 2021-05-31 RX ADMIN — Medication 12.5 MG: at 21:02

## 2021-05-31 NOTE — ASSESSMENT & PLAN NOTE
25-year-old female with Alzheimer's disease, chronic diastolic CHF, hypertension, recurrent hyponatremia, presenting with a brief episode of seizure-like activity followed by poorly responsive state at [facility] on 05/31       Patient found to be hyponatremic with Na 121 and have  UTI  Concern for provoked seizure in the setting of hyponatremia and UTI  CT head on admission demonstrated no acute changes  Routine EEG demonstrated diffuse slowing but no focal or epileptogenic features  Patient appears at her described baseline, oriented only to self  No reported seizure activity since admission  Plan:  - To remain off AEDs as seizure was likely provoked as noted above  - Correction of metabolic/infectious derangements as per primary service/nephrology

## 2021-05-31 NOTE — SPEECH THERAPY NOTE
Speech Language/Pathology  Speech/Language Pathology  Assessment    Patient Name: Beatrice Coreas  ZFNVI'V Date: 5/31/2021     Problem List  Principal Problem: Witnessed seizure-like activity (HCC)  Active Problems:    Hypertension    Depression    Hyponatremia    Chronic diastolic congestive heart failure (HCC)    Essential hypertension    Diastolic CHF, chronic (HCC)    Past Medical History  Past Medical History:   Diagnosis Date    Acute bronchopneumonia 8/3/2019    Acute encephalopathy 8/18/2017    Acute respiratory failure ([facility] [address] ) 8/3/2019    Altered mental status     Depression     Diastolic CHF, chronic (HCC)     Dysphagia, oropharyngeal phase     Essential hypertension 01/17/2017    Hypertension     Impacted cerumen of right ear 9/16/2019    Malaise 5/27/2020    Multiple thyroid nodules     Pacemaker     Rhabdomyolysis 1/17/2017    Sepsis ([facility] [address] ) 8/3/2019    Urinary retention     Urinary tract infection 8/18/2017     Past Surgical History  Past Surgical History:   Procedure Laterality Date    CARDIAC PACEMAKER PLACEMENT        Bedside Swallow Evaluation:    Summary:  Pt presents w/ excessive drooling and inability to manage saliva  Pt was consistently wiping saliva from her mouth w/ wash cloth  Suctioned pt w/ michael  Cued to swallow  Unable to w/out a bolus, Trialed minimal amt of nectar thick liquid by tsp  Open mouth posture  Incomplete closure  Tongue thrust swallow  Partial liquid was suctioned back out  Laryngeal rise appeared weak  Cough x 1  Gave pt the suction to suction self  Speech is very difficult to understand, as pt's tongue is hanging out and she does not close her mouth  ? If Dentures at facility       Recommendations:  Diet: NPO, including meds for now   Oral care: suction prn  Administer meds: tube/iv  Aspiration precautions  Reflux precautions  Therapy Prognosis: unknown  Prognosis considerations: w/u in progress  Frequency: 2-5x/week    Goal(s):  Pt will tolerate least restrictive diet w/out s/s aspiration or oral/pharyngeal difficulties  Patient's goal: none stated    Consider consult w/:  Neurology  Nutrition    Reason for consult:  R/o aspiration  Determine safest and least restrictive diet  Change in mental status  New neuro event  H/o neurological disease  Current diet:  regular  Premorbid diet[de-identified]  Regular at snf  Previous VBS:  Here 8/5/19:  Video Barium Swallow Study  Summary:  Pt presented w/ at least mild oral stage, wfl pharyngeal stage this study  Mastication and transfer of soft solid were moderately to mod/severe  prolonged  Pt was unable to transfer the barium pill w/ thi nliquid but transferred it w/ puree wnl  No aspiration observed this study, though pt coughed throughout the study  Per gross screening there was esophageal stasis w/ puree and soft solid that cleared w/ liquid  Images are on PACS for review  Recommendations:  Diet:dysphagia 2 mechanical soft   Liquids: thin  Meds:whole in applesauce  Strategies: sips in between bites  Upright position  F/u ST tx: yes  Therapy Prognosis:favorable  CloseSupervision  Aspiration Precautions  Reflux Precautions  Results reviewed with: pt, nursing  Aspiration precautions posted  Seat upright 10-15 minutes prior to initiating meal to clear congestion prior to eating  O2 requirement:  2L nc  Voice/Speech:  "dysarthric", mod/severe  Social:  CB  Follows commands:  poorly               Cognitive Status:  alert  Oral mech exam:  Dentition:edentulous  Labial strength and ROM:reduced  Lingual strength and ROM:reduced  Mandibular strength and ROM:dnt  Secretion management:poor, constant drool      No oral feedings posted    Results d/w:  Pt, nursing, physician          Per ED:  Chief Complaint   Patient presents with    Altered Mental Status       patient with hx of alzheimers  per staff patient c/o facial pain last night   this morning patient was difficult to arouse, per staff approx 30 second seizure on arousal  patient currently responsive to painful stimuli  DNR      89y  o female with PMH of accelerated hypertension, acute encephalopathy, acute respiratory failure, AMS, depression, dysphagia, pacemaker placement, rhabdomyolysis and transient alteration of awareness presents to the ER for AMS  All history comes from nursing home staff at [facility] and patient's nephew Sue Hdzre  Last night patient was complaining of facial pain around her eyes and cheeks  This morning, staff states they witnessed a 35 second seizure where the patient's eyes were twitching and she was unresponsive  She has only been responding to painful stimuli since then  Per Sue Kraft, doctors at [facility] have been watching the patient's sodium levels, which have been low and they have been giving her Sodium tablets  Per Sue Kraft, over the last few weeks, patient has been increasingly confused   Nursing home staff deny any signs of infection

## 2021-05-31 NOTE — TELEPHONE ENCOUNTER
[phone]/VWK from [address] [place] [address] is Dave Quinn  29/Pt had a seizure    Steven Hooks was paged at [phone]    Please allow 20-30 mins for the provider to call you back  If you do not hear from the provider, please call us back

## 2021-05-31 NOTE — PLAN OF CARE
Problem: Potential for Falls  Goal: Patient will remain free of falls  Description: INTERVENTIONS:  - Assess patient frequently for physical needs  -  Identify cognitive and physical deficits and behaviors that affect risk of falls    -  [place] fall precautions as indicated by assessment   - Educate patient/family on patient safety including physical limitations  - Instruct patient to call for assistance with activity based on assessment  - Modify environment to reduce risk of injury  - Consider OT/PT consult to assist with strengthening/mobility  Outcome: Progressing     Problem: Prexisting or High Potential for Compromised Skin Integrity  Goal: Skin integrity is maintained or improved  Description: INTERVENTIONS:  - Identify patients at risk for skin breakdown  - Assess and monitor skin integrity  - Assess and monitor nutrition and hydration status  - Monitor labs   - Assess for incontinence   - Turn and reposition patient  - Assist with mobility/ambulation  - Relieve pressure over bony prominences  - Avoid friction and shearing  - Provide appropriate hygiene as needed including keeping skin clean and dry  - Evaluate need for skin moisturizer/barrier cream  - Collaborate with interdisciplinary team   - Patient/family teaching  - Consider wound care consult   Outcome: Progressing     Problem: NEUROSENSORY - ADULT  Goal: Achieves stable or improved neurological status  Description: INTERVENTIONS  - Monitor and report changes in neurological status  - Monitor vital signs such as temperature, blood pressure, glucose, and any other labs ordered   - Initiate measures to prevent increased intracranial pressure  - Monitor for seizure activity and implement precautions if appropriate      Outcome: Progressing  Goal: Remains free of injury related to seizures activity  Description: INTERVENTIONS  - Maintain airway, patient safety  and administer oxygen as ordered  - Monitor patient for seizure activity, document and report duration and description of seizure to physician/advanced practitioner  - If seizure occurs,  ensure patient safety during seizure  - Reorient patient post seizure  - Seizure pads on all 4 side rails  - Instruct patient/family to notify RN of any seizure activity including if an aura is experienced  - Instruct patient/family to call for assistance with activity based on nursing assessment  - Administer anti-seizure medications if ordered    Outcome: Progressing  Goal: Achieves maximal functionality and self care  Description: INTERVENTIONS  - Monitor swallowing and airway patency with patient fatigue and changes in neurological status  - Encourage and assist patient to increase activity and self care     - Encourage visually impaired, hearing impaired and aphasic patients to use assistive/communication devices  Outcome: Progressing     Problem: METABOLIC, FLUID AND ELECTROLYTES - ADULT  Goal: Electrolytes maintained within normal limits  Description: INTERVENTIONS:  - Monitor labs and assess patient for signs and symptoms of electrolyte imbalances  - Administer electrolyte replacement as ordered  - Monitor response to electrolyte replacements, including repeat lab results as appropriate  - Instruct patient on fluid and nutrition as appropriate  Outcome: Progressing  Goal: Fluid balance maintained  Description: INTERVENTIONS:  - Monitor labs   - Monitor I/O and WT  - Instruct patient on fluid and nutrition as appropriate  - Assess for signs & symptoms of volume excess or deficit  Outcome: Progressing     Problem: SKIN/TISSUE INTEGRITY - ADULT  Goal: Skin integrity remains intact  Description: INTERVENTIONS  - Identify patients at risk for skin breakdown  - Assess and monitor skin integrity  - Assess and monitor nutrition and hydration status  - Monitor labs (i e  albumin)  - Assess for incontinence   - Turn and reposition patient  - Assist with mobility/ambulation  - Relieve pressure over bony prominences  - Avoid friction and shearing  - Provide appropriate hygiene as needed including keeping skin clean and dry  - Evaluate need for skin moisturizer/barrier cream  - Collaborate with interdisciplinary team (i e  Nutrition, Rehabilitation, etc )   - Patient/family teaching  Outcome: Progressing     Problem: SAFETY ADULT  Goal: Maintain or return to baseline ADL function  Description: INTERVENTIONS:  -  Assess patient's ability to carry out ADLs; assess patient's baseline for ADL function and identify physical deficits which impact ability to perform ADLs (bathing, care of mouth/teeth, toileting, grooming, dressing, etc )  - Assess/evaluate cause of self-care deficits   - Assess range of motion  - Assess patient's mobility; develop plan if impaired  - Assess patient's need for assistive devices and provide as appropriate  - Encourage maximum independence but intervene and supervise when necessary  - Involve family in performance of ADLs  - Assess for home care needs following discharge   - Consider OT consult to assist with ADL evaluation and planning for discharge  - Provide patient education as appropriate  Outcome: Progressing  Goal: Maintain or return mobility status to optimal level  Description: INTERVENTIONS:  - Assess patient's baseline mobility status (ambulation, transfers, stairs, etc )    - Identify cognitive and physical deficits and behaviors that affect mobility  - Identify mobility aids required to assist with transfers and/or ambulation (gait belt, sit-to-stand, lift, walker, cane, etc )  - [place] fall precautions as indicated by assessment  - Record patient progress and toleration of activity level on Mobility SBAR; progress patient to next Phase/Stage  - Instruct patient to call for assistance with activity based on assessment  - Consider rehabilitation consult to assist with strengthening/weightbearing, etc   Outcome: Progressing     Problem: DISCHARGE PLANNING  Goal: Discharge to home or other facility with appropriate resources  Description: INTERVENTIONS:  - Identify barriers to discharge w/patient and caregiver  - Arrange for needed discharge resources and transportation as appropriate  - Identify discharge learning needs (meds, wound care, etc )  - Arrange for interpretive services to assist at discharge as needed  - Refer to Case Management Department for coordinating discharge planning if the patient needs post-hospital services based on physician/advanced practitioner order or complex needs related to functional status, cognitive ability, or social support system  Outcome: Progressing     Problem: Knowledge Deficit  Goal: Patient/family/caregiver demonstrates understanding of disease process, treatment plan, medications, and discharge instructions  Description: Complete learning assessment and assess knowledge base    Interventions:  - Provide teaching at level of understanding  - Provide teaching via preferred learning methods  Outcome: Progressing

## 2021-05-31 NOTE — CONSULTS
Consultation - Nephrology   Loi Dumont 80 y o  female MRN: [id number]  Unit/Bed#: E5 -01 Encounter: [id number]    ASSESSMENT/PLAN:  Hyponatremia:  Suspect hypovolemic hyponatremia  -  Presents with sodium level of 121   -  Received 1 L of normal saline   -  Started on normal saline at 75 cc/hour   -  Continues on sodium chloride tablets 1 g 3 times per day  -  Lasix on hold, will give as needed  -  Will check serum osmolality, urine osmolality, urine sodium, TSH, a m  cortisol, and uric acid level  -  Avoid rapid correction, goal to correct 6-8 mEq in 24 hours  -  Will trend BMP Q 8 hours  - baseline creatinine 0 6-0 8, creatinine currently at baseline  Seizure-like activity:  Suspected secondary to hyponatremia   - Neurology consulted, continues on seizure precautions  - CT head negative for acute intracranial abnormality  Possible UTI:  -  UA showed large blood, positive nitrites, innumerable WBCs , RBCs, and bacteria  -  Awaiting urine culture results  -  Continues on antibiotics  Chronic diastolic CHF:   Most recent echocardiogram shows EF of 70% and grade 1 diastolic dysfunction   -  Chest x-ray shows trace left pleural effusion   -  Continue daily weights, fluid restriction, I/O   -  OP diuretic: Lasix 20 mg every other day  -  May give Lasix as needed  Hypertension:  Overall acceptable for age  -  Continue on amlodipine and metoprolol  Transition to IV antihypertensives if unable to take oral intake  Other: Dementia      HISTORY OF PRESENT ILLNESS:  Requesting Physician: Irma Easley DO  Reason for Consult:   Hyponatremia    Loi Dumont is a 80y o  year old female  With past medical history of CHF, hypertension, dementia,who was [facility] after presenting with  Seizure-like activity  A renal consultation is requested today for assistance in the management of  Hyponatremia  information is obtained from patient's chart  Patient is unable to complete review of systems or HPI with me today due to mental status  Per chart, patient was difficult to arouse and had episode where her eyes were twitching and she was unresponsive  She has history of hyponatremia and was started on salt tablets for this      PAST MEDICAL HISTORY:  Past Medical History:   Diagnosis Date    Acute bronchopneumonia 8/3/2019    Acute encephalopathy 8/18/2017    Acute respiratory failure ([facility] [address] ) 8/3/2019    Altered mental status     Depression     Diastolic CHF, chronic (HCC)     Dysphagia, oropharyngeal phase     Essential hypertension 01/17/2017    Hypertension     Impacted cerumen of right ear 9/16/2019    Malaise 5/27/2020    Multiple thyroid nodules     Pacemaker     Rhabdomyolysis 1/17/2017    Sepsis ([facility] [address] ) 8/3/2019    Urinary retention     Urinary tract infection 8/18/2017       PAST SURGICAL HISTORY:  Past Surgical History:   Procedure Laterality Date    CARDIAC PACEMAKER PLACEMENT         ALLERGIES:  No Known Allergies    SOCIAL HISTORY:  Social History     Substance and Sexual Activity   Alcohol Use Never    Frequency: Never     Social History     Substance and Sexual Activity   Drug Use No     Social History     Tobacco Use   Smoking Status Never Smoker   Smokeless Tobacco Never Used       FAMILY HISTORY:  Family History   Problem Relation Age of Onset    No Known Problems Mother     No Known Problems Father        MEDICATIONS:    Current Facility-Administered Medications:     acetaminophen (TYLENOL) tablet 650 mg, 650 mg, Oral, Q6H PRN, Cliff Galloway DO    amLODIPine (NORVASC) tablet 5 mg, 5 mg, Oral, Daily, Cliff Galloway DO    [START ON 6/1/2021] cefTRIAXone (ROCEPHIN) 1,000 mg in dextrose 5 % 50 mL IVPB, 1,000 mg, Intravenous, Q24H, Cliff Galloway DO    heparin (porcine) subcutaneous injection 5,000 Units, 5,000 Units, Subcutaneous, Q8H [facility], Cliff Galloway DO    metoprolol tartrate (LOPRESSOR) partial tablet 12 5 mg, 12 5 mg, Oral, Q12H [address], Cliff Galloway DO    ondansetron (ZOFRAN) injection 4 mg, 4 mg, Intravenous, Q4H PRN, Blane Laird DO    sodium chloride 0 9 % infusion, 75 mL/hr, Intravenous, Continuous, Cliff Galloway DO, Last Rate: 75 mL/hr at 05/31/21 1153, 75 mL/hr at 05/31/21 1153    sodium chloride tablet 1 g, 1 g, Oral, TID, Blane DO Sisi    REVIEW OF SYSTEMS:  A complete review of systems was performed and found to be negative unless otherwise noted in the history of present illness  General: No fevers, chills  Unable to obtain due to patient's mental status  PHYSICAL EXAM:  Current Weight: Weight - Scale: 70 8 kg (156 lb 1 4 oz)  First Weight: Weight - Scale: 70 8 kg (156 lb 1 4 oz)  Vitals:    05/31/21 0830 05/31/21 0930 05/31/21 1030 05/31/21 1108   BP: 151/70 162/53 142/63 163/59   BP Location:    Right arm   Pulse: 70 72 68 71   Resp: 21 18 20 20   Temp:    98 4 °F (36 9 °C)   TempSrc:    Oral   SpO2: 100% 98% 97% 97%   Weight:           Intake/Output Summary (Last 24 hours) at 5/31/2021 1309  Last data filed at 5/31/2021 1230  Gross per 24 hour   Intake 1250 ml   Output 650 ml   Net 600 ml     General: NAD  Skin: warm, dry, intact, no rash  HEENT: Moist mucous membranes, sclera anicteric, normocephalic, atraumatic  Neck: No apparent JVD appreciated  Chest:lung sounds clear B/L, on RA   CVS:Regular rate and rhythm, no murmer   Abdomen: Soft, round, non-tender, +BS, gray catheter  Extremities: No B/L LE edema present  Neuro: lethargic, not responsive to speech, does not follow commands  Psych: appropriate mood and affect     Invasive Devices:   Urethral Catheter Latex 18 Fr   (Active)   Reasons to continue Urinary Catheter  Chronic urinary catheter 05/31/21 1134   Goal for Removal N/A- chronic gray 05/31/21 1134   Site Assessment Clean;Skin intact 05/31/21 1134   Collection Container Standard drainage bag 05/31/21 1134   Securement Method Tape 05/31/21 1134   Output (mL) 650 mL 05/31/21 1230 Lab Results:   Results from last 7 days   Lab Units 05/31/21  0702   WBC Thousand/uL 9 21   HEMOGLOBIN g/dL 11 9   HEMATOCRIT % 34 4*   PLATELETS Thousands/uL 346   SODIUM mmol/L 121*   POTASSIUM mmol/L 4 7   CHLORIDE mmol/L 87*   CO2 mmol/L 23   BUN mg/dL 14   CREATININE mg/dL 0 89   CALCIUM mg/dL 8 7   ALK PHOS U/L 209*   ALT U/L 32   AST U/L 30

## 2021-05-31 NOTE — ASSESSMENT & PLAN NOTE
· Acute on chronic hyponatremia  Will start on salt tablets as an outpatient  · Continue salt tablets for now  Continue NSS    Serial sodium and consult nephrology    Results from last 7 days   Lab Units 05/31/21  0702   SODIUM mmol/L 121*

## 2021-05-31 NOTE — H&P
Nupur Campos 1723 6/26/1929, 80 y o  female MRN: [id number]  Unit/Bed#: E5 -01 Encounter: [id number]  Primary Care Provider: Romy Powell MD   Date and time admitted to hospital: 5/31/2021  6:50 AM    Assessment and Plan  * Witnessed seizure-like activity [facility])  Assessment & Plan  · Seizure-like activity at nursing facility  Likely secondary to hyponatremia  · Currently with acute metabolic encephalopathy from hyponatremia vs postictal state  · Possible UTI:  Continue empiric ceftriaxone and follow urine culture  · Seizure precautions and have neurology evaluate    Hyponatremia  Assessment & Plan  · Acute on chronic hyponatremia  Will start on salt tablets as an outpatient  · Continue salt tablets for now  Continue NSS  Serial sodium and consult nephrology    Results from last 7 days   Lab Units 05/31/21  0702   SODIUM mmol/L 121*       Dementia ([facility] [address] )  Assessment & Plan  · Dementia without behavior disturbance  Currently postictal vs encephalopathic from hyponatremia    Diastolic CHF, chronic (HCC)  Assessment & Plan  Wt Readings from Last 3 Encounters:   05/31/21 70 8 kg (156 lb 1 4 oz)   11/14/19 67 6 kg (149 lb)   08/07/19 67 9 kg (149 lb 11 1 oz)     · Chronic diastolic CHF  Does have crackles bibasilar  Will continue furosemide for now  Hypertension  Assessment & Plan  · Continue amlodipine and metoprolol if able tolerate oral      VTE Prophylaxis: Heparin  Code Status: Level 3 - DNAR and DNI  Anticipated Length of Stay:  Patient will be admitted on an Inpatient basis with an anticipated length of stay of  greater than 2 midnights  Justification for Hospital Stay: Witnessed seizure-like activity [facility])  Total Time for Visit, including Counseling / Coordination of Care: xx mins  Greater than 50% of this total time spent on direct patient counseling and coordination of care      Chief Complaint:     Chief Complaint   Patient presents with   Annie Coleman Altered Mental Status     patient with hx of alzheimers  per staff patient c/o facial pain last night  this morning patient was difficult to arouse, per staff approx 30 second seizure on arousal  patient currently responsive to painful stimuli  DNR     History of Present Illness:    Karyle Lamas is a 80 y o  female with a past medical history of dementia CHF who presents with to the hospital with seizure-like activity  Due to encephalopathy and dementia the patient is not a reliable historian, history is obtained from charting and discussion with nephew Kel Arriola on telephone  The patient was noticed by staff difficult to arouse and had a 30 second episode where her eyes were twitching and she was unresponsive  The patient has been having difficulty with her sodium levels at the facility and was even started on salt tablets  In the emergency department she remained lethargic with a sodium 121 prompting admission  She denies any pain  Review of Systems:  Review of Systems   Unable to perform ROS: Mental status change (Discussion with nephew and review of charting)   Constitutional: Negative for chills, diaphoresis and fever  HENT: Negative for facial swelling  Eyes: Negative for visual disturbance  Respiratory: Negative for shortness of breath, wheezing and stridor  Cardiovascular: Negative for chest pain and palpitations  Gastrointestinal: Negative for abdominal distention, abdominal pain, diarrhea, nausea and vomiting  Genitourinary: Negative for hematuria  Musculoskeletal: Negative for back pain and myalgias  Skin: Negative for rash and wound  Neurological: Positive for seizures and speech difficulty  Negative for tremors  Psychiatric/Behavioral: Positive for confusion  Negative for agitation  All other systems reviewed and are negative          Past Medical and Surgical History:   Past Medical History:   Diagnosis Date    Acute bronchopneumonia 8/3/2019    Acute encephalopathy 8/18/2017    Acute respiratory failure ([facility] [address] ) 8/3/2019    Altered mental status     Depression     Diastolic CHF, chronic (HCC)     Dysphagia, oropharyngeal phase     Essential hypertension 01/17/2017    Hypertension     Impacted cerumen of right ear 9/16/2019    Malaise 5/27/2020    Multiple thyroid nodules     Pacemaker     Rhabdomyolysis 1/17/2017    Sepsis ([facility] [address] ) 8/3/2019    Urinary retention     Urinary tract infection 8/18/2017     Past Surgical History:   Procedure Laterality Date    CARDIAC PACEMAKER PLACEMENT       Meds/Allergies: Allergies: No Known Allergies  Prior to Admission Medications   Prescriptions Last Dose Informant Patient Reported? Taking?    Magnesium Hydroxide (MILK OF MAGNESIA PO)  Outside Facility (Specify) Yes Yes   Sig: Take 30 mL by mouth daily as needed   Sodium Phosphates (Enema Disposable) ENEM   Yes No   Sig: Insert 1 enema into the rectum   acetaminophen (TYLENOL) 650 mg suppository  Outside Facility (Specify) Yes Yes   Sig: Insert 650 mg into the rectum every 4 (four) hours as needed for mild pain or fever    amLODIPine (NORVASC) 2 5 mg tablet  Outside Facility (Specify) Yes Yes   Sig: Take 5 mg by mouth daily   bisacodyl (DULCOLAX) 10 mg suppository  Outside Facility (Specify) Yes Yes   Sig: Insert 10 mg into the rectum daily   ferrous sulfate 325 (65 Fe) mg tablet  Outside Facility (Specify) Yes No   Sig: Take 325 mg by mouth daily at bedtime   folic acid (FOLVITE) 1 mg tablet  Outside Facility (Specify) Yes Yes   Sig: Take 1 mg by mouth daily   furosemide (LASIX) 20 mg tablet  Outside Facility (Specify) No Yes   Sig: Take 1 tablet (20 mg total) by mouth every other day   metoprolol tartrate (LOPRESSOR) 25 mg tablet  Outside Facility (Specify) Yes Yes   Sig: Take 12 5 mg by mouth 2 (two) times a day   ondansetron (ZOFRAN) 4 mg tablet  Outside Facility (Specify) Yes Yes   Sig: Take 4 mg by mouth every 8 (eight) hours as needed for nausea or vomiting   polyethylene glycol (MIRALAX) 17 g packet  Outside Facility (Specify) No Yes   Sig: Take 17 g by mouth daily as needed (Constipation) for up to 30 days   sodium chloride 1 g tablet   Yes Yes   Sig: Take 1 g by mouth 3 (three) times a day      Facility-Administered Medications: None     Social History:     Social History     Socioeconomic History    Marital status: Single     Spouse name: Not on file    Number of children: Not on file    Years of education: Not on file    Highest education level: Not on file   Occupational History    Not on file   Social Needs    Financial resource strain: Not on file    Food insecurity     Worry: Not on file     Inability: Not on file   [employer] needs     Medical: Not on file     Non-medical: Not on file   Tobacco Use    Smoking status: Never Smoker    Smokeless tobacco: Never Used   Substance and Sexual Activity    Alcohol use: Never     Frequency: Never    Drug use: No    Sexual activity: Not Currently   Lifestyle    Physical activity     Days per week: Not on file     Minutes per session: Not on file    Stress: Not on file   Relationships    Social connections     Talks on phone: Not on file     Gets together: Not on file     Attends Scientologist service: Not on file     Active member of club or organization: Not on file     Attends meetings of clubs or organizations: Not on file     Relationship status: Not on file    Intimate partner violence     Fear of current or ex partner: Not on file     Emotionally abused: Not on file     Physically abused: Not on file     Forced sexual activity: Not on file   Other Topics Concern    Not on file   Social History Narrative    Not on file     Patient Pre-hospital Living Situation:  Permanent resident [facility]  Patient Pre-hospital Level of Mobility:   Patient Pre-hospital Diet Restrictions:  Regular diet    Family History:  Family History   Problem Relation Age of Onset    No Known Problems Mother     No Known Problems Father      Physical Exam:   Vitals:   Blood Pressure: 163/59 (05/31/21 1108)  Pulse: 71 (05/31/21 1108)  Temperature: 98 4 °F (36 9 °C) (05/31/21 1108)  Temp Source: Oral (05/31/21 1108)  Respirations: 20 (05/31/21 1108)  Weight - Scale: 70 8 kg (156 lb 1 4 oz) (05/31/21 0657)  SpO2: 97 % (05/31/21 1108)    Physical Exam  Vitals signs reviewed  Constitutional:       General: She is not in acute distress  Appearance: Normal appearance  HENT:      Head: Atraumatic  Eyes:      General: No scleral icterus  Extraocular Movements: Extraocular movements intact  Cardiovascular:      Rate and Rhythm: Regular rhythm  Heart sounds: Normal heart sounds  Pulmonary:      Breath sounds: Decreased breath sounds present  No wheezing  Abdominal:      General: Bowel sounds are normal       Palpations: Abdomen is soft  Tenderness: There is no guarding or rebound  Musculoskeletal:         General: No swelling  Skin:     General: Skin is warm  Neurological:      Mental Status: She is lethargic and disoriented  Psychiatric:         Speech: She is noncommunicative  Behavior: Behavior is slowed  Lab Results: I have personally reviewed pertinent reports      Results from last 7 days   Lab Units 05/31/21  0702   WBC Thousand/uL 9 21   HEMOGLOBIN g/dL 11 9   HEMATOCRIT % 34 4*   PLATELETS Thousands/uL 346   NEUTROS PCT % 81*   LYMPHS PCT % 10*   MONOS PCT % 8   EOS PCT % 0     Results from last 7 days   Lab Units 05/31/21  0702   SODIUM mmol/L 121*   POTASSIUM mmol/L 4 7   CHLORIDE mmol/L 87*   CO2 mmol/L 23   ANION GAP mmol/L 11   BUN mg/dL 14   CREATININE mg/dL 0 89   CALCIUM mg/dL 8 7   ALBUMIN g/dL 3 2*   TOTAL BILIRUBIN mg/dL 0 47   ALK PHOS U/L 209*   ALT U/L 32   AST U/L 30   EGFR ml/min/1 73sq m 57   GLUCOSE RANDOM mg/dL 127         Results from last 7 days   Lab Units 05/31/21  0702   TROPONIN I ng/mL <0 02             Results from last 7 days   Lab Units 05/31/21  0702 NT-PRO BNP pg/mL 578*      Results from last 7 days   Lab Units 05/31/21  0825   COLOR UA  Yellow   CLARITY UA  Cloudy   SPEC GRAV UA  1 015   PH UA  7 0   LEUKOCYTES UA  Large*   NITRITE UA  Positive*   GLUCOSE UA mg/dl Negative   KETONES UA mg/dl Negative   BILIRUBIN UA  Negative   BLOOD UA  Large*      Results from last 7 days   Lab Units 05/31/21  0825   RBC UA /hpf Innumerable*   WBC UA /hpf Innumerable*   EPITHELIAL CELLS WET PREP /hpf Occasional   BACTERIA UA /hpf Innumerable*          Imaging: I have personally reviewed pertinent films in PACS  Xr Chest 2 Views    Result Date: 5/31/2021  Impression: Trace left pleural effusion  Workstation performed: [id number]     Ct Head Without Contrast    Result Date: 5/31/2021  Impression: No acute intracranial abnormality  Microangiopathic changes  Workstation performed: [id number]       EKG, Pathology, and Other Studies Reviewed on Admission:   EKG  Result Date: 05/31/21  Personally reviewed strips with impression of:  Normal sinus rhythm 77 bpm    Allscripts/ Epic Records Reviewed: Yes    ** Please Note: This note has been constructed using a voice recognition system   **

## 2021-05-31 NOTE — ASSESSMENT & PLAN NOTE
Wt Readings from Last 3 Encounters:   05/31/21 70 8 kg (156 lb 1 4 oz)   11/14/19 67 6 kg (149 lb)   08/07/19 67 9 kg (149 lb 11 1 oz)     · Chronic diastolic CHF  Does have crackles bibasilar  Will continue furosemide for now

## 2021-05-31 NOTE — ED NOTES
Patient woke up during oral suction  Patient told is in the hospital and is going to be admitted  Patient asked why  explanation given   Patient showered understanding and then fell back asleep     Lex Aleman RN  05/31/21 [id number]

## 2021-05-31 NOTE — ASSESSMENT & PLAN NOTE
Wt Readings from Last 3 Encounters:   05/31/21 70 8 kg (156 lb 1 4 oz)   11/14/19 67 6 kg (149 lb)   08/07/19 67 9 kg (149 lb 11 1 oz)       -volume monitoring, diuresis as per primary team management

## 2021-05-31 NOTE — ED PROVIDER NOTES
History  Chief Complaint   Patient presents with    Altered Mental Status     patient with hx of alzheimers  per staff patient c/o facial pain last night  this morning patient was difficult to arouse, per staff approx 30 second seizure on arousal  patient currently responsive to painful stimuli  DNR     89y  o female with PMH of accelerated hypertension, acute encephalopathy, acute respiratory failure, AMS, depression, dysphagia, pacemaker placement, rhabdomyolysis and transient alteration of awareness presents to the ER for AMS  All history comes from nursing home staff at [facility] and patient's nephew Dlyan Paul  Last night patient was complaining of facial pain around her eyes and cheeks  This morning, staff states they witnessed a 35 second seizure where the patient's eyes were twitching and she was unresponsive  She has only been responding to painful stimuli since then  Per Dylan Paul, doctors at [facility] have been watching the patient's sodium levels, which have been low and they have been giving her Sodium tablets  Per Dylan Paul, over the last few weeks, patient has been increasingly confused  Nursing home staff deny any signs of infection  History provided by:  Nursing home  History limited by:  Mental status change   used: No        Prior to Admission Medications   Prescriptions Last Dose Informant Patient Reported? Taking?    Magnesium Hydroxide (MILK OF MAGNESIA PO)  Outside Facility (Specify) Yes Yes   Sig: Take 30 mL by mouth daily as needed   Sodium Phosphates (Enema Disposable) ENEM   Yes No   Sig: Insert 1 enema into the rectum   acetaminophen (TYLENOL) 650 mg suppository  Outside Facility (Specify) Yes Yes   Sig: Insert 650 mg into the rectum every 4 (four) hours as needed for mild pain or fever    amLODIPine (NORVASC) 2 5 mg tablet  Outside Facility (Specify) Yes Yes   Sig: Take 5 mg by mouth daily   bisacodyl (DULCOLAX) 10 mg suppository  Outside Facility (Specify) Yes Yes   Sig: Insert 10 mg into the rectum daily   ferrous sulfate 325 (65 Fe) mg tablet  Outside Facility (Specify) Yes No   Sig: Take 325 mg by mouth daily at bedtime   folic acid (FOLVITE) 1 mg tablet  Outside Facility (Specify) Yes Yes   Sig: Take 1 mg by mouth daily   furosemide (LASIX) 20 mg tablet  Outside Facility (Specify) No Yes   Sig: Take 1 tablet (20 mg total) by mouth every other day   metoprolol tartrate (LOPRESSOR) 25 mg tablet  Outside Facility (Specify) Yes Yes   Sig: Take 12 5 mg by mouth 2 (two) times a day   ondansetron (ZOFRAN) 4 mg tablet  Outside Facility (Specify) Yes Yes   Sig: Take 4 mg by mouth every 8 (eight) hours as needed for nausea or vomiting   polyethylene glycol (MIRALAX) 17 g packet  Outside Facility (Specify) No Yes   Sig: Take 17 g by mouth daily as needed (Constipation) for up to 30 days   sodium chloride 1 g tablet   Yes Yes   Sig: Take 1 g by mouth 3 (three) times a day      Facility-Administered Medications: None       Past Medical History:   Diagnosis Date    Abnormal urinalysis 8/3/2019    Accelerated hypertension 1/17/2017    Acute bronchopneumonia 8/3/2019    Acute encephalopathy 8/18/2017    Acute respiratory failure ([address] ) 8/3/2019    Altered mental status     Cough 5/15/2019    Depression     Diarrhea 10/14/2020    Dysphagia, oropharyngeal phase     Hypertension     Impacted cerumen of right ear 9/16/2019    Malaise 5/27/2020    Multiple thyroid nodules     Pacemaker     Rhabdomyolysis 1/17/2017    Sepsis ([address] ) 8/3/2019    Transient alteration of awareness     Urinary retention     Urinary tract infection 8/18/2017       Past Surgical History:   Procedure Laterality Date    CARDIAC PACEMAKER PLACEMENT         Family History   Problem Relation Age of Onset    No Known Problems Mother     No Known Problems Father      I have reviewed and agree with the history as documented      E-Cigarette/Vaping    E-Cigarette Use Never User      E-Cigarette/Vaping Substances Social History     Tobacco Use    Smoking status: Never Smoker    Smokeless tobacco: Never Used   Substance Use Topics    Alcohol use: Never     Frequency: Never    Drug use: No       Review of Systems   Unable to perform ROS: Mental status change       Physical Exam  Physical Exam  Vitals signs and nursing note reviewed  Constitutional:       General: She is not in acute distress  Appearance: She is ill-appearing  HENT:      Head: Normocephalic and atraumatic  Right Ear: Tympanic membrane, ear canal and external ear normal  No drainage, swelling or tenderness  No foreign body  No hemotympanum  Tympanic membrane is not erythematous  Left Ear: Tympanic membrane, ear canal and external ear normal  No drainage, swelling or tenderness  No foreign body  No hemotympanum  Tympanic membrane is not erythematous  Nose: Nose normal       Mouth/Throat:      Pharynx: Uvula midline  No posterior oropharyngeal erythema or uvula swelling  Tonsils: No tonsillar exudate or tonsillar abscesses  Eyes:      General: Lids are normal       Conjunctiva/sclera: Conjunctivae normal       Pupils: Pupils are equal, round, and reactive to light  Neck:      Musculoskeletal: Normal range of motion and neck supple  Trachea: No tracheal deviation  Cardiovascular:      Rate and Rhythm: Normal rate and regular rhythm  Heart sounds: Normal heart sounds, S1 normal and S2 normal  No murmur  No friction rub  No gallop  Pulmonary:      Effort: Pulmonary effort is normal  No respiratory distress  Breath sounds: Normal breath sounds  No decreased breath sounds, wheezing, rhonchi or rales  Chest:      Chest wall: No tenderness  Abdominal:      General: Bowel sounds are normal  There is no distension  Palpations: Abdomen is soft  Tenderness: There is no abdominal tenderness  There is no guarding or rebound  Musculoskeletal: Normal range of motion  General: No deformity  Cervical back: Normal       Thoracic back: Normal       Lumbar back: Normal    Skin:     General: Skin is warm and dry  Findings: No rash  Neurological:      Mental Status: She is lethargic  Motor: No tremor  Vital Signs  ED Triage Vitals   Temperature Pulse Respirations Blood Pressure SpO2   05/31/21 0657 05/31/21 0657 05/31/21 0657 05/31/21 0657 05/31/21 0657   97 6 °F (36 4 °C) 73 18 109/74 92 %      Temp Source Heart Rate Source Patient Position - Orthostatic VS BP Location FiO2 (%)   05/31/21 0657 05/31/21 0807 05/31/21 0807 05/31/21 0807 --   Axillary Monitor Lying Left arm       Pain Score       --                  Vitals:    05/31/21 0830 05/31/21 0930 05/31/21 1030 05/31/21 1108   BP: 151/70 162/53 142/63 163/59   Pulse: 70 72 68 71   Patient Position - Orthostatic VS:    Lying         Visual Acuity  Visual Acuity      Most Recent Value   L Pupil Size (mm)  2   R Pupil Size (mm)  2          ED Medications  Medications   sodium chloride 0 9 % bolus 1,000 mL (0 mL Intravenous Stopped 5/31/21 0925)   ceftriaxone (ROCEPHIN) 1 g/50 mL in dextrose IVPB (0 mg Intravenous Stopped 5/31/21 0934)       Diagnostic Studies  Results Reviewed     Procedure Component Value Units Date/Time    NT-BNP PRO [[id number]]  (Abnormal) Collected: 05/31/21 0702    Lab Status: Final result Specimen: Blood from Arm, Right Updated: 05/31/21 0949     NT-proBNP 578 pg/mL     Novel Coronavirus (Covid-19),PCR [facility] - 2 Hour Stat [[id number]]  (Normal) Collected: 05/31/21 0800    Lab Status: Final result Specimen: Nares from Nasopharyngeal Swab Updated: 05/31/21 0945     SARS-CoV-2 Negative    Narrative: The specimen collection materials, transport medium, and/or testing methodology utilized in the production of these test results have been proven to be reliable in a limited validation with an abbreviated program under the Emergency Utilization Authorization provided by the FDA    Testing reported as "Presumptive positive" will be confirmed with secondary testing to ensure result accuracy  Clinical caution and judgement should be used with the interpretation of these results with consideration of the clinical impression and other laboratory testing  Testing reported as "Positive" or "Negative" has been proven to be accurate according to standard laboratory validation requirements  All testing is performed with control materials showing appropriate reactivity at standard intervals  Urine Microscopic [[id number]]  (Abnormal) Collected: 05/31/21 0825    Lab Status: Final result Specimen: Urine, Indwelling Bergeron Catheter Updated: 05/31/21 0908     RBC, UA Innumerable /hpf      WBC, UA Innumerable /hpf      Epithelial Cells Occasional /hpf      Bacteria, UA Innumerable /hpf     Urine culture [[id number]] Collected: 05/31/21 0825    Lab Status:  In process Specimen: Urine, Indwelling Bergeron Catheter Updated: 05/31/21 0908    Urine Macroscopic, POC [[id number]]  (Abnormal) Collected: 05/31/21 0825    Lab Status: Final result Specimen: Urine Updated: 05/31/21 0826     Color, UA Yellow     Clarity, UA Cloudy     pH, UA 7 0     Leukocytes, UA Large     Nitrite, UA Positive     Protein,  (2+) mg/dl      Glucose, UA Negative mg/dl      Ketones, UA Negative mg/dl      Urobilinogen, UA 1 0 E U /dl      Bilirubin, UA Negative     Blood, UA Large     Specific [place], UA 1 015    Narrative:      CLINITEK RESULT    Comprehensive metabolic panel [[id number]]  (Abnormal) Collected: 05/31/21 0702    Lab Status: Final result Specimen: Blood from Arm, Right Updated: 05/31/21 0739     Sodium 121 mmol/L      Potassium 4 7 mmol/L      Chloride 87 mmol/L      CO2 23 mmol/L      ANION GAP 11 mmol/L      BUN 14 mg/dL      Creatinine 0 89 mg/dL      Glucose 127 mg/dL      Calcium 8 7 mg/dL      Corrected Calcium 9 3 mg/dL      AST 30 U/L      ALT 32 U/L      Alkaline Phosphatase 209 U/L      Total Protein 7 1 g/dL      Albumin 3 2 g/dL Total Bilirubin 0 47 mg/dL      eGFR 57 ml/min/1 73sq m     Narrative:      [place] guidelines for Chronic Kidney Disease (CKD):     Stage 1 with normal or high GFR (GFR > 90 mL/min/1 73 square meters)    Stage 2 Mild CKD (GFR = 60-89 mL/min/1 73 square meters)    Stage 3A Moderate CKD (GFR = 45-59 mL/min/1 73 square meters)    Stage 3B Moderate CKD (GFR = 30-44 mL/min/1 73 square meters)    Stage 4 Severe CKD (GFR = 15-29 mL/min/1 73 square meters)    Stage 5 End Stage CKD (GFR <15 mL/min/1 73 square meters)  Note: GFR calculation is accurate only with a steady state creatinine    Troponin I [[id number]]  (Normal) Collected: 05/31/21 0702    Lab Status: Final result Specimen: Blood from Arm, Right Updated: 05/31/21 0726     Troponin I <0 02 ng/mL     CBC and differential [[id number]]  (Abnormal) Collected: 05/31/21 0702    Lab Status: Final result Specimen: Blood from Arm, Right Updated: 05/31/21 0709     WBC 9 21 Thousand/uL      RBC 3 66 Million/uL      Hemoglobin 11 9 g/dL      Hematocrit 34 4 %      MCV 94 fL      MCH 32 5 pg      MCHC 34 6 g/dL      RDW 12 7 %      MPV 8 4 fL      Platelets 332 Thousands/uL      nRBC 0 /100 WBCs      Neutrophils Relative 81 %      Immat GRANS % 1 %      Lymphocytes Relative 10 %      Monocytes Relative 8 %      Eosinophils Relative 0 %      Basophils Relative 0 %      Neutrophils Absolute 7 43 Thousands/µL      Immature Grans Absolute 0 07 Thousand/uL      Lymphocytes Absolute 0 93 Thousands/µL      Monocytes Absolute 0 71 Thousand/µL      Eosinophils Absolute 0 04 Thousand/µL      Basophils Absolute 0 03 Thousands/µL                  CT head without contrast   Final Result by Radha 6, DO (05/31 1941)      No acute intracranial abnormality  Microangiopathic changes                    Workstation performed: [id number]         XR chest 2 views   ED Interpretation by Kerwin Hanson PA-C (05/31 0830)   No acute cardiopulmonary findings seen by me at this time  Final Result by Carito Campos DO (05/31 9470)      Trace left pleural effusion  Workstation performed: [id number]                    Procedures  ECG 12 Lead Documentation Only    Date/Time: 5/31/2021 6:53 AM  Performed by: Jens Godinez PA-C  Authorized by: Jens Godinez PA-C     Indications / Diagnosis:  AMS  ECG reviewed by me, the ED Provider: yes (Also reviewed by Dr Wendy Urrutia)    Patient location:  ED  Previous ECG:     Previous ECG:  Compared to current    Similarity:  No change  Interpretation:     Interpretation: abnormal    Rate:     ECG rate:  77    ECG rate assessment: normal    Rhythm:     Rhythm: sinus rhythm and A-V block    Ectopy:     Ectopy: none    QRS:     QRS intervals:  Normal  ST segments:     ST segments:  Normal  T waves:     T waves: inverted      Inverted:  AVR and V1             ED Course  ED Course as of May 31 1129   Mon May 31, 2021   0728 Spoke with staff at [facility]  Had a seizure this morning lasted about 35 seconds eyes twitching  Normally alert and oriented  Not normally contracted  [address] with Heather Lane who is her nephew  Been very confused for the last 2-3 weeks  Has happened in the past when sodium levels were low  He last saw her last week  She is a DNR/DNI       [id number] Giving fluids  Was seen on 5/17/21 and appearance seems similar to then  Sodium(!): 121   0848 Will give Rocephin for UTI  Urine Macroscopic, POC(!)   [id number] SARS-COV-2: Negative   0956 Tiger text sent to [facility] for admission  [address] [facility] with Dr Rangel Diego  Will admit                                 SBIRT 20yo+      Most Recent Value   SBIRT (22 yo +)   In order to provide better care to our patients, we are screening all of our patients for alcohol and drug use  Would it be okay to ask you these screening questions?   Unable to answer at this time Filed at: 05/31/2021 0913                    [facility]  Number of Diagnoses or Management Options  Hyponatremia: new and requires workup  Pleural effusion on left: new and requires workup  UTI (urinary tract infection): new and requires workup  Diagnosis management comments: DDX consists of but not limited to: electrolyte abnormality, infection, CVA    Will check labs and imaging  Lobito 6 with staff at [facility]  Staff believes the patient had a seizure this morning lasted about 35 seconds  They do not report total body shaking but said her eyes were twitching  Normally patient is alert and oriented  Not normally contracted  [address] with Yevgeniy Marmolejo who is her nephew and POA  Patient has been very confused for the last 2-3 weeks  This has happened in the past when sodium levels were low  He last saw her last week  She is a DNR/DNI      [id number] Giving fluids  Was seen on 5/17/21 and appearance seems similar to then  - Sodium(!): 121    0848 Will give Rocephin for UTI  - Urine Macroscopic, POC(!)    [id number] SARS-COV-2: Negative    [id number] [place]oles text sent to [address] [place] [address] for admission  [address] [facility] with Dr Last Talbot  Will admit  Patient's mental status is improving  She is now speaking and more arousable  Patient stable at time of transfer to [address] [place] [address] care             Amount and/or Complexity of Data Reviewed  Clinical lab tests: ordered and reviewed  Tests in the radiology section of CPT®: ordered and reviewed  Obtain history from someone other than the patient: yes  Discuss the patient with other providers: yes  Independent visualization of images, tracings, or specimens: yes    Patient Progress  Patient progress: stable      Disposition  Final diagnoses:   Hyponatremia   UTI (urinary tract infection)   Pleural effusion on left     Time reflects when diagnosis was documented in both MDM as applicable and the Disposition within this note     Time User Action Codes Description Comment    5/31/2021 10:06 AM Argelia Porras A Add [E87 1] Hyponatremia     5/31/2021 10:06 AM Argelia Liming A Add [N39 0] UTI (urinary tract infection)     5/31/2021 10:07 AM Lexie BELTRE Add [J90] Recurrent left pleural effusion     5/31/2021 10:07 AM Lexie BELTRE Remove [J90] Recurrent left pleural effusion     5/31/2021 10:07 AM Lexie BELTRE Add [J90] Pleural effusion on left       ED Disposition     ED Disposition Condition Date/Time Comment    Admit Stable Mon May 31, 2021 10:07 AM Case was discussed with Dr Bo Calderon from [facility] and the patient's admission status was agreed to be Admission Status: inpatient status to the service of Dr Bo Calderon   Follow-up Information    None         Current Discharge Medication List      CONTINUE these medications which have NOT CHANGED    Details   acetaminophen (TYLENOL) 650 mg suppository Insert 650 mg into the rectum every 4 (four) hours as needed for mild pain or fever       amLODIPine (NORVASC) 2 5 mg tablet Take 5 mg by mouth daily      bisacodyl (DULCOLAX) 10 mg suppository Insert 10 mg into the rectum daily      folic acid (FOLVITE) 1 mg tablet Take 1 mg by mouth daily      furosemide (LASIX) 20 mg tablet Take 1 tablet (20 mg total) by mouth every other day  Qty: 30 tablet, Refills: 0    Associated Diagnoses: Acute diastolic congestive heart failure (HCC)      Magnesium Hydroxide (MILK OF MAGNESIA PO) Take 30 mL by mouth daily as needed      metoprolol tartrate (LOPRESSOR) 25 mg tablet Take 12 5 mg by mouth 2 (two) times a day      ondansetron (ZOFRAN) 4 mg tablet Take 4 mg by mouth every 8 (eight) hours as needed for nausea or vomiting      polyethylene glycol (MIRALAX) 17 g packet Take 17 g by mouth daily as needed (Constipation) for up to 30 days  Qty: 510 g, Refills: 0      sodium chloride 1 g tablet Take 1 g by mouth 3 (three) times a day      ferrous sulfate 325 (65 Fe) mg tablet Take 325 mg by mouth daily at bedtime      Sodium Phosphates (Enema Disposable) ENEM Insert 1 enema into the rectum           No discharge procedures on file      PDMP Review     None          ED Provider  Electronically Signed by           Silvino Rocha PA-C  05/31/21 1138

## 2021-05-31 NOTE — CONSULTS
Consultation - Neurology   Michelle Benton 80 y o  female MRN: [id number]  Unit/Bed#: E5 -01 Encounter: [id number]      Assessment/Plan   80-year-old female with history of Alzheimer's type dementia, chronic diastolic CHF, hypertension, presenting with a brief episode of seizure-like activity followed by poorly responsive state at [facility] this morning on 05/31  Patient found to be hyponatremic to 121 and have a suspected UTI  Concern for provoked seizure in the setting of hyponatremia and UTI superimposed on underlying Alzheimer's type dementia with limited cognitive reserve  Proceed as follows: * Witnessed seizure-like activity ([facility] [address] )  Assessment & Plan  - Concern for seizure activity this morning with 30 second episode of eyelid fluttering/twitching followed by unresponsive state  - CT head negative for acute intracranial pathology  - Possibly provoked in the setting of setting of hyponatremia and possible UTI  - Obtain EEG  - Seizure precautions  - Ativan 2mg for motor seizure > 2minutes  - Hold off on initiating AED as this episode was likely provoked, and there have been no reported episodes since 2017        Hyponatremia  Assessment & Plan  -notable hyponatremia on admission this morning at 121  -nephrology following, appreciate input  -serial BMP monitoring  -salt tabs 3 times daily    UTI (urinary tract infection)  Assessment & Plan  - Suspected based on UA  - On Rocephin  - management as per primary team    Dementia [facility])  Assessment & Plan  - Supportive care  - Optimize sleep/wake cycles  - Avoid CNS altering medications    Chronic diastolic congestive heart failure (HCC)  Assessment & Plan  Wt Readings from Last 3 Encounters:   05/31/21 70 8 kg (156 lb 1 4 oz)   11/14/19 67 6 kg (149 lb)   08/07/19 67 9 kg (149 lb 11 1 oz)       -volume monitoring, diuresis as per primary team management          Recommendations for outpatient neurological follow up have yet to be determined  History of Present Illness     Reason for Consult / Principal Problem:  Seizure-like activity  Hx and PE limited by: Dementia  HPI: Damir Cantu is a 80 y o  female with a history of Azheimer's dementia, pacemaker, HTN, Diastolic CHF and two prior episodes of unresponsiveness in 2017 who presented to the ED after witnessed seizure like activity  History is obtained primarily via chart review as patient with Alzheimer's type dementia  ED provider spoke with patient's nephew this morning, who states over the past several weeks she has been more confused and has been dealing with hyponatremia of lately at her nursing facility at [facility]  This morning, staff noted approximately 30 second episode were patient's eyes were twitching and she was unresponsive  Following that episode she was poorly arousable  Patient remained this was in the ED and on the floor until my exam when she slowly began to interact  Her workup beginning this morning included afebrile state, otherwise seemingly normal vitals, chem profile noted significant hyponatremia at 121, BMP of 578, negative troponin, no white count  UA was grossly abnormal with nitrite/leukocyte, innumerable white cells and bacteria (awaiting cultures)  Chest x-ray with trace left pleural effusion, COVID negative  CT head negative for acute pathology  Seen by neuro hospitalist team in [facility] August 2017 after episode of unresponsiveness:  Differential included toxic metabolic encephalopathy, decreased hearing loss superimposed on dementia with poor cognitive reserve  Also had prior admission in early 2017 for unresponsiveness with intubation and continuous EEG monitoring negative for any epileptiform abnormalities or electrographic seizures  Today on exam, patient awoke and began to interact  She was able to state her name and follow simple commands, but was amnestic to the prior events   She initially denied pain, but on serial exam noted tongue/mouth discomfort  Inpatient consult to Neurology  Consult performed by: Elliot Anderson PA-C  Consult ordered by: Livan Turcios DO        Review of Systems   Unable to perform ROS: Dementia       Historical Information   Past Medical History:   Diagnosis Date    Acute bronchopneumonia 8/3/2019    Acute encephalopathy 8/18/2017    Acute respiratory failure ([facility] [address] ) 8/3/2019    Altered mental status     Depression     Diastolic CHF, chronic (HCC)     Dysphagia, oropharyngeal phase     Essential hypertension 01/17/2017    Hypertension     Impacted cerumen of right ear 9/16/2019    Malaise 5/27/2020    Multiple thyroid nodules     Pacemaker     Rhabdomyolysis 1/17/2017    Sepsis ([facility] [address] ) 8/3/2019    Urinary retention     Urinary tract infection 8/18/2017     Past Surgical History:   Procedure Laterality Date    CARDIAC PACEMAKER PLACEMENT       Social History   Social History     Substance and Sexual Activity   Alcohol Use Never    Frequency: Never     Social History     Substance and Sexual Activity   Drug Use No     E-Cigarette/Vaping    E-Cigarette Use Never User      E-Cigarette/Vaping Substances     Social History     Tobacco Use   Smoking Status Never Smoker   Smokeless Tobacco Never Used     Family History:   Family History   Problem Relation Age of Onset    No Known Problems Mother     No Known Problems Father        Review of previous medical records was completed  Meds/Allergies   all current active meds have been reviewed    No Known Allergies    Objective   Vitals:Blood pressure 149/65, pulse 78, temperature 97 6 °F (36 4 °C), resp  rate 18, weight 70 8 kg (156 lb 1 4 oz), SpO2 97 %, not currently breastfeeding  ,Body mass index is 25 19 kg/m²  Intake/Output Summary (Last 24 hours) at 5/31/2021 1834  Last data filed at 5/31/2021 1453  Gross per 24 hour   Intake 1250 ml   Output 650 ml   Net 600 ml       Invasive Devices:    Invasive Devices     Peripheral Intravenous Line            Peripheral IV 05/31/21 Right Wrist less than 1 day    Peripheral IV 05/31/21 Ventral (anterior); Left Hand less than 1 day          Drain            Urethral Catheter Latex 18 Fr  507 days                Physical Exam  Vitals signs reviewed  Constitutional:       General: She is not in acute distress  Appearance: Normal appearance  She is well-developed  She is ill-appearing  She is not toxic-appearing or diaphoretic  Comments: Elderly, seating upright in bed   HENT:      Head: Normocephalic and atraumatic  Mouth/Throat:      Comments: Macroglossia with drooling  Eyes:      General: No scleral icterus  Right eye: No discharge  Left eye: No discharge  Extraocular Movements: Extraocular movements intact  Conjunctiva/sclera: Conjunctivae normal       Pupils: Pupils are equal, round, and reactive to light  Neck:      Musculoskeletal: Normal range of motion and neck supple  Cardiovascular:      Rate and Rhythm: Normal rate and regular rhythm  Pulmonary:      Effort: Pulmonary effort is normal  No respiratory distress  Breath sounds: No stridor  Musculoskeletal:         General: No tenderness or deformity  Skin:     General: Skin is warm and dry  Findings: No erythema or rash  Neurological:      Mental Status: She is alert  Coordination: Finger-Nose-Finger Test normal       Comments: cooperative       Neurologic Exam     Mental Status   Patient awoke upon my arrival   She was able to maintain an awake state to most of my exam though occasionally closed her eyes for prolonged periods of time  She is oriented to herself but otherwise disoriented  She is amnestic to the events that brought her to the hospital   She could follow one-step commands  Speech was dysarthric in the setting of her very enlarged tongue  She was noted to be drooling as well    Patient was able to name specific objects and imitate the amount of fingers examiner was holding up  Cranial Nerves     CN III, IV, VI   Pupils are equal, round, and reactive to light  Gaze is conjugate  Able to count fingers bilaterally with glasses  Blink to threat symmetrically  EOMs are intact  No gross facial asymmetry noted  Sensation intact symmetrically  Tongue protrudes significantly but midline  Motor Exam   Muscle bulk: normal  Overall muscle tone: normal  BUEs:  Symmetric  Approximately 3/3- as patient unable to maintain antigravity position for any notable duration  Biceps and  strength 5 bilaterally  Unable to test triceps due to lack of patient understanding  BLEs:  Can wiggle toes and kick legs bilaterally and symmetrically  Unable to lift bilateral lower extremities off the bed  Sensory Exam   Patient acknowledges feeling light touch symmetrically in all extremities  Gait, Coordination, and Reflexes     Coordination   Finger to nose coordination: normal    Reflexes   Right plantar: upgoing  Left plantar: upgoing  DTRs:  Absent to trace throughout       Lab Results: I have personally reviewed pertinent reports  Imaging Studies: I have personally reviewed pertinent films in PACS  EKG, Pathology, and Other Studies: I have personally reviewed pertinent reports      VTE Prophylaxis: Heparin    Code Status: Level 3 - DNAR and DNI

## 2021-06-01 LAB
ALBUMIN SERPL BCP-MCNC: 2.8 G/DL (ref 3.5–5)
ALP SERPL-CCNC: 179 U/L (ref 46–116)
ALT SERPL W P-5'-P-CCNC: 27 U/L (ref 12–78)
ANION GAP SERPL CALCULATED.3IONS-SCNC: 9 MMOL/L (ref 4–13)
AST SERPL W P-5'-P-CCNC: 36 U/L (ref 5–45)
BILIRUB SERPL-MCNC: 0.55 MG/DL (ref 0.2–1)
BUN SERPL-MCNC: 9 MG/DL (ref 5–25)
CALCIUM ALBUM COR SERPL-MCNC: 9.3 MG/DL (ref 8.3–10.1)
CALCIUM SERPL-MCNC: 8.3 MG/DL (ref 8.3–10.1)
CHLORIDE SERPL-SCNC: 96 MMOL/L (ref 100–108)
CO2 SERPL-SCNC: 22 MMOL/L (ref 21–32)
CORTIS AM PEAK SERPL-MCNC: 24.4 UG/DL (ref 4.2–22.4)
CREAT SERPL-MCNC: 0.68 MG/DL (ref 0.6–1.3)
ERYTHROCYTE [DISTWIDTH] IN BLOOD BY AUTOMATED COUNT: 13 % (ref 11.6–15.1)
GFR SERPL CREATININE-BSD FRML MDRD: 77 ML/MIN/1.73SQ M
GLUCOSE SERPL-MCNC: 92 MG/DL (ref 65–140)
HCT VFR BLD AUTO: 34 % (ref 34.8–46.1)
HGB BLD-MCNC: 11.3 G/DL (ref 11.5–15.4)
MCH RBC QN AUTO: 32.2 PG (ref 26.8–34.3)
MCHC RBC AUTO-ENTMCNC: 33.2 G/DL (ref 31.4–37.4)
MCV RBC AUTO: 97 FL (ref 82–98)
OSMOLALITY UR/SERPL-RTO: 262 MMOL/KG (ref 282–298)
PLATELET # BLD AUTO: 329 THOUSANDS/UL (ref 149–390)
PMV BLD AUTO: 8.4 FL (ref 8.9–12.7)
POTASSIUM SERPL-SCNC: 3.6 MMOL/L (ref 3.5–5.3)
PROT SERPL-MCNC: 6.3 G/DL (ref 6.4–8.2)
RBC # BLD AUTO: 3.51 MILLION/UL (ref 3.81–5.12)
SODIUM SERPL-SCNC: 127 MMOL/L (ref 136–145)
SODIUM SERPL-SCNC: 129 MMOL/L (ref 136–145)
URATE SERPL-MCNC: 3.5 MG/DL (ref 2–6.8)
WBC # BLD AUTO: 8.53 THOUSAND/UL (ref 4.31–10.16)

## 2021-06-01 PROCEDURE — 80053 COMPREHEN METABOLIC PANEL: CPT | Performed by: INTERNAL MEDICINE

## 2021-06-01 PROCEDURE — 82533 TOTAL CORTISOL: CPT | Performed by: NURSE PRACTITIONER

## 2021-06-01 PROCEDURE — 84550 ASSAY OF BLOOD/URIC ACID: CPT | Performed by: NURSE PRACTITIONER

## 2021-06-01 PROCEDURE — 92610 EVALUATE SWALLOWING FUNCTION: CPT

## 2021-06-01 PROCEDURE — 99232 SBSQ HOSP IP/OBS MODERATE 35: CPT | Performed by: INTERNAL MEDICINE

## 2021-06-01 PROCEDURE — 85027 COMPLETE CBC AUTOMATED: CPT | Performed by: INTERNAL MEDICINE

## 2021-06-01 PROCEDURE — 84295 ASSAY OF SERUM SODIUM: CPT | Performed by: NURSE PRACTITIONER

## 2021-06-01 PROCEDURE — 83930 ASSAY OF BLOOD OSMOLALITY: CPT | Performed by: NURSE PRACTITIONER

## 2021-06-01 RX ORDER — FUROSEMIDE 10 MG/ML
10 INJECTION INTRAMUSCULAR; INTRAVENOUS ONCE
Status: COMPLETED | OUTPATIENT
Start: 2021-06-01 | End: 2021-06-01

## 2021-06-01 RX ORDER — SODIUM CHLORIDE 1000 MG
1 TABLET, SOLUBLE MISCELLANEOUS
Status: DISCONTINUED | OUTPATIENT
Start: 2021-06-01 | End: 2021-06-01

## 2021-06-01 RX ORDER — SODIUM CHLORIDE 1000 MG
1 TABLET, SOLUBLE MISCELLANEOUS
Status: DISCONTINUED | OUTPATIENT
Start: 2021-06-01 | End: 2021-06-03

## 2021-06-01 RX ADMIN — Medication 12.5 MG: at 09:03

## 2021-06-01 RX ADMIN — HEPARIN SODIUM 5000 UNITS: 5000 INJECTION INTRAVENOUS; SUBCUTANEOUS at 06:21

## 2021-06-01 RX ADMIN — Medication 12.5 MG: at 22:36

## 2021-06-01 RX ADMIN — SODIUM CHLORIDE TAB 1 GM 1 G: 1 TAB at 09:03

## 2021-06-01 RX ADMIN — SODIUM CHLORIDE TAB 1 GM 1 G: 1 TAB at 11:56

## 2021-06-01 RX ADMIN — AMLODIPINE BESYLATE 5 MG: 5 TABLET ORAL at 09:03

## 2021-06-01 RX ADMIN — FUROSEMIDE 10 MG: 10 INJECTION, SOLUTION INTRAVENOUS at 14:12

## 2021-06-01 RX ADMIN — CEFTRIAXONE SODIUM 1000 MG: 10 INJECTION, POWDER, FOR SOLUTION INTRAVENOUS at 09:03

## 2021-06-01 RX ADMIN — HEPARIN SODIUM 5000 UNITS: 5000 INJECTION INTRAVENOUS; SUBCUTANEOUS at 13:35

## 2021-06-01 RX ADMIN — HEPARIN SODIUM 5000 UNITS: 5000 INJECTION INTRAVENOUS; SUBCUTANEOUS at 22:32

## 2021-06-01 NOTE — ASSESSMENT & PLAN NOTE
· UTI likely contributing to acute metabolic encephalopathy    Continue ceftriaxone until cultures available

## 2021-06-01 NOTE — PROGRESS NOTES
NEPHROLOGY PROGRESS NOTE   Hans Valdez 80 y o  female MRN: [id number]  Unit/Bed#: E5 -01 Encounter: [id number]  Reason for Consult: Hyponatremia    PLAN:   -hyponatremia, sodium improved from 121-127 this morning, correcting appropriately  IV fluids discontinued this morning  Continues on salt tabs, increased to 4 times per day  Placed on fluid restriction  Lasix remains on hold  Will check sodium level later today with calling parameters   -seizure-like activity, neurology following  CT negative for acute abnormality  -urinary tract infection, continues on antibiotics per primary care team   -chronic diastolic heart failure, diuretics on hold  May give Lasix as needed  -hypertension, blood pressure overall acceptable, continues on amlodipine and metoprolol  ASSESSMENT/PLAN:  Hyponatremia:  suspected component of hypovolemia as well as SIADH  -  Presents with sodium level of 121   -  Na level correcting to 127 this morning, correcting appropriately  -  previously on NSS, now discontinued this morning    -  Continue on sodium chloride tablets 1 g 3 times per day  Will increase to 4 x per day  -  Placed on fluid restriction    -  Lasix on hold, will give as needed  -  Serum osmolality 262, urine osmolality 490, urine sodium 71, TSH 3 09, a m  cortisol pending, and uric acid level 3 5   -  Avoid rapid correction, goal to correct 6-8 mEq in 24 hours  -  Check Na level later today, calling parameters placed  -  Baseline creatinine 0 6-0 8, creatinine currently at baseline        Seizure-like activity:  Suspected secondary to hyponatremia   - Neurology consulted, continues on seizure precautions  - CT head negative for acute intracranial abnormality      Gram-negative joyce UTI:  -  UA showed large blood, positive nitrites, innumerable WBCs , RBCs, and bacteria    -  Continues on antibiotics        Chronic diastolic CHF:   Most recent echocardiogram shows EF of 70% and grade 1 diastolic dysfunction   -  Chest x-ray shows trace left pleural effusion   -  Continue daily weights, fluid restriction, I/O   -  OP diuretic: Lasix 20 mg every other day  -  May give Lasix as needed         Hypertension:  Overall acceptable for age  -  Continue on amlodipine 5 mg po daily and metoprolol 12 5 mg every 12 hours  -  Holding parameters adjusted for SBP <130 mmHg         Other: Dementia    Disposition:  requiring additional stay due to medical needs  SUBJECTIVE:  The patient is demented at baseline  She is awake and alert this morning      OBJECTIVE:  Current Weight: Weight - Scale: 70 8 kg (156 lb 1 4 oz)  Vitals:    05/31/21 1527 05/31/21 2100 05/31/21 2342 06/01/21 0742   BP: 149/65 150/65 154/71 150/66   BP Location:       Pulse: 78 80 75 80   Resp: 18  18 17   Temp: 97 6 °F (36 4 °C)  98 7 °F (37 1 °C) 98 8 °F (37 1 °C)   TempSrc:   Temporal    SpO2: 97% 98% 96% 97%   Weight:           Intake/Output Summary (Last 24 hours) at 6/1/2021 1027  Last data filed at 6/1/2021 0345  Gross per 24 hour   Intake 0 ml   Output 1050 ml   Net -1050 ml     General: NAD  Skin: warm, dry, intact, no rash  HEENT: Moist mucous membranes, sclera anicteric, normocephalic, atraumatic  Neck: No apparent JVD appreciated  Chest: lung sounds clear B/L, on RA   CVS:Regular rate and rhythm, no murmer   Abdomen: Soft, round, non-tender, +BS  Extremities: No B/L LE edema present  Neuro: alert , demented at baseline  Psych: appropriate mood and affect     Medications:    Current Facility-Administered Medications:     acetaminophen (TYLENOL) tablet 650 mg, 650 mg, Oral, Q6H PRN, Cliff Galloway DO    amLODIPine (NORVASC) tablet 5 mg, 5 mg, Oral, Daily, KADE Wilson, 5 mg at 06/01/21 0903    cefTRIAXone (ROCEPHIN) 1,000 mg in dextrose 5 % 50 mL IVPB, 1,000 mg, Intravenous, Q24H, Cliff Galloway DO, Last Rate: 100 mL/hr at 06/01/21 0903, 1,000 mg at 06/01/21 0903    heparin (porcine) subcutaneous injection 5,000 Units, 5,000 Units, Subcutaneous, Q8H [address], Cliff Nilesh, DO, 5,000 Units at 06/01/21 0621    metoprolol tartrate (LOPRESSOR) partial tablet 12 5 mg, 12 5 mg, Oral, Q12H [address], Cliff Nilesh, DO, 12 5 mg at 06/01/21 0903    ondansetron (ZOFRAN) injection 4 mg, 4 mg, Intravenous, Q4H PRN, Mandie Martinez DO    sodium chloride tablet 1 g, 1 g, Oral, TID, Cliff Galloway, DO, 1 g at 06/01/21 1016    Laboratory Results:  Results from last 7 days   Lab Units 06/01/21  0711 05/31/21  2237 05/31/21  1424 05/31/21  0702   WBC Thousand/uL 8 53  --   --  9 21   HEMOGLOBIN g/dL 11 3*  --   --  11 9   HEMATOCRIT % 34 0*  --   --  34 4*   PLATELETS Thousands/uL 329  --   --  346   SODIUM mmol/L 127* 126* 122* 121*   POTASSIUM mmol/L 3 6 3 8 5 0 4 7   CHLORIDE mmol/L 96* 92* 91* 87*   CO2 mmol/L 22 22 23 23   BUN mg/dL 9 11 11 14   CREATININE mg/dL 0 68 0 69 0 58* 0 89   CALCIUM mg/dL 8 3 8 5 8 2* 8 7   ALK PHOS U/L 179*  --   --  209*   ALT U/L 27  --   --  32   AST U/L 36  --   --  30

## 2021-06-01 NOTE — ASSESSMENT & PLAN NOTE
Wt Readings from Last 3 Encounters:   05/31/21 70 8 kg (156 lb 1 4 oz)   11/14/19 67 6 kg (149 lb)   08/07/19 67 9 kg (149 lb 11 1 oz)     · Chronic diastolic CHF  Does have crackles bibasilar  Currently furosemide on hold due to hyponatremia  · DC further IV fluids and will discuss with Nephrology regarding further recommendations

## 2021-06-01 NOTE — ASSESSMENT & PLAN NOTE
· Seizure-like activity at nursing facility  Likely secondary to hyponatremia  · Present on admission acute metabolic encephalopathy from hyponatremia vs postictal state  · Possible UTI:  Continue empiric ceftriaxone and follow urine culture  · Seizure precautions and appreciate neurology evaluation  For EEG

## 2021-06-01 NOTE — PLAN OF CARE
Problem: Potential for Falls  Goal: Patient will remain free of falls  Description: INTERVENTIONS:  - Assess patient frequently for physical needs  -  Identify cognitive and physical deficits and behaviors that affect risk of falls    -  [place] fall precautions as indicated by assessment   - Educate patient/family on patient safety including physical limitations  - Instruct patient to call for assistance with activity based on assessment  - Modify environment to reduce risk of injury  - Consider OT/PT consult to assist with strengthening/mobility  Outcome: Progressing     Problem: Prexisting or High Potential for Compromised Skin Integrity  Goal: Skin integrity is maintained or improved  Description: INTERVENTIONS:  - Identify patients at risk for skin breakdown  - Assess and monitor skin integrity  - Assess and monitor nutrition and hydration status  - Monitor labs   - Assess for incontinence   - Turn and reposition patient  - Assist with mobility/ambulation  - Relieve pressure over bony prominences  - Avoid friction and shearing  - Provide appropriate hygiene as needed including keeping skin clean and dry  - Evaluate need for skin moisturizer/barrier cream  - Collaborate with interdisciplinary team   - Patient/family teaching  - Consider wound care consult   Outcome: Progressing     Problem: NEUROSENSORY - ADULT  Goal: Achieves stable or improved neurological status  Description: INTERVENTIONS  - Monitor and report changes in neurological status  - Monitor vital signs such as temperature, blood pressure, glucose, and any other labs ordered   - Initiate measures to prevent increased intracranial pressure  - Monitor for seizure activity and implement precautions if appropriate      Outcome: Progressing  Goal: Remains free of injury related to seizures activity  Description: INTERVENTIONS  - Maintain airway, patient safety  and administer oxygen as ordered  - Monitor patient for seizure activity, document and report duration and description of seizure to physician/advanced practitioner  - If seizure occurs,  ensure patient safety during seizure  - Reorient patient post seizure  - Seizure pads on all 4 side rails  - Instruct patient/family to notify RN of any seizure activity including if an aura is experienced  - Instruct patient/family to call for assistance with activity based on nursing assessment  - Administer anti-seizure medications if ordered    Outcome: Progressing  Goal: Achieves maximal functionality and self care  Description: INTERVENTIONS  - Monitor swallowing and airway patency with patient fatigue and changes in neurological status  - Encourage and assist patient to increase activity and self care     - Encourage visually impaired, hearing impaired and aphasic patients to use assistive/communication devices  Outcome: Progressing     Problem: METABOLIC, FLUID AND ELECTROLYTES - ADULT  Goal: Electrolytes maintained within normal limits  Description: INTERVENTIONS:  - Monitor labs and assess patient for signs and symptoms of electrolyte imbalances  - Administer electrolyte replacement as ordered  - Monitor response to electrolyte replacements, including repeat lab results as appropriate  - Instruct patient on fluid and nutrition as appropriate  Outcome: Progressing  Goal: Fluid balance maintained  Description: INTERVENTIONS:  - Monitor labs   - Monitor I/O and WT  - Instruct patient on fluid and nutrition as appropriate  - Assess for signs & symptoms of volume excess or deficit  Outcome: Progressing     Problem: SKIN/TISSUE INTEGRITY - ADULT  Goal: Skin integrity remains intact  Description: INTERVENTIONS  - Identify patients at risk for skin breakdown  - Assess and monitor skin integrity  - Assess and monitor nutrition and hydration status  - Monitor labs (i e  albumin)  - Assess for incontinence   - Turn and reposition patient  - Assist with mobility/ambulation  - Relieve pressure over bony prominences  - Avoid friction and shearing  - Provide appropriate hygiene as needed including keeping skin clean and dry  - Evaluate need for skin moisturizer/barrier cream  - Collaborate with interdisciplinary team (i e  Nutrition, Rehabilitation, etc )   - Patient/family teaching  Outcome: Progressing     Problem: SAFETY ADULT  Goal: Maintain or return to baseline ADL function  Description: INTERVENTIONS:  -  Assess patient's ability to carry out ADLs; assess patient's baseline for ADL function and identify physical deficits which impact ability to perform ADLs (bathing, care of mouth/teeth, toileting, grooming, dressing, etc )  - Assess/evaluate cause of self-care deficits   - Assess range of motion  - Assess patient's mobility; develop plan if impaired  - Assess patient's need for assistive devices and provide as appropriate  - Encourage maximum independence but intervene and supervise when necessary  - Involve family in performance of ADLs  - Assess for home care needs following discharge   - Consider OT consult to assist with ADL evaluation and planning for discharge  - Provide patient education as appropriate  Outcome: Progressing  Goal: Maintain or return mobility status to optimal level  Description: INTERVENTIONS:  - Assess patient's baseline mobility status (ambulation, transfers, stairs, etc )    - Identify cognitive and physical deficits and behaviors that affect mobility  - Identify mobility aids required to assist with transfers and/or ambulation (gait belt, sit-to-stand, lift, walker, cane, etc )  - [place] fall precautions as indicated by assessment  - Record patient progress and toleration of activity level on Mobility SBAR; progress patient to next Phase/Stage  - Instruct patient to call for assistance with activity based on assessment  - Consider rehabilitation consult to assist with strengthening/weightbearing, etc   Outcome: Progressing     Problem: DISCHARGE PLANNING  Goal: Discharge to home or other facility with appropriate resources  Description: INTERVENTIONS:  - Identify barriers to discharge w/patient and caregiver  - Arrange for needed discharge resources and transportation as appropriate  - Identify discharge learning needs (meds, wound care, etc )  - Arrange for interpretive services to assist at discharge as needed  - Refer to Case Management Department for coordinating discharge planning if the patient needs post-hospital services based on physician/advanced practitioner order or complex needs related to functional status, cognitive ability, or social support system  Outcome: Progressing     Problem: Knowledge Deficit  Goal: Patient/family/caregiver demonstrates understanding of disease process, treatment plan, medications, and discharge instructions  Description: Complete learning assessment and assess knowledge base    Interventions:  - Provide teaching at level of understanding  - Provide teaching via preferred learning methods  Outcome: Progressing

## 2021-06-01 NOTE — PROGRESS NOTES
[facility]  Progress Note - MeredeFlitto Courts 6/26/1929, 80 y o  female MRN: [id number]  Unit/Bed#: E5 -01 Encounter: [id number]  Primary Care Provider: Kay Jonas MD   Date and time admitted to hospital: 5/31/2021  6:50 AM    * Witnessed seizure-like activity [facility])  Assessment & Plan  · Seizure-like activity at nursing facility  Likely secondary to hyponatremia  · Present on admission acute metabolic encephalopathy from hyponatremia vs postictal state  · Possible UTI:  Continue empiric ceftriaxone and follow urine culture  · Seizure precautions and appreciate neurology evaluation  For EEG  Hyponatremia  Assessment & Plan  · Acute on chronic hyponatremia  Was started on salt tablets as an outpatient  · Continue salt tablets for now  Was on NSS but will hold for now given CHF    Results from last 7 days   Lab Units 06/01/21  0711 05/31/21  2237 05/31/21  1424 05/31/21  0702   SODIUM mmol/L 127* 126* 122* 121*       Dementia (HCC)  Assessment & Plan  · Dementia without behavior disturbance  Mentation much improved given correcting hyponatremia and treating UTI    Diastolic CHF, chronic (HCC)  Assessment & Plan  Wt Readings from Last 3 Encounters:   05/31/21 70 8 kg (156 lb 1 4 oz)   11/14/19 67 6 kg (149 lb)   08/07/19 67 9 kg (149 lb 11 1 oz)     · Chronic diastolic CHF  Does have crackles bibasilar  Currently furosemide on hold due to hyponatremia  · DC further IV fluids and will discuss with Nephrology regarding further recommendations  UTI (urinary tract infection)  Assessment & Plan  · UTI likely contributing to acute metabolic encephalopathy  Continue ceftriaxone until cultures available    Hypertension  Assessment & Plan  · Continue amlodipine and metoprolol      VTE Pharmacologic Prophylaxis: VTE Score: 3 Moderate Risk (Score 3-4) - Pharmacological DVT Prophylaxis Ordered: Heparin      Patient Centered Rounds: I have performed bedside rounds with nursing staff today  Discussions with Specialists or Other Care Team Provider:  Nephrology    Education and Discussions with Family / Patient:  Left voicemail with nephew Rikki Pinto    Time Spent for Care: 25 mins  More than 50% of total time spent on counseling and coordination of care as described above  Current Length of Stay: 1 day(s)  Current Patient Status: Inpatient   Certification Statement: The patient will continue to require additional inpatient hospital stay due to hyponatremia  Discharge Plan / Estimated Discharge Date: Anticipate discharge in 48-72 hrs to rehab facility  Code Status: Level 3 - DNAR and DNI      Subjective:   Patient seen and examined  Much more awake today  No complaints    Objective:   Vitals: Blood pressure 150/66, pulse 80, temperature 98 8 °F (37 1 °C), resp  rate 17, weight 70 8 kg (156 lb 1 4 oz), SpO2 97 %, not currently breastfeeding  Physical Exam  Vitals signs reviewed  Constitutional:       General: She is not in acute distress  Appearance: Normal appearance  HENT:      Mouth/Throat:      Comments: Large protruding tongue  Eyes:      General: No scleral icterus  Cardiovascular:      Rate and Rhythm: Regular rhythm  Heart sounds: Normal heart sounds  Pulmonary:      Breath sounds: Decreased breath sounds and rales (Crackles bibasilar) present  No wheezing  Abdominal:      General: Bowel sounds are normal       Palpations: Abdomen is soft  Tenderness: There is no guarding or rebound  Musculoskeletal:         General: No swelling  Skin:     General: Skin is warm  Neurological:      Mental Status: She is alert  Mental status is at baseline  She is disoriented     Psychiatric:         Mood and Affect: Mood normal        Additional Data:   Labs:  Results from last 7 days   Lab Units 06/01/21  0711 05/31/21  0702   WBC Thousand/uL 8 53 9 21   HEMOGLOBIN g/dL 11 3* 11 9   HEMATOCRIT % 34 0* 34 4*   MCV fL 97 94   PLATELETS Thousands/uL 329 346     Results from last 7 days   Lab Units 06/01/21  0711 05/31/21  2237 05/31/21  1424 05/31/21  0702   SODIUM mmol/L 127* 126* 122* 121*   POTASSIUM mmol/L 3 6 3 8 5 0 4 7   CHLORIDE mmol/L 96* 92* 91* 87*   CO2 mmol/L 22 22 23 23   ANION GAP mmol/L 9 12 8 11   BUN mg/dL 9 11 11 14   CREATININE mg/dL 0 68 0 69 0 58* 0 89   CALCIUM mg/dL 8 3 8 5 8 2* 8 7   ALBUMIN g/dL 2 8*  --   --  3 2*   TOTAL BILIRUBIN mg/dL 0 55  --   --  0 47   ALK PHOS U/L 179*  --   --  209*   ALT U/L 27  --   --  32   AST U/L 36  --   --  30   EGFR ml/min/1 73sq m 77 76 81 57   GLUCOSE RANDOM mg/dL 92 111 107 127         Results from last 7 days   Lab Units 05/31/21  0702   TROPONIN I ng/mL <0 02     Results from last 7 days   Lab Units 05/31/21  0702   NT-PRO BNP pg/mL 578*                  Results from last 7 days   Lab Units 05/31/21  0702   TSH 3RD GENERATON uIU/mL 3 093     * I Have Reviewed All Lab Data Listed Above  Cultures:         Results from last 7 days   Lab Units 05/31/21  0800   SARS-COV-2  Negative           Lines/Drains:  Invasive Devices     Peripheral Intravenous Line            Peripheral IV 05/31/21 Right Wrist 1 day          Drain            Urethral Catheter Latex 18 Fr  507 days              Telemetry:      Imaging:  Imaging Reports Reviewed Today Include:   Xr Chest 2 Views    Result Date: 5/31/2021  Impression: Trace left pleural effusion  Workstation performed: [id number]     Ct Head Without Contrast    Result Date: 5/31/2021  Impression: No acute intracranial abnormality  Microangiopathic changes   Workstation performed: [id number]     Scheduled Meds:  Current Facility-Administered Medications   Medication Dose Route Frequency Provider Last Rate    acetaminophen  650 mg Oral Q6H PRN Yulia Mustafa DO      amLODIPine  5 mg Oral Daily LelandKADE Funes      cefTRIAXone  1,000 mg Intravenous Q24H Cliff Galloway DO 1,000 mg (06/01/21 0903)    heparin (porcine)  5,000 Units Subcutaneous [facility] Yulia Mustafa DO      metoprolol tartrate  12 5 mg Oral Q12H [address] Cliff Galloway DO      ondansetron  4 mg Intravenous Q4H PRN Samantha Cooper DO      sodium chloride  75 mL/hr Intravenous Continuous Cliff Galloway DO 75 mL/hr (05/31/21 1153)    sodium chloride  1 g Oral TID DO Samantha Bonds DO  [facility] Internal Medicine  Hospitalist    ** Please Note: This note has been constructed using a voice recognition system   **

## 2021-06-01 NOTE — ASSESSMENT & PLAN NOTE
· Acute on chronic hyponatremia  Was started on salt tablets as an outpatient  · Continue salt tablets for now    Was on NSS but will hold for now given CHF    Results from last 7 days   Lab Units 06/01/21  0711 05/31/21  2237 05/31/21  1424 05/31/21  0702   SODIUM mmol/L 127* 126* 122* 121*

## 2021-06-01 NOTE — ASSESSMENT & PLAN NOTE
· Dementia without behavior disturbance    Mentation much improved given correcting hyponatremia and treating UTI

## 2021-06-02 ENCOUNTER — APPOINTMENT (INPATIENT)
Dept: NEUROLOGY | Facility: HOSPITAL | Age: 86
DRG: 643 | End: 2021-06-02
Payer: MEDICARE

## 2021-06-02 ENCOUNTER — APPOINTMENT (INPATIENT)
Dept: CT IMAGING | Facility: HOSPITAL | Age: 86
DRG: 643 | End: 2021-06-02
Payer: MEDICARE

## 2021-06-02 PROBLEM — E87.6 HYPOKALEMIA: Status: ACTIVE | Noted: 2021-06-02

## 2021-06-02 PROBLEM — K14.8 LARGE TONGUE: Status: ACTIVE | Noted: 2021-06-02

## 2021-06-02 LAB
ALBUMIN SERPL BCP-MCNC: 2.8 G/DL (ref 3.5–5)
ALP SERPL-CCNC: 170 U/L (ref 46–116)
ALT SERPL W P-5'-P-CCNC: 29 U/L (ref 12–78)
ANION GAP SERPL CALCULATED.3IONS-SCNC: 17 MMOL/L (ref 4–13)
AST SERPL W P-5'-P-CCNC: 48 U/L (ref 5–45)
BACTERIA UR CULT: ABNORMAL
BILIRUB SERPL-MCNC: 0.6 MG/DL (ref 0.2–1)
BUN SERPL-MCNC: 14 MG/DL (ref 5–25)
CALCIUM ALBUM COR SERPL-MCNC: 10 MG/DL (ref 8.3–10.1)
CALCIUM SERPL-MCNC: 9 MG/DL (ref 8.3–10.1)
CHLORIDE SERPL-SCNC: 97 MMOL/L (ref 100–108)
CO2 SERPL-SCNC: 18 MMOL/L (ref 21–32)
CREAT SERPL-MCNC: 0.71 MG/DL (ref 0.6–1.3)
ERYTHROCYTE [DISTWIDTH] IN BLOOD BY AUTOMATED COUNT: 13 % (ref 11.6–15.1)
GFR SERPL CREATININE-BSD FRML MDRD: 75 ML/MIN/1.73SQ M
GLUCOSE SERPL-MCNC: 83 MG/DL (ref 65–140)
HCT VFR BLD AUTO: 34.8 % (ref 34.8–46.1)
HGB BLD-MCNC: 11.7 G/DL (ref 11.5–15.4)
LACTATE SERPL-SCNC: 1.3 MMOL/L (ref 0.5–2)
MAGNESIUM SERPL-MCNC: 2.3 MG/DL (ref 1.6–2.6)
MCH RBC QN AUTO: 32.4 PG (ref 26.8–34.3)
MCHC RBC AUTO-ENTMCNC: 33.6 G/DL (ref 31.4–37.4)
MCV RBC AUTO: 96 FL (ref 82–98)
PLATELET # BLD AUTO: 322 THOUSANDS/UL (ref 149–390)
PMV BLD AUTO: 8.5 FL (ref 8.9–12.7)
POTASSIUM SERPL-SCNC: 2.9 MMOL/L (ref 3.5–5.3)
PROT SERPL-MCNC: 6.6 G/DL (ref 6.4–8.2)
RBC # BLD AUTO: 3.61 MILLION/UL (ref 3.81–5.12)
SODIUM SERPL-SCNC: 132 MMOL/L (ref 136–145)
WBC # BLD AUTO: 11.04 THOUSAND/UL (ref 4.31–10.16)

## 2021-06-02 PROCEDURE — 97163 PT EVAL HIGH COMPLEX 45 MIN: CPT

## 2021-06-02 PROCEDURE — 95816 EEG AWAKE AND DROWSY: CPT

## 2021-06-02 PROCEDURE — 83605 ASSAY OF LACTIC ACID: CPT | Performed by: NURSE PRACTITIONER

## 2021-06-02 PROCEDURE — 92526 ORAL FUNCTION THERAPY: CPT

## 2021-06-02 PROCEDURE — 85027 COMPLETE CBC AUTOMATED: CPT | Performed by: INTERNAL MEDICINE

## 2021-06-02 PROCEDURE — 80053 COMPREHEN METABOLIC PANEL: CPT | Performed by: INTERNAL MEDICINE

## 2021-06-02 PROCEDURE — 70491 CT SOFT TISSUE NECK W/DYE: CPT

## 2021-06-02 PROCEDURE — 83735 ASSAY OF MAGNESIUM: CPT | Performed by: NURSE PRACTITIONER

## 2021-06-02 PROCEDURE — G1004 CDSM NDSC: HCPCS

## 2021-06-02 PROCEDURE — 99232 SBSQ HOSP IP/OBS MODERATE 35: CPT | Performed by: INTERNAL MEDICINE

## 2021-06-02 RX ORDER — POTASSIUM CHLORIDE 14.9 MG/ML
20 INJECTION INTRAVENOUS ONCE
Status: DISCONTINUED | OUTPATIENT
Start: 2021-06-02 | End: 2021-06-02

## 2021-06-02 RX ORDER — POTASSIUM CHLORIDE 14.9 MG/ML
20 INJECTION INTRAVENOUS ONCE
Status: COMPLETED | OUTPATIENT
Start: 2021-06-02 | End: 2021-06-02

## 2021-06-02 RX ORDER — POTASSIUM CHLORIDE 14.9 MG/ML
20 INJECTION INTRAVENOUS
Status: COMPLETED | OUTPATIENT
Start: 2021-06-02 | End: 2021-06-02

## 2021-06-02 RX ORDER — POTASSIUM CHLORIDE 20 MEQ/1
40 TABLET, EXTENDED RELEASE ORAL ONCE
Status: DISCONTINUED | OUTPATIENT
Start: 2021-06-02 | End: 2021-06-02

## 2021-06-02 RX ADMIN — POTASSIUM CHLORIDE 20 MEQ: 14.9 INJECTION, SOLUTION INTRAVENOUS at 13:23

## 2021-06-02 RX ADMIN — PIPERACILLIN AND TAZOBACTAM 3.38 G: 36; 4.5 INJECTION, POWDER, FOR SOLUTION INTRAVENOUS at 23:00

## 2021-06-02 RX ADMIN — CEFTRIAXONE SODIUM 1000 MG: 10 INJECTION, POWDER, FOR SOLUTION INTRAVENOUS at 08:52

## 2021-06-02 RX ADMIN — POTASSIUM CHLORIDE 20 MEQ: 14.9 INJECTION, SOLUTION INTRAVENOUS at 11:48

## 2021-06-02 RX ADMIN — HEPARIN SODIUM 5000 UNITS: 5000 INJECTION INTRAVENOUS; SUBCUTANEOUS at 13:01

## 2021-06-02 RX ADMIN — HEPARIN SODIUM 5000 UNITS: 5000 INJECTION INTRAVENOUS; SUBCUTANEOUS at 05:08

## 2021-06-02 RX ADMIN — PIPERACILLIN AND TAZOBACTAM 3.38 G: 36; 4.5 INJECTION, POWDER, FOR SOLUTION INTRAVENOUS at 11:48

## 2021-06-02 RX ADMIN — IOHEXOL 85 ML: 350 INJECTION, SOLUTION INTRAVENOUS at 11:38

## 2021-06-02 RX ADMIN — PIPERACILLIN AND TAZOBACTAM 3.38 G: 36; 4.5 INJECTION, POWDER, FOR SOLUTION INTRAVENOUS at 16:50

## 2021-06-02 RX ADMIN — AMLODIPINE BESYLATE 5 MG: 5 TABLET ORAL at 08:53

## 2021-06-02 RX ADMIN — HEPARIN SODIUM 5000 UNITS: 5000 INJECTION INTRAVENOUS; SUBCUTANEOUS at 22:53

## 2021-06-02 RX ADMIN — SODIUM CHLORIDE TAB 1 GM 1 G: 1 TAB at 08:52

## 2021-06-02 RX ADMIN — POTASSIUM CHLORIDE 20 MEQ: 14.9 INJECTION, SOLUTION INTRAVENOUS at 19:52

## 2021-06-02 RX ADMIN — Medication 12.5 MG: at 08:52

## 2021-06-02 RX ADMIN — POTASSIUM CHLORIDE 20 MEQ: 14.9 INJECTION, SOLUTION INTRAVENOUS at 17:12

## 2021-06-02 NOTE — PROGRESS NOTES
[address] [place] [address]  Progress Note - Génesis Thomson 6/26/1929, 80 y o  female MRN: [id number]  Unit/Bed#: E5 -01 Encounter: [id number]  Primary Care Provider: Carolyne Lezama MD   Date and time admitted to hospital: 5/31/2021  6:50 AM    * Witnessed seizure-like activity [facility])  Assessment & Plan  · Seizure-like activity at nursing facility  Secondary to hyponatremia and UTI  · Present on admission acute metabolic encephalopathy from hyponatremia vs postictal state  · UTI:  Urine culture with MDRO  Will change to zosyn based on culture sensitivities  · Seizure precautions and appreciate neurology evaluation  For EEG still pending  Hyponatremia  Assessment & Plan  · Acute on chronic hyponatremia  Was started on salt tablets as an outpatient  · 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  · Continue salt tablets for now  Was on NSS but will hold for now given CHF    Results from last 7 days   Lab Units 06/02/21  0627 06/01/21  1249 06/01/21  0711 05/31/21  2237 05/31/21  1424 05/31/21  0702   SODIUM mmol/L 132* 129* 127* 126* 122* 121*       Hypokalemia  Assessment & Plan  · Replete per nephrology    Results from last 7 days   Lab Units 06/02/21  0627 06/01/21  0711 05/31/21  2237 05/31/21  1424 05/31/21  0702   POTASSIUM mmol/L 2 9* 3 6 3 8 5 0 4 7       Large tongue  Assessment & Plan  · Large protruding tongue initially thought secondary to encephalopathy however now mentation has improved  · Will check soft tissue CT to rule out mass or obstruction but likely protruding secondary to ongoing encephalopathy  · Nephew reports this has happened every time the patient is hyponatremic    Dementia ([facility] [address] )  Assessment & Plan  · Dementia without behavior disturbance    Mentation much improved given correcting hyponatremia and treating UTI    Diastolic CHF, chronic (HCC)  Assessment & Plan  Wt Readings from Last 3 Encounters:   05/31/21 70 8 kg (156 lb 1 4 oz)   11/14/19 67 6 kg (149 lb)   08/07/19 67 9 kg (149 lb 11 1 oz)     · Chronic diastolic CHF  Does have crackles bibasilar  Currently furosemide on hold due to hyponatremia  · IV fluids discontinued and respiratory status appears stable    UTI (urinary tract infection)  Assessment & Plan  · UTI likely contributing to acute metabolic encephalopathy  MDRO  Change ceftriaxone to zosyn    Hypertension  Assessment & Plan  · Continue amlodipine and metoprolol      VTE Pharmacologic Prophylaxis: VTE Score: 3 Moderate Risk (Score 3-4) - Pharmacological DVT Prophylaxis Ordered: Heparin  Patient Centered Rounds: I have performed bedside rounds with nursing staff today  Discussions with Specialists or Other Care Team Provider:  Case management and speech pathologist    Education and Discussions with Family / Patient:  Sherry Feliciano on telephone    Time Spent for Care: 25 mins  More than 50% of total time spent on counseling and coordination of care as described above  Current Length of Stay: 2 day(s)  Current Patient Status: Inpatient   Certification Statement: The patient will continue to require additional inpatient hospital stay due to UTI encephalopathy  Discharge Plan / Estimated Discharge Date: Anticipate discharge in 48-72 hrs to discharge location to be determined pending rehab evaluations  Code Status: Level 3 - DNAR and DNI      Subjective:   Patient seen and examined  Mentation unchanged from yesterday  Awake but large protruding tongue    Objective:   Vitals: Blood pressure 166/66, pulse 80, temperature 98 6 °F (37 °C), temperature source Oral, resp  rate 20, weight 70 8 kg (156 lb 1 4 oz), SpO2 94 %, not currently breastfeeding  Physical Exam  Vitals signs reviewed  Constitutional:       General: She is not in acute distress  Appearance: Normal appearance  HENT:      Head:      Comments: Large protruding tongue  Eyes:      General: No scleral icterus  Cardiovascular:      Rate and Rhythm: Regular rhythm  Heart sounds: Normal heart sounds  Pulmonary:      Breath sounds: Decreased breath sounds present  No wheezing  Abdominal:      General: Bowel sounds are normal       Palpations: Abdomen is soft  Tenderness: There is no guarding or rebound  Musculoskeletal:         General: No swelling  Skin:     General: Skin is warm  Neurological:      Mental Status: She is alert  She is disoriented  Psychiatric:         Mood and Affect: Mood normal        Additional Data:   Labs:  Results from last 7 days   Lab Units 06/02/21  0627 06/01/21  0711 05/31/21  0702   WBC Thousand/uL 11 04* 8 53 9 21   HEMOGLOBIN g/dL 11 7 11 3* 11 9   HEMATOCRIT % 34 8 34 0* 34 4*   MCV fL 96 97 94   PLATELETS Thousands/uL 322 329 346     Results from last 7 days   Lab Units 06/02/21  0627 06/01/21  1249 06/01/21  0711 05/31/21  2237 05/31/21  1424 05/31/21  0702   SODIUM mmol/L 132* 129* 127* 126* 122* 121*   POTASSIUM mmol/L 2 9*  --  3 6 3 8 5 0 4 7   CHLORIDE mmol/L 97*  --  96* 92* 91* 87*   CO2 mmol/L 18*  --  22 22 23 23   ANION GAP mmol/L 17*  --  9 12 8 11   BUN mg/dL 14  --  9 11 11 14   CREATININE mg/dL 0 71  --  0 68 0 69 0 58* 0 89   CALCIUM mg/dL 9 0  --  8 3 8 5 8 2* 8 7   ALBUMIN g/dL 2 8*  --  2 8*  --   --  3 2*   TOTAL BILIRUBIN mg/dL 0 60  --  0 55  --   --  0 47   ALK PHOS U/L 170*  --  179*  --   --  209*   ALT U/L 29  --  27  --   --  32   AST U/L 48*  --  36  --   --  30   EGFR ml/min/1 73sq m 75  --  77 76 81 57   GLUCOSE RANDOM mg/dL 83  --  92 111 107 127         Results from last 7 days   Lab Units 05/31/21  0702   TROPONIN I ng/mL <0 02     Results from last 7 days   Lab Units 05/31/21  0702   NT-PRO BNP pg/mL 578*                  Results from last 7 days   Lab Units 05/31/21  0702   TSH 3RD GENERATON uIU/mL 3 093     * I Have Reviewed All Lab Data Listed Above      Cultures:   Results from last 7 days   Lab Units 05/31/21  0825   URINE CULTURE  >100,000 cfu/ml Providencia stuartii*  >100,000 cfu/ml Morganella morganii*  80,000-89,000 cfu/ml        Results from last 7 days   Lab Units 05/31/21  0800   SARS-COV-2  Negative           Lines/Drains:  Invasive Devices     Peripheral Intravenous Line            Peripheral IV 06/01/21 Right Forearm less than 1 day          Drain            Urethral Catheter Latex 18 Fr  508 days              Telemetry:      Imaging:  Imaging Reports Reviewed Today Include:   Xr Chest 2 Views    Result Date: 5/31/2021  Impression: Trace left pleural effusion  Workstation performed: [id number]     Ct Head Without Contrast    Result Date: 5/31/2021  Impression: No acute intracranial abnormality  Microangiopathic changes  Workstation performed: [id number]     Scheduled Meds:  Current Facility-Administered Medications   Medication Dose Route Frequency Provider Last Rate    acetaminophen  650 mg Oral Q6H PRN Veronica alvarez DO      amLODIPine  5 mg Oral Daily KADE Sanders      cefTRIAXone  1,000 mg Intravenous Q24H Cliff Galloway DO 1,000 mg (06/02/21 7442)    heparin (porcine)  5,000 Units Subcutaneous [facility] Cliff Galloway DO      metoprolol tartrate  12 5 mg Oral Q12H [address] Cliff Galloway DO      ondansetron  4 mg Intravenous Q4H PRN Veronica alvarez DO      sodium chloride  1 g Oral TID With Meals MD Veronica Medrano DO  [facility] Internal Medicine  Hospitalist    ** Please Note: This note has been constructed using a voice recognition system   **

## 2021-06-02 NOTE — CASE MANAGEMENT
GMLOS: 1             LOS: 2  BUNDLED? No  UNPLANNED READMISSION LEVEL: 10 low  30 DAY READMISSION? No    Patient was admitted on 5/31/21 with seizure-like activity  PRESTON made a PC to patient's nephew Asad to conduct a CM assessment and discuss discharge planning  Per Asad, patient lives at [facility] and has been there for 3 and a half years  She is mostly wheelchair bound and needs assistance with her ADL's  She has wheelchair and walker as DME  She receives therapy as needed per Asad  She uses the facilities pharmacy RX and her is seen by MD at the facility  MD on file is Brodie Dandy  He denies any hx of MH or D&A  He reports that she gets confused when she in hospital settings  EHR notes hx of depression  A referral was made to to [facility]  SW/CM dept will continue to follow

## 2021-06-02 NOTE — PHYSICAL THERAPY NOTE
PT EVALUATION    Pt  Name: Julia Petty  Pt  Age: 80 y o  MRN: [id number]  LENGTH OF STAY: 2    Patient Active Problem List   Diagnosis    Altered mental status    Elevated troponin    Status post placement of cardiac pacemaker    Urinary retention    Pacemaker    Hypertension    Depression    Late onset Alzheimer's disease without behavioral disturbance (HCC)    UTI (urinary tract infection)    Elevated TSH    Hyponatremia    Other constipation    Chronic diastolic congestive heart failure (HCC)    Chronic left shoulder pain    Impacted cerumen of right ear    Localized edema    Sebaceous cyst    Hordeolum externum of left upper eyelid    Essential hypertension    Diastolic CHF, chronic (HCC)    Witnessed seizure-like activity (HCC)    Dementia (HCC)    Large tongue    Hypokalemia       Admitting Diagnoses:   Hyponatremia [E87 1]  Seizure ([address] ) [R56 9]  UTI (urinary tract infection) [N39 0]  Pleural effusion on left [J90]    Past Medical History:   Diagnosis Date    Acute bronchopneumonia 8/3/2019    Acute encephalopathy 8/18/2017    Acute respiratory failure ([address] ) 8/3/2019    Altered mental status     Depression     Diastolic CHF, chronic (HCC)     Dysphagia, oropharyngeal phase     Essential hypertension 01/17/2017    Hypertension     Impacted cerumen of right ear 9/16/2019    Malaise 5/27/2020    Multiple thyroid nodules     Pacemaker     Rhabdomyolysis 1/17/2017    Sepsis ([address] ) 8/3/2019    Urinary retention     Urinary tract infection 8/18/2017       Past Surgical History:   Procedure Laterality Date    CARDIAC PACEMAKER PLACEMENT         Imaging Studies:  CT soft tissue neck w contrast   Final Result by Pau Moreno DO (06/02 1311)      No soft tissue mass or adenopathy identified  Patient's mouth is opened and the condyles are hyperintense related anterior to the condylar eminence bilaterally  Correlate for TMJ pathology              Workstation performed: [id number]         CT head without contrast   Final Result by [facility], DO (05/31 7149)      No acute intracranial abnormality  Microangiopathic changes  Workstation performed: [id number]         XR chest 2 views   ED Interpretation by Ade Sidhu PA-C (05/31 1830)   No acute cardiopulmonary findings seen by me at this time  Final Result by [facility], DO (05/31 5334)      Trace left pleural effusion  Workstation performed: [id number]              06/02/21 1508   PT Last Visit   PT Visit Date 06/02/21   Note Type   Note type Evaluation   Pain Assessment   Pain Assessment Tool FLACC   Pain Rating: FLACC (Rest) - Face 0   Pain Rating: FLACC (Rest) - Legs 0   Pain Rating: FLACC (Rest) - Activity 0   Pain Rating: FLACC (Rest) - Cry 0   Pain Rating: FLACC (Rest) - Consolability 0   Score: FLACC (Rest) 0   Pain Rating: FLACC (Activity) - Face 0   Pain Rating: FLACC (Activity) - Legs 1   Pain Rating: FLACC (Activity) - Activity 1   Pain Rating: FLACC (Activity) - Cry 0   Pain Rating: FLACC (Activity) - Consolability 0   Score: FLACC (Activity) 2   Home Living   Type of Home SNF  ([facility])   Home Equipment Wheelchair-manual   Prior Function   Level of [place] Needs assistance with ADLs and functional mobility   Lives With Facility staff   Receives Help From Personal care attendant   ADL Assistance Needs assistance   Comments Per CM notes, pt resident of [address] [place] [address] & is w/c bound & requires assistance w/ overall ADL's & mobility at baseline   Restrictions/Precautions   Other Precautions Contact/isolation;Cognitive; Chair Alarm; Bed Alarm;Multiple lines; Fall Risk   General   Additional Pertinent History dementia   Family/Caregiver Present No   Cognition   Overall Cognitive Status Unable to assess   Arousal/Participation Lethargic   Orientation Level Oriented to person   Following Commands Unable to follow one step commands   Comments Pt lethargic t/o session; briefly arousable to name; unable to follow any commands during session   RUE Assessment   RUE Assessment WFL  (ROM WFL passively)   LUE Assessment   LUE Assessment WFL  (ROM WFL passively)   RLE Assessment   RLE Assessment WFL  (PROM WFL w/ noted pt resisting during ROM; tight adductors)   LLE Assessment   LLE Assessment WFL  (PROM WFL w/ noted pt resisting during ROM; tight adductors)   Coordination   Movements are Fluid and Coordinated   (unable to test)   Sensation   (unable to test)   Bed Mobility   Rolling R 1  Dependent   Additional items Increased time required;Verbal cues;LE management   Rolling L 1  Dependent   Additional items Increased time required;Verbal cues;LE management   Supine to Sit   (not appropriate)   Sit to Supine   (not appropriate)   Additional Comments pt unable to follow commands & noted no participation from pt; ?baseline function for bed mobility-> ?adrienne lift vs Ax2   Transfers   Sit to Stand   (not appropriate)   Stand to Sit   (not appropriate)   Additional Comments ?baseline function for transfers-> ?adrienne lift vs Ax2   Ambulation/Elevation   Gait pattern Not appropriate  (pt nonambulatory at baseline)   Balance   Static Sitting Zero   Activity Tolerance   Activity Tolerance Treatment limited secondary to medical complications (Comment); Patient limited by fatigue   Nurse Made Aware RN Alyssa   Assessment   Prognosis Poor   Problem List Decreased strength;Decreased endurance; Impaired balance;Decreased mobility; Decreased cognition; Impaired judgement;Decreased safety awareness   Assessment Pt 80 y o  female admitted for Witnessed seizure-like activity ([facility] [address] ) w/ Hyponatremia E87 1; UTI (urinary tract infection) N39 0 & Pleural effusion on left J90  PTA, pt resident of [facility]  Per CM notes, pt w/c bound & requires full assistance for ADL's & functional mobility at baseline  Pt referred to PT for mobility assessment & D/C planning   On eval, pt appears to be close to her normal baseline function  Pt dependent w/ rolling R<>L in bed  Pt lethargic t/o session & only responded to her name briefly after frequent stimulation  Eyes closed t/o session  BUE & BLE PROM WFL except (+) tight hip adductors bilaterally  Pt's tongue hanging out w/ drooling t/o session  RN unsure if this is chronic  The patient's AM-PAC Basic Mobility Inpatient Short Form Low Function Raw Score 8 , Standardized Score is 10 37  A standardized score less 42 9 suggests the patient may benefit from discharge to post-acute rehab services  From PT standpoint, pt to return to facility when medically cleared  PT at SNF to re-evaluate pt as appropriate  Will D/C IPPT  Will recommend Nsg staff to utilized adrienne lift for safe transfers OOB  Please advise PT when needs change  Nsg notified      Barriers to Discharge None   Goals   Patient Goals none stated   PT Treatment Day 0   Plan   Treatment/Interventions Spoke to nursing;Spoke to case management  (PT eval only)   PT Frequency Other (Comment)  (D/C PT)   Recommendation   PT Discharge Recommendation   (return to facility w/ PT at SNF to re-eval as appropriate)   Equipment Recommended Other (Comment)  (adrienne lift for OOB transfers)   PT - OK to Discharge Yes  (to SNF when medically cleared)   AM-PAC Basic Mobility Inpatient   Turning in Bed Without Bedrails 1   Lying on Back to Sitting on Edge of Flat Bed 1   Moving Bed to Chair 1   Standing Up From Chair 1   Walk in Room 1   Climb 3-5 Stairs 1   Basic Mobility Inpatient Raw Score 6   Turning Head Towards Sound 1   Follow Simple Instructions 1   Low Function Basic Mobility Raw Score 8   Low Function Basic Mobility Standardized Score 10 37   Hx/personal factors: co-morbidities, advanced age, mutliple lines, telemetry, use of AD, dec cognition, fall risk and assist w/ ADL's  Examination: baseline dec mobility, dec balance, dec endurance, dec amb, risk for falls, dec cognition  Clinical: unpredictable (ongoing medical status, abnormal lab values and risk for falls)  Complexity: high     Sonia Simpson, PT

## 2021-06-02 NOTE — PLAN OF CARE
Problem: Potential for Falls  Goal: Patient will remain free of falls  Description: INTERVENTIONS:  - Assess patient frequently for physical needs  -  Identify cognitive and physical deficits and behaviors that affect risk of falls    -  [place] fall precautions as indicated by assessment   - Educate patient/family on patient safety including physical limitations  - Instruct patient to call for assistance with activity based on assessment  - Modify environment to reduce risk of injury  - Consider OT/PT consult to assist with strengthening/mobility  Outcome: Progressing     Problem: Prexisting or High Potential for Compromised Skin Integrity  Goal: Skin integrity is maintained or improved  Description: INTERVENTIONS:  - Identify patients at risk for skin breakdown  - Assess and monitor skin integrity  - Assess and monitor nutrition and hydration status  - Monitor labs   - Assess for incontinence   - Turn and reposition patient  - Assist with mobility/ambulation  - Relieve pressure over bony prominences  - Avoid friction and shearing  - Provide appropriate hygiene as needed including keeping skin clean and dry  - Evaluate need for skin moisturizer/barrier cream  - Collaborate with interdisciplinary team   - Patient/family teaching  - Consider wound care consult   Outcome: Progressing     Problem: NEUROSENSORY - ADULT  Goal: Achieves stable or improved neurological status  Description: INTERVENTIONS  - Monitor and report changes in neurological status  - Monitor vital signs such as temperature, blood pressure, glucose, and any other labs ordered   - Initiate measures to prevent increased intracranial pressure  - Monitor for seizure activity and implement precautions if appropriate      Outcome: Progressing  Goal: Remains free of injury related to seizures activity  Description: INTERVENTIONS  - Maintain airway, patient safety  and administer oxygen as ordered  - Monitor patient for seizure activity, document and report duration and description of seizure to physician/advanced practitioner  - If seizure occurs,  ensure patient safety during seizure  - Reorient patient post seizure  - Seizure pads on all 4 side rails  - Instruct patient/family to notify RN of any seizure activity including if an aura is experienced  - Instruct patient/family to call for assistance with activity based on nursing assessment  - Administer anti-seizure medications if ordered    Outcome: Progressing  Goal: Achieves maximal functionality and self care  Description: INTERVENTIONS  - Monitor swallowing and airway patency with patient fatigue and changes in neurological status  - Encourage and assist patient to increase activity and self care     - Encourage visually impaired, hearing impaired and aphasic patients to use assistive/communication devices  Outcome: Progressing     Problem: METABOLIC, FLUID AND ELECTROLYTES - ADULT  Goal: Electrolytes maintained within normal limits  Description: INTERVENTIONS:  - Monitor labs and assess patient for signs and symptoms of electrolyte imbalances  - Administer electrolyte replacement as ordered  - Monitor response to electrolyte replacements, including repeat lab results as appropriate  - Instruct patient on fluid and nutrition as appropriate  Outcome: Progressing  Goal: Fluid balance maintained  Description: INTERVENTIONS:  - Monitor labs   - Monitor I/O and WT  - Instruct patient on fluid and nutrition as appropriate  - Assess for signs & symptoms of volume excess or deficit  Outcome: Progressing     Problem: SKIN/TISSUE INTEGRITY - ADULT  Goal: Skin integrity remains intact  Description: INTERVENTIONS  - Identify patients at risk for skin breakdown  - Assess and monitor skin integrity  - Assess and monitor nutrition and hydration status  - Monitor labs (i e  albumin)  - Assess for incontinence   - Turn and reposition patient  - Assist with mobility/ambulation  - Relieve pressure over bony prominences  - Avoid friction and shearing  - Provide appropriate hygiene as needed including keeping skin clean and dry  - Evaluate need for skin moisturizer/barrier cream  - Collaborate with interdisciplinary team (i e  Nutrition, Rehabilitation, etc )   - Patient/family teaching  Outcome: Progressing     Problem: DISCHARGE PLANNING  Goal: Discharge to home or other facility with appropriate resources  Description: INTERVENTIONS:  - Identify barriers to discharge w/patient and caregiver  - Arrange for needed discharge resources and transportation as appropriate  - Identify discharge learning needs (meds, wound care, etc )  - Arrange for interpretive services to assist at discharge as needed  - Refer to Case Management Department for coordinating discharge planning if the patient needs post-hospital services based on physician/advanced practitioner order or complex needs related to functional status, cognitive ability, or social support system  Outcome: Progressing     Problem: SAFETY ADULT  Goal: Maintain or return to baseline ADL function  Description: INTERVENTIONS:  -  Assess patient's ability to carry out ADLs; assess patient's baseline for ADL function and identify physical deficits which impact ability to perform ADLs (bathing, care of mouth/teeth, toileting, grooming, dressing, etc )  - Assess/evaluate cause of self-care deficits   - Assess range of motion  - Assess patient's mobility; develop plan if impaired  - Assess patient's need for assistive devices and provide as appropriate  - Encourage maximum independence but intervene and supervise when necessary  - Involve family in performance of ADLs  - Assess for home care needs following discharge   - Consider OT consult to assist with ADL evaluation and planning for discharge  - Provide patient education as appropriate  Outcome: Progressing  Goal: Maintain or return mobility status to optimal level  Description: INTERVENTIONS:  - Assess patient's baseline mobility status (ambulation, transfers, stairs, etc )    - Identify cognitive and physical deficits and behaviors that affect mobility  - Identify mobility aids required to assist with transfers and/or ambulation (gait belt, sit-to-stand, lift, walker, cane, etc )  - [place] fall precautions as indicated by assessment  - Record patient progress and toleration of activity level on Mobility SBAR; progress patient to next Phase/Stage  - Instruct patient to call for assistance with activity based on assessment  - Consider rehabilitation consult to assist with strengthening/weightbearing, etc   Outcome: Progressing     Problem: Knowledge Deficit  Goal: Patient/family/caregiver demonstrates understanding of disease process, treatment plan, medications, and discharge instructions  Description: Complete learning assessment and assess knowledge base  Interventions:  - Provide teaching at level of understanding  - Provide teaching via preferred learning methods  Outcome: Progressing     Problem: Nutrition/Hydration-ADULT  Goal: Nutrient/Hydration intake appropriate for improving, restoring or maintaining nutritional needs  Description: Monitor and assess patient's nutrition/hydration status for malnutrition  Collaborate with interdisciplinary team and initiate plan and interventions as ordered  Monitor patient's weight and dietary intake as ordered or per policy  Utilize nutrition screening tool and intervene as necessary  Determine patient's food preferences and provide high-protein, high-caloric foods as appropriate       INTERVENTIONS:  - Monitor oral intake, urinary output, labs, and treatment plans  - Assess nutrition and hydration status and recommend course of action  - Evaluate amount of meals eaten  - Assist patient with eating if necessary   - Allow adequate time for meals  - Recommend/ encourage appropriate diets, oral nutritional supplements, and vitamin/mineral supplements  - Order, calculate, and assess calorie counts as needed  - Recommend, monitor, and adjust tube feedings and TPN/PPN based on assessed needs  - Assess need for intravenous fluids  - Provide specific nutrition/hydration education as appropriate  - Include patient/family/caregiver in decisions related to nutrition  Outcome: Progressing

## 2021-06-02 NOTE — SPEECH THERAPY NOTE
Speech Language/Pathology    Speech/Language Pathology Progress Note    Patient Name: Bhavani Stone  [id number]'[id number] Date: 6/2/2021     Problem List  Principal Problem: Witnessed seizure-like activity (HCC)  Active Problems:    Hypertension    Depression    UTI (urinary tract infection)    Hyponatremia    Chronic diastolic congestive heart failure (HCC)    Diastolic CHF, chronic (HCC)    Dementia (HCC)       Past Medical History  Past Medical History:   Diagnosis Date    Acute bronchopneumonia 8/3/2019    Acute encephalopathy 8/18/2017    Acute respiratory failure ([address] ) 8/3/2019    Altered mental status     Depression     Diastolic CHF, chronic (HCC)     Dysphagia, oropharyngeal phase     Essential hypertension 01/17/2017    Hypertension     Impacted cerumen of right ear 9/16/2019    Malaise 5/27/2020    Multiple thyroid nodules     Pacemaker     Rhabdomyolysis 1/17/2017    Sepsis ([facility] [address] ) 8/3/2019    Urinary retention     Urinary tract infection 8/18/2017        Past Surgical History  Past Surgical History:   Procedure Laterality Date    CARDIAC PACEMAKER PLACEMENT           Subjective:  Pt responding to questions appropriately however she is very difficult to understand  tongue is hanging out and I can not get her to close her mouth or bring her tongue in  Reportedly this happens when sodium is off but it is normal now  Objective:  Seen for po potential and further recommendations  Large amount of thick mucous hanging out of mouth when I arrived  Suctioned  White coating on tongue  Able to be removed w/ brush on suction  + gagging w/ deep suction but she initially was not swallowing after gagging (pt's generally do) Suctioned secretions from pharynx as well  Pt was cued to cough  Weak to begin  Improved to fair  She eventually swallowed x 3  Weak  Able to feel secretions when swallow was palpated  trialed min amt honey thick  Not transferring despite me attempting to close her lips   Suctioned honey thick and additional saliva back out  Assessment:  Not managing secretions  , needs suction  Plan/Recommendations:  ? Etiology  Unable to go for MRI  continue npo w/ suction  ? Need for alternate nutrition

## 2021-06-02 NOTE — ASSESSMENT & PLAN NOTE
· Replete per nephrology    Results from last 7 days   Lab Units 06/02/21  0627 06/01/21  0711 05/31/21  2237 05/31/21  1424 05/31/21  0702   POTASSIUM mmol/L 2 9* 3 6 3 8 5 0 4 7

## 2021-06-02 NOTE — ASSESSMENT & PLAN NOTE
· Seizure-like activity at nursing facility  Secondary to hyponatremia and UTI  · Present on admission acute metabolic encephalopathy from hyponatremia vs postictal state  · UTI:  Urine culture with MDRO  Will change to zosyn based on culture sensitivities  · Seizure precautions and appreciate neurology evaluation  For EEG still pending

## 2021-06-02 NOTE — OCCUPATIONAL THERAPY NOTE
Occupational Therapy Screen:    Patient Name: Tavon Estrada  NNLST'S Date: 6/2/2021    OT consult received  Chart reviewed  Spoke to PT  Pt is a resident of [facility] for 3 5 years  Pt is W/C bound at baseline and requires assist w/ all ADLs at baseline  No acute skilled OT needs identified at this time  Will D/C OT  Please re-consult as needed      Kaylie Barreto OTR/L

## 2021-06-02 NOTE — ASSESSMENT & PLAN NOTE
Wt Readings from Last 3 Encounters:   05/31/21 70 8 kg (156 lb 1 4 oz)   11/14/19 67 6 kg (149 lb)   08/07/19 67 9 kg (149 lb 11 1 oz)     · Chronic diastolic CHF  Does have crackles bibasilar  Currently furosemide on hold due to hyponatremia      · IV fluids discontinued and respiratory status appears stable

## 2021-06-02 NOTE — ASSESSMENT & PLAN NOTE
· Large protruding tongue initially thought secondary to encephalopathy however now mentation has improved  · Will check soft tissue CT to rule out mass or obstruction but likely protruding secondary to ongoing encephalopathy  · Nephew reports this has happened every time the patient is hyponatremic

## 2021-06-02 NOTE — PROGRESS NOTES
NEPHROLOGY PROGRESS NOTE   Nader Winkler 80 y o  female MRN: [id number]  Unit/Bed#: E5 -01 Encounter: [id number]  Reason for Consult: Hyponatremia    PLAN:   - Hyponatremia, Na level correcting appropriately  Currently off of IVF  Continues on salt tabs  Received Lasix x 1 06/01  Currently NPO and awaiting speech therapy to clear for diet, although she is receiving her oral medications  -UTI, continues on abx    -dysphagia, speech evaluating  Difficulty managing secretions and keeping tongue in her mouth  Currently NPO  -Chronic heart failure, may give diuretics as needed  -hypokalemia, Mg level pending  Suspect secondary to diuretic administration  Will replace as needed    -anion gap acidosis, checking lactic acid  Will discuss with Dr Deyanira Diaz starting on oral bicarb vs bicarb drip  ASSESSMENT/PLAN:  Hyponatremia:  suspected component of hypovolemia as well as SIADH  -  Presents with sodium level of 121   -  Na level correcting to 132 this morning, correcting appropriately  -  previously on NSS, now off of IVF  -  Continue on sodium chloride tablets 1 g 3 times per day  -  Received Lasix 10 mg po x 1 06/02  -  Currently NPO  Awaiting speech to clear for diet  -  Lasix on hold  May give as needed  -  Serum osmolality 262, urine osmolality 490, urine sodium 71, TSH 3 09, a m  cortisol pending, and uric acid level 3 5   -  Baseline creatinine 0 6-0 8, creatinine currently at baseline        Seizure-like activity:  Suspected secondary to hyponatremia   - Neurology consulted, continues on seizure precautions  - CT head negative for acute intracranial abnormality      Gram-negative joyce UTI:  -  UA showed large blood, positive nitrites, innumerable WBCs , RBCs, and bacteria  -  Continues on antibiotics       Chronic diastolic CHF:   Most recent echocardiogram shows EF of 70% and grade 1 diastolic dysfunction    -  Chest x-ray shows trace left pleural effusion   -  Continue daily weights, fluid restriction, I/O   -  OP diuretic: Lasix 20 mg every other day  -  May give Lasix as needed         Hypertension:  Overall acceptable for age  -  Continue on amlodipine 5 mg po daily and metoprolol 12 5 mg every 12 hours  -  If BP remains above goal  Increase amlodipine to 10 mg po daily     -  Holding parameters adjusted for SBP <130 mmHg  Hypokalemia: likely due to diuretic administration    -  Checking Mg level  -  Will provide ongoing replacement as needed  Anion gap acidosis:   -  Check lactic acid level  -  Will discuss with Dr Dorcas Ray placing on bicarb drip vs oral bicarbonate         Other: Dementia    Disposition:  requiring additional stay due to medical needs  SUBJECTIVE:  The patient is resting in her bed  Speech is at the bedside  She is alert and pleasant this morning  Her tongue is sticking out of her mouth  She is following commands  She is having difficulty managing her secretions  She is currently NPO until cleared by speech  She is able to take her pills  OBJECTIVE:  Current Weight: Weight - Scale: 70 8 kg (156 lb 1 4 oz)  Vitals:    06/02/21 0500 06/02/21 0619 06/02/21 0700 06/02/21 0743   BP:  150/56  166/66   BP Location:  Right arm  Right arm   Pulse: 71 83 79 80   Resp:  16  20   Temp:    98 6 °F (37 °C)   TempSrc:    Oral   SpO2: 96% 92% 93% 94%   Weight:           Intake/Output Summary (Last 24 hours) at 6/2/2021 0954  Last data filed at 6/2/2021 4877  Gross per 24 hour   Intake 120 ml   Output 1800 ml   Net -1680 ml     General: NAD  Skin: warm, dry, intact, no rash  HEENT: Moist mucous membranes, tongue hanging out of mouth     Neck: No apparent JVD appreciated  Chest: lung sounds clear B/L, on RA   CVS:Regular rate and rhythm, no murmer   Abdomen: Soft, round, non-tender, +BS  Extremities: No B/L LE edema present  Neuro: alert, following commands  Psych: appropriate mood and affect     Medications:    Current Facility-Administered Medications:   acetaminophen (TYLENOL) tablet 650 mg, 650 mg, Oral, Q6H PRN, Cliff Galloway DO    amLODIPine (NORVASC) tablet 5 mg, 5 mg, Oral, Daily, KADE Echevarria, 5 mg at 06/02/21 0853    cefTRIAXone (ROCEPHIN) 1,000 mg in dextrose 5 % 50 mL IVPB, 1,000 mg, Intravenous, Q24H, Cliff Galloway DO, Last Rate: 100 mL/hr at 06/02/21 0852, 1,000 mg at 06/02/21 0852    heparin (porcine) subcutaneous injection 5,000 Units, 5,000 Units, Subcutaneous, Q8H [address], Cliff Galloway DO, 5,000 Units at 06/02/21 0508    metoprolol tartrate (LOPRESSOR) partial tablet 12 5 mg, 12 5 mg, Oral, Q12H [address], Cliff Galloway DO, 12 5 mg at 06/02/21 0852    ondansetron (ZOFRAN) injection 4 mg, 4 mg, Intravenous, Q4H PRN, [facility]    sodium chloride tablet 1 g, 1 g, Oral, TID With Meals, Kwame Bejarano MD, 1 g at 06/02/21 3563    Laboratory Results:  Results from last 7 days   Lab Units 06/02/21  0627 06/01/21  1249 06/01/21  0711 05/31/21  2237  05/31/21  0702   WBC Thousand/uL 11 04*  --  8 53  --   --  9 21   HEMOGLOBIN g/dL 11 7  --  11 3*  --   --  11 9   HEMATOCRIT % 34 8  --  34 0*  --   --  34 4*   PLATELETS Thousands/uL 322  --  329  --   --  346   SODIUM mmol/L 132* 129* 127* 126*   < > 121*   POTASSIUM mmol/L 2 9*  --  3 6 3 8   < > 4 7   CHLORIDE mmol/L 97*  --  96* 92*   < > 87*   CO2 mmol/L 18*  --  22 22   < > 23   BUN mg/dL 14  --  9 11   < > 14   CREATININE mg/dL 0 71  --  0 68 0 69   < > 0 89   CALCIUM mg/dL 9 0  --  8 3 8 5   < > 8 7   ALK PHOS U/L 170*  --  179*  --   --  209*   ALT U/L 29  --  27  --   --  32   AST U/L 48*  --  36  --   --  30    < > = values in this interval not displayed

## 2021-06-02 NOTE — ASSESSMENT & PLAN NOTE
· Acute on chronic hyponatremia  Was started on salt tablets as an outpatient  · 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  · Continue salt tablets for now    Was on NSS but will hold for now given CHF    Results from last 7 days   Lab Units 06/02/21  0627 06/01/21  1249 06/01/21  0711 05/31/21  2237 05/31/21  1424 05/31/21  0702   SODIUM mmol/L 132* 129* 127* 126* 122* 121*

## 2021-06-03 ENCOUNTER — APPOINTMENT (INPATIENT)
Dept: CT IMAGING | Facility: HOSPITAL | Age: 86
DRG: 643 | End: 2021-06-03
Payer: MEDICARE

## 2021-06-03 PROBLEM — M27.8: Status: ACTIVE | Noted: 2021-06-03

## 2021-06-03 LAB
ALBUMIN SERPL BCP-MCNC: 2.8 G/DL (ref 3.5–5)
ALP SERPL-CCNC: 159 U/L (ref 46–116)
ALT SERPL W P-5'-P-CCNC: 30 U/L (ref 12–78)
ANION GAP SERPL CALCULATED.3IONS-SCNC: 20 MMOL/L (ref 4–13)
AST SERPL W P-5'-P-CCNC: 50 U/L (ref 5–45)
BASE EX.OXY STD BLDV CALC-SCNC: 80.5 % (ref 60–80)
BASE EXCESS BLDV CALC-SCNC: -8.5 MMOL/L
BETA-HYDROXYBUTYRATE: 5.1 MMOL/L
BILIRUB SERPL-MCNC: 0.68 MG/DL (ref 0.2–1)
BUN SERPL-MCNC: 14 MG/DL (ref 5–25)
CALCIUM ALBUM COR SERPL-MCNC: 9.9 MG/DL (ref 8.3–10.1)
CALCIUM SERPL-MCNC: 8.9 MG/DL (ref 8.3–10.1)
CHLORIDE SERPL-SCNC: 102 MMOL/L (ref 100–108)
CO2 SERPL-SCNC: 15 MMOL/L (ref 21–32)
CREAT SERPL-MCNC: 0.9 MG/DL (ref 0.6–1.3)
ERYTHROCYTE [DISTWIDTH] IN BLOOD BY AUTOMATED COUNT: 13.3 % (ref 11.6–15.1)
GFR SERPL CREATININE-BSD FRML MDRD: 56 ML/MIN/1.73SQ M
GLUCOSE SERPL-MCNC: 100 MG/DL (ref 65–140)
HCO3 BLDV-SCNC: 16.8 MMOL/L (ref 24–30)
HCT VFR BLD AUTO: 35.6 % (ref 34.8–46.1)
HGB BLD-MCNC: 11.2 G/DL (ref 11.5–15.4)
MAGNESIUM SERPL-MCNC: 2.4 MG/DL (ref 1.6–2.6)
MCH RBC QN AUTO: 32.3 PG (ref 26.8–34.3)
MCHC RBC AUTO-ENTMCNC: 31.5 G/DL (ref 31.4–37.4)
MCV RBC AUTO: 103 FL (ref 82–98)
O2 CT BLDV-SCNC: 13.6 ML/DL
PCO2 BLDV: 33.9 MM HG (ref 42–50)
PH BLDV: 7.31 [PH] (ref 7.3–7.4)
PHOSPHATE SERPL-MCNC: 2.9 MG/DL (ref 2.3–4.1)
PLATELET # BLD AUTO: 368 THOUSANDS/UL (ref 149–390)
PMV BLD AUTO: 8.8 FL (ref 8.9–12.7)
PO2 BLDV: 46.1 MM HG (ref 35–45)
POTASSIUM SERPL-SCNC: 3.8 MMOL/L (ref 3.5–5.3)
PROT SERPL-MCNC: 6.6 G/DL (ref 6.4–8.2)
RBC # BLD AUTO: 3.47 MILLION/UL (ref 3.81–5.12)
SODIUM SERPL-SCNC: 137 MMOL/L (ref 136–145)
WBC # BLD AUTO: 15.53 THOUSAND/UL (ref 4.31–10.16)

## 2021-06-03 PROCEDURE — 83519 RIA NONANTIBODY: CPT | Performed by: PHYSICIAN ASSISTANT

## 2021-06-03 PROCEDURE — 80053 COMPREHEN METABOLIC PANEL: CPT | Performed by: INTERNAL MEDICINE

## 2021-06-03 PROCEDURE — G1004 CDSM NDSC: HCPCS

## 2021-06-03 PROCEDURE — 99232 SBSQ HOSP IP/OBS MODERATE 35: CPT | Performed by: INTERNAL MEDICINE

## 2021-06-03 PROCEDURE — 84100 ASSAY OF PHOSPHORUS: CPT | Performed by: INTERNAL MEDICINE

## 2021-06-03 PROCEDURE — 83735 ASSAY OF MAGNESIUM: CPT | Performed by: INTERNAL MEDICINE

## 2021-06-03 PROCEDURE — 95816 EEG AWAKE AND DROWSY: CPT | Performed by: PSYCHIATRY & NEUROLOGY

## 2021-06-03 PROCEDURE — 70450 CT HEAD/BRAIN W/O DYE: CPT

## 2021-06-03 PROCEDURE — 82010 KETONE BODYS QUAN: CPT | Performed by: INTERNAL MEDICINE

## 2021-06-03 PROCEDURE — 84238 ASSAY NONENDOCRINE RECEPTOR: CPT | Performed by: PHYSICIAN ASSISTANT

## 2021-06-03 PROCEDURE — 85027 COMPLETE CBC AUTOMATED: CPT | Performed by: INTERNAL MEDICINE

## 2021-06-03 PROCEDURE — 82805 BLOOD GASES W/O2 SATURATION: CPT | Performed by: INTERNAL MEDICINE

## 2021-06-03 PROCEDURE — 99232 SBSQ HOSP IP/OBS MODERATE 35: CPT | Performed by: PSYCHIATRY & NEUROLOGY

## 2021-06-03 PROCEDURE — 92526 ORAL FUNCTION THERAPY: CPT

## 2021-06-03 RX ORDER — SODIUM BICARBONATE 650 MG/1
1300 TABLET ORAL 3 TIMES DAILY
Status: DISCONTINUED | OUTPATIENT
Start: 2021-06-03 | End: 2021-06-04

## 2021-06-03 RX ORDER — ACETAMINOPHEN 650 MG/1
650 SUPPOSITORY RECTAL EVERY 6 HOURS PRN
Status: DISCONTINUED | OUTPATIENT
Start: 2021-06-03 | End: 2021-06-06 | Stop reason: HOSPADM

## 2021-06-03 RX ORDER — AMLODIPINE BESYLATE 5 MG/1
5 TABLET ORAL 2 TIMES DAILY
Status: DISCONTINUED | OUTPATIENT
Start: 2021-06-03 | End: 2021-06-04

## 2021-06-03 RX ORDER — SODIUM CHLORIDE 1000 MG
1 TABLET, SOLUBLE MISCELLANEOUS 2 TIMES DAILY WITH MEALS
Status: DISCONTINUED | OUTPATIENT
Start: 2021-06-03 | End: 2021-06-04

## 2021-06-03 RX ADMIN — PIPERACILLIN AND TAZOBACTAM 3.38 G: 36; 4.5 INJECTION, POWDER, FOR SOLUTION INTRAVENOUS at 23:46

## 2021-06-03 RX ADMIN — AMLODIPINE BESYLATE 5 MG: 5 TABLET ORAL at 09:18

## 2021-06-03 RX ADMIN — PIPERACILLIN AND TAZOBACTAM 3.38 G: 36; 4.5 INJECTION, POWDER, FOR SOLUTION INTRAVENOUS at 11:53

## 2021-06-03 RX ADMIN — ACETAMINOPHEN 650 MG: 650 SUPPOSITORY RECTAL at 03:41

## 2021-06-03 RX ADMIN — PIPERACILLIN AND TAZOBACTAM 3.38 G: 36; 4.5 INJECTION, POWDER, FOR SOLUTION INTRAVENOUS at 05:37

## 2021-06-03 RX ADMIN — Medication 12.5 MG: at 09:18

## 2021-06-03 RX ADMIN — SODIUM BICARBONATE 650 MG TABLET 1300 MG: at 22:48

## 2021-06-03 RX ADMIN — HEPARIN SODIUM 5000 UNITS: 5000 INJECTION INTRAVENOUS; SUBCUTANEOUS at 05:38

## 2021-06-03 RX ADMIN — PIPERACILLIN AND TAZOBACTAM 3.38 G: 36; 4.5 INJECTION, POWDER, FOR SOLUTION INTRAVENOUS at 18:00

## 2021-06-03 RX ADMIN — HEPARIN SODIUM 5000 UNITS: 5000 INJECTION INTRAVENOUS; SUBCUTANEOUS at 15:30

## 2021-06-03 NOTE — SPEECH THERAPY NOTE
Speech Language/Pathology    Speech/Language Pathology Progress Note    Patient Name: Danielle Johnson  MEZDC'O Date: 6/3/2021     Problem List  Principal Problem: Witnessed seizure-like activity (HCC)  Active Problems:    Hypertension    Depression    UTI (urinary tract infection)    Hyponatremia    Chronic diastolic congestive heart failure (HCC)    Diastolic CHF, chronic (HCC)    Dementia (HCC)    Large tongue    Hypokalemia       Past Medical History  Past Medical History:   Diagnosis Date    Acute bronchopneumonia 8/3/2019    Acute encephalopathy 8/18/2017    Acute respiratory failure ([address] ) 8/3/2019    Altered mental status     Depression     Diastolic CHF, chronic (HCC)     Dysphagia, oropharyngeal phase     Essential hypertension 01/17/2017    Hypertension     Impacted cerumen of right ear 9/16/2019    Malaise 5/27/2020    Multiple thyroid nodules     Pacemaker     Rhabdomyolysis 1/17/2017    Sepsis [address] [address] ) 8/3/2019    Urinary retention     Urinary tract infection 8/18/2017        Past Surgical History  Past Surgical History:   Procedure Laterality Date    CARDIAC PACEMAKER PLACEMENT           Subjective:  Pt alert and pleasant  Speaking aprpopriately but very diffiuclt to understand  Objective:  Noted CT soft tissue neck yesterday  Seen for po potential  No drooling today, finally  Noted pt reported a "pop" and her jaw hurt  Tried to calirify w/ her  She stated several times it is not her jaw, and points to her sinuses  She is still unableto close her lips  She was unableto suck from the straw  unale to contain puree in her mouth when placed by spoon  Trialed 3 ml syringe in posterior buccal cavity/tongue  Lingual pumping and tongue thrust but appeared to swallow (laryngeal rise WEAK  ) but pt felt it went down ok  Swallows became audible w/  Few more trials, and the applesauce began to move frontally again   Suctioned pharynx which appeared to contain most, if not all of the applesauce I gave her  She does have a gag reflex  Question if ues is not relaxing, possible brainstem involvement vs other etiology  Drooling has resolved from previous days  Some meds were changed  Assessment:  ? Etiology of severe dysphagia  Needs to stay NPO  ? Short term alternate nutrition  Will continue to follow  Plan/Recommendations:  NPO  ? Alternate nutrition  Will f/u  Noted neuro reconsulted

## 2021-06-03 NOTE — PROGRESS NOTES
[address] [place] [address]  Progress Note - Sheron Macias 6/26/1929, 80 y o  female MRN: [id number]  Unit/Bed#: E5 -01 Encounter: [id number]  Primary Care Provider: Cole Lucas MD   Date and time admitted to hospital: 5/31/2021  6:50 AM    * Witnessed seizure-like activity [facility])  Assessment & Plan  · Seizure-like activity at nursing facility  Secondary to hyponatremia and UTI  · Present on admission acute metabolic encephalopathy from hyponatremia vs postictal state  · UTI:  Urine culture with MDRO  Changed to zosyn based on culture sensitivities  · Seizure precautions and appreciate neurology evaluation  EEG without epileptiform activity  · Unfortunately still having protruding tongue and dysphagia  Discussed with neurology for further workup    Hyponatremia  Assessment & Plan  · Acute on chronic hyponatremia  Was started on salt tablets as an outpatient  · 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  · Continue salt tablets for now  Was on NSS but will hold for now given CHF    Results from last 7 days   Lab Units 06/03/21  0643 06/02/21  0627 06/01/21  1249 06/01/21  0711 05/31/21  2237 05/31/21  1424 05/31/21  0702   SODIUM mmol/L 137 132* 129* 127* 126* 122* 121*       Hypokalemia  Assessment & Plan  · Repleted per nephrology    Results from last 7 days   Lab Units 06/03/21  0643 06/02/21  0627 06/01/21  0711 05/31/21  2237 05/31/21  1424 05/31/21  0702   POTASSIUM mmol/L 3 8 2 9* 3 6 3 8 5 0 4 7       Large tongue  Assessment & Plan  · Large protruding tongue initially thought secondary to encephalopathy however now mentation has improved  · CT scan without mass or obstruction  · Nephew reports this has happened every time the patient is hyponatremic  · May be beneficial for nephew to bring in her dentures from [facility])  Assessment & Plan  · Dementia without behavior disturbance    Mentation much improved given correcting hyponatremia and treating UTI    Diastolic CHF, chronic (HCC)  Assessment & Plan  Wt Readings from Last 3 Encounters:   05/31/21 70 8 kg (156 lb 1 4 oz)   11/14/19 67 6 kg (149 lb)   08/07/19 67 9 kg (149 lb 11 1 oz)     · Chronic diastolic CHF  Does have crackles bibasilar  Currently furosemide on hold due to hyponatremia  · IV fluids discontinued and respiratory status appears stable    Hypertension  Assessment & Plan  · Continue amlodipine and metoprolol if able to take pills      VTE Pharmacologic Prophylaxis: VTE Score: 3 Moderate Risk (Score 3-4) - Pharmacological DVT Prophylaxis Ordered: Heparin  Patient Centered Rounds: I have performed bedside rounds with nursing staff today  Discussions with Specialists or Other Care Team Provider:  Nephrology and neurology    Education and Discussions with Family / Patient:  Patient's nephew on telephone    Time Spent for Care: 25 mins  More than 50% of total time spent on counseling and coordination of care as described above  Current Length of Stay: 3 day(s)  Current Patient Status: Inpatient   Certification Statement: The patient will continue to require additional inpatient hospital stay due to large tongue and dysphagia  Discharge Plan / Estimated Discharge Date: Anticipate discharge in 48-72 hrs to [facility]    Code Status: Level 3 - DNAR and DNI      Subjective:   Patient seen and examined  More awake  Still having dysphagia when working with speech pathologist    Objective:   Vitals: Blood pressure 160/66, pulse 93, temperature 99 3 °F (37 4 °C), resp  rate 18, weight 70 8 kg (156 lb 1 4 oz), SpO2 96 %, not currently breastfeeding  Physical Exam  Vitals signs reviewed  Constitutional:       General: She is not in acute distress  Appearance: Normal appearance  HENT:      Head:      Comments: Still has large protruding tongue but she is able to reduce  Eyes:      General: No scleral icterus  Cardiovascular:      Rate and Rhythm: Regular rhythm        Heart sounds: Normal heart sounds  Pulmonary:      Breath sounds: Decreased breath sounds present  No wheezing  Abdominal:      General: Bowel sounds are normal       Palpations: Abdomen is soft  Tenderness: There is no guarding or rebound  Musculoskeletal:         General: No swelling  Skin:     General: Skin is warm  Neurological:      Mental Status: She is alert  Mental status is at baseline  She is disoriented     Psychiatric:         Mood and Affect: Mood normal        Additional Data:   Labs:  Results from last 7 days   Lab Units 06/03/21  0643 06/02/21  0627 06/01/21  0711 05/31/21  0702   WBC Thousand/uL 15 53* 11 04* 8 53 9 21   HEMOGLOBIN g/dL 11 2* 11 7 11 3* 11 9   HEMATOCRIT % 35 6 34 8 34 0* 34 4*   MCV fL 103* 96 97 94   PLATELETS Thousands/uL 368 322 329 346     Results from last 7 days   Lab Units 06/03/21  0643 06/02/21  0627 06/01/21  1249 06/01/21  0711 05/31/21  2237 05/31/21  1424 05/31/21  0702   SODIUM mmol/L 137 132* 129* 127* 126* 122* 121*   POTASSIUM mmol/L 3 8 2 9*  --  3 6 3 8 5 0 4 7   CHLORIDE mmol/L 102 97*  --  96* 92* 91* 87*   CO2 mmol/L 15* 18*  --  22 22 23 23   ANION GAP mmol/L 20* 17*  --  9 12 8 11   BUN mg/dL 14 14  --  9 11 11 14   CREATININE mg/dL 0 90 0 71  --  0 68 0 69 0 58* 0 89   CALCIUM mg/dL 8 9 9 0  --  8 3 8 5 8 2* 8 7   ALBUMIN g/dL 2 8* 2 8*  --  2 8*  --   --  3 2*   TOTAL BILIRUBIN mg/dL 0 68 0 60  --  0 55  --   --  0 47   ALK PHOS U/L 159* 170*  --  179*  --   --  209*   ALT U/L 30 29  --  27  --   --  32   AST U/L 50* 48*  --  36  --   --  30   EGFR ml/min/1 73sq m 56 75  --  77 76 81 57   GLUCOSE RANDOM mg/dL 100 83  --  92 111 107 127     Results from last 7 days   Lab Units 06/03/21 0643 06/02/21  0627   MAGNESIUM mg/dL 2 4 2 3   PHOSPHORUS mg/dL 2 9  --      Results from last 7 days   Lab Units 05/31/21  0702   TROPONIN I ng/mL <0 02     Results from last 7 days   Lab Units 05/31/21  0702   NT-PRO BNP pg/mL 578*      Results from last 7 days   Lab Units 06/02/21  1025   LACTIC ACID mmol/L 1 3             Results from last 7 days   Lab Units 05/31/21  0702   TSH 3RD GENERATON uIU/mL 3 093     * I Have Reviewed All Lab Data Listed Above  Cultures:   Results from last 7 days   Lab Units 05/31/21  0825   URINE CULTURE  >100,000 cfu/ml Providencia stuartii*  >100,000 cfu/ml Morganella morganii*  80,000-89,000 cfu/ml        Results from last 7 days   Lab Units 05/31/21  0800   SARS-COV-2  Negative         Lines/Drains:  Invasive Devices     Peripheral Intravenous Line            Peripheral IV 06/02/21 Left Wrist less than 1 day          Drain            Urethral Catheter Latex 18 Fr  509 days              Telemetry:      Imaging:  Imaging Reports Reviewed Today Include:   Xr Chest 2 Views    Result Date: 5/31/2021  Impression: Trace left pleural effusion  Workstation performed: [id number]     Ct Head Without Contrast    Result Date: 5/31/2021  Impression: No acute intracranial abnormality  Microangiopathic changes  Workstation performed: [id number]     Ct Soft Tissue Neck W Contrast    Result Date: 6/2/2021  Impression: No soft tissue mass or adenopathy identified  Patient's mouth is opened and the condyles are hyperintense related anterior to the condylar eminence bilaterally  Correlate for TMJ pathology   Workstation performed: [id number]     Scheduled Meds:  Current Facility-Administered Medications   Medication Dose Route Frequency Provider Last Rate    acetaminophen  650 mg Rectal Q6H PRN Clara Horner PA-C      amLODIPine  5 mg Oral BID KADE Park      heparin (porcine)  5,000 Units Subcutaneous [facility] Cliff Galloway DO      metoprolol tartrate  12 5 mg Oral Q12H [facility] Cliff Galloway DO      ondansetron  4 mg Intravenous Q4H PRN Haris Pinto DO      piperacillin-tazobactam  3 375 g Intravenous Q6H Cliff Galloway DO 3 375 g (06/03/21 1153)    sodium bicarbonate  1,300 mg Oral TID Danisha Honeycutt MD      sodium chloride  1 g Oral BID With [facility], [address] [place] [address] Internal Medicine  Hospitalist    ** Please Note: This note has been constructed using a voice recognition system   **

## 2021-06-03 NOTE — PROGRESS NOTES
NEPHROLOGY PROGRESS NOTE   Karyle Lamas 80 y o  female MRN: [id number]  Unit/Bed#: E5 -01 Encounter: [id number]  Reason for Consult: hyponatremia    PLAN:   -  Hyponatremia, correcting 5 mEq's from yesterday 137 (132,129, 127)  Continues on salt tablets, reduced to 1 g BID  Will give 250 cc of D5W today  Currently NPO    -  Gram-negative UTI, antibiotics per primary care team   -  Hypertension, continue antihypertensives as below  Will increase amlodipine to 5 mg b i d  Adriana Ruby -  Diastolic CHF, examining euvolemic, status post Lasix 6/1  May give Lasix as needed  -  Hypokalemia, will continue to monitor and replace as needed  -  Concern for seizure-like activity, ongoing workup per Neurology team   EEG pending   -  Anion gap acidosis, checking the BG  Lactic acid level normal, serum acetone level elevated  If acidotic will start bicarb supplements  ASSESSMENT/PLAN:  Hyponatremia:  suspected component of hypovolemia as well as SIADH     -  Presents with sodium level of 121   -  Na level correcting to 137 this morning, corrected from (132, 139, 127)  -  previously on NSS, now off of IVF  -  Will give 250 cc of D5W today  -  currently on sodium chloride tablets 1 g 3 times per day  Will reduce to 1 g BID    -  Received Lasix 10 mg po x 1 06/02  Continue to give as needed  -  Currently NPO  Awaiting speech to clear for diet  -  Serum osmolality 262, urine osmolality 490, urine sodium 71, TSH 3 09, a m  cortisol pending, and uric acid level 3 5   -  Baseline creatinine 0 6-0 8, creatinine currently at baseline        Seizure-like activity:  Suspected secondary to hyponatremia   - Neurology consulted, continues on seizure precautions  - CT head negative for acute intracranial abnormality   -  EEG pending      Gram-negative joyce UTI:  - [place] Prost showed large blood, positive nitrites, innumerable WBCs , RBCs, and bacteria  -  Continues on antibiotics         Chronic diastolic CHF:   Most recent echocardiogram shows EF of 70% and grade 1 diastolic dysfunction  -  Chest x-ray shows trace left pleural effusion   -  Continue daily weights, fluid restriction, I/O   -  OP diuretic: Lasix 20 mg every other day  -  May give Lasix as needed         Hypertension:    Above goal   -  Continue on amlodipine 5 mg po daily and metoprolol 12 5 mg every 12 hours    -  Will increase amlodipine to 5 mg b i d  [employer]    -  Holding parameters adjusted for SBP <130 mmHg         Hypokalemia: likely due to diuretic administration  Resolved  -  Mg level 2 4    -  Will provide ongoing replacement as needed         Anion gap acidosis: unclear etiology  -  Lactic acid level: 1 3    -  Serum acetone level: 5 1, elevated  -  Giving 250cc of D5W today  -  Checking VBG  If truly acidotic, will start bicarb replacement  -  Will discuss with Dr Howell Reasons placing on bicarb drip vs oral bicarbonate         Other: Dementia    Disposition:  Okay to discharge from [facility] once medically clear  SUBJECTIVE:  The patient is alert and interactive this morning  She is talking about her dentures although it is hard to understand clearly what she is saying  She denies any chest pain or shortness of breath  She is currently NPO  We are waiting for speech to re-evaluate  Discussed plan of care with Dr Amy Rivera      OBJECTIVE:  Current Weight: Weight - Scale: 70 8 kg (156 lb 1 4 oz)  Vitals:    06/02/21 2308 06/03/21 0848 06/03/21 0850 06/03/21 0851   BP: 140/63 160/66 160/66 160/66   BP Location:       Pulse: 87 98 94 93   Resp: 18 19 18 18   Temp: 99 8 °F (37 7 °C) 99 3 °F (37 4 °C) 99 3 °F (37 4 °C)    TempSrc:       SpO2: 93% 96% 96% 96%   Weight:           Intake/Output Summary (Last 24 hours) at 6/3/2021 0941  Last data filed at 6/3/2021 0544  Gross per 24 hour   Intake --   Output 1050 ml   Net -1050 ml     General: NAD  Skin: warm, dry, intact, no rash  HEENT: Moist mucous membranes, sclera anicteric, normocephalic, atraumatic, protruding tongue  Neck: No apparent JVD appreciated  Chest: lung sounds clear B/L, on RA   CVS:Regular rate and rhythm, no murmer   Abdomen: Soft, round, non-tender, +BS  Extremities: No B/L LE edema present  Neuro: alert  Psych: appropriate mood and affect     Medications:    Current Facility-Administered Medications:     acetaminophen (TYLENOL) rectal suppository 650 mg, 650 mg, Rectal, Q6H PRN, Tonny Alejandro PA-C, 650 mg at 06/03/21 0341    amLODIPine (NORVASC) tablet 5 mg, 5 mg, Oral, Daily, Rondal Ananya, CRNP, 5 mg at 06/03/21 8005    heparin (porcine) subcutaneous injection 5,000 Units, 5,000 Units, Subcutaneous, Q8H [address], Cliff Galloway, DO, 5,000 Units at 06/03/21 0538    metoprolol tartrate (LOPRESSOR) partial tablet 12 5 mg, 12 5 mg, Oral, Q12H [address], Cliff Galloway DO, 12 5 mg at 06/03/21 0918    ondansetron (ZOFRAN) injection 4 mg, 4 mg, Intravenous, Q4H PRN, Cliff Galloway DO    piperacillin-tazobactam (ZOSYN) 3 375 g in sodium chloride 0 9 % 100 mL IVPB, 3 375 g, Intravenous, Q6H, Cliff Galloway DO, Last Rate: 200 mL/hr at 06/03/21 0537, 3 375 g at 06/03/21 0537    sodium chloride tablet 1 g, 1 g, Oral, TID With Meals, Fernanda Hamilton MD, 1 g at 06/02/21 1809    Laboratory Results:  Results from last 7 days   Lab Units 06/03/21  0643 06/02/21  0627 06/01/21  1249 06/01/21  0711   WBC Thousand/uL 15 53* 11 04*  --  8 53   HEMOGLOBIN g/dL 11 2* 11 7  --  11 3*   HEMATOCRIT % 35 6 34 8  --  34 0*   PLATELETS Thousands/uL 368 322  --  329   SODIUM mmol/L 137 132* 129* 127*   POTASSIUM mmol/L 3 8 2 9*  --  3 6   CHLORIDE mmol/L 102 97*  --  96*   CO2 mmol/L 15* 18*  --  22   BUN mg/dL 14 14  --  9   CREATININE mg/dL 0 90 0 71  --  0 68   CALCIUM mg/dL 8 9 9 0  --  8 3   MAGNESIUM mg/dL 2 4 2 3  --   --    PHOSPHORUS mg/dL 2 9  --   --   --    ALK PHOS U/L 159* 170*  --  179*   ALT U/L 30 29  --  27   AST U/L 50* 48*  --  36

## 2021-06-03 NOTE — ASSESSMENT & PLAN NOTE
· Acute on chronic hyponatremia  Was started on salt tablets as an outpatient  · 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  · Continue salt tablets for now    Was on NSS but will hold for now given CHF    Results from last 7 days   Lab Units 06/03/21  0643 06/02/21  0627 06/01/21  1249 06/01/21  0711 05/31/21  2237 05/31/21  1424 05/31/21  0702   SODIUM mmol/L 137 132* 129* 127* 126* 122* 121*

## 2021-06-03 NOTE — PROGRESS NOTES
Progress Note - Neurology   Gloria Patel 80 y o  female MRN: [id number]  Unit/Bed#: E5 -01 Encounter: [id number]      Assessment/Plan     * Witnessed seizure-like activity ([facility] [address] )  Assessment & Plan  66-year-old female with Alzheimer's disease, chronic diastolic CHF, hypertension, recurrent hyponatremia, presenting with a brief episode of seizure-like activity followed by poorly responsive state at [facility] on 05/31       Patient found to be hyponatremic with Na 121 and have  UTI  Concern for provoked seizure in the setting of hyponatremia and UTI  CT head on admission demonstrated no acute changes  Routine EEG demonstrated diffuse slowing but no focal or epileptogenic features  Patient appears at her described baseline, oriented only to self  No reported seizure activity since admission  Plan:  -to remain off AEDs as seizure was likely provoked as noted above  -correction of metabolic/infectious derangements as per primary service/nephrology  Weakness of jaw muscles  Assessment & Plan  Patient with inability to close jaw, mouth remaining open, with large protruding tongue  She is able to move and protrude her tongue on command  She is able to open her jaw and close it slightly, but closure seems limited by pain  Symmetric soft palate elevation noted  Per Speech, patient has severe dysphagia and food pocketing  Gag reflex present per their note  Per discussion with primary team, patient's nephew reports patient previously had regular diet and good appetite and has only had difficulty with jaw closure when hyponatremic  Sodium has since been corrected  Past reported episodes of jaw closure difficulty in the setting of hyponatremia and absence of other brainstem signs besides dysphagia lowers suspicion for brainstem stroke  Will evaluate for myasthenia gravis and repeat head CT to screen for evolving stroke    Patient unable to have MRI due to reportedly MRI nonconditional ppm     Plan:  -recommend formal ENT consult to evaluate for structural pathology inhibiting patient's ability to close her jaw  TMJ suggested on CT of her mandible   -acetylcholine receptor antibody binding, blocking, modulating, and MUSK ordered  -repeat head CT pending   -consideration for trial of IV pyridostigmine if ENT evaluation unrevealing  Dementia ([facility] [address] )  Assessment & Plan  - Supportive care  - Optimize sleep/wake cycles  - Avoid CNS altering medications    Hyponatremia  Assessment & Plan  -notable hyponatremia on admission with sodium 121  -sodium 137 today  -nephrology on board    UTI (urinary tract infection)  Assessment & Plan  - currently being treated with Zosyn  Bergeron in place   - management as per primary team        Gloria Patel will not need outpatient follow up with Neurology  She will not require outpatient neurological testing  Subjective:   Patient reports pain in the left side of her jaw after yawning and hearing a pop  Patient states her jaw snaps when she attempts to close it  Jaw essentially stuck open with large protruding tongue, persistent since admission per discussion with nursing  Patient is NPO  Patient denies diplopia  Denies fatigable weakness  No seizure reported since admission  ROS:  Unable to obtain due to patient's dementia  Vitals: Blood pressure 160/66, pulse 93, temperature 99 3 °F (37 4 °C), resp  rate 18, weight 70 8 kg (156 lb 1 4 oz), SpO2 96 %, not currently breastfeeding  ,Body mass index is 25 19 kg/m²  Physical Exam:   General:  Patient is well-developed, frail-appearing, and in no acute distress  HEENT:  Head normocephalic  Eyes anicteric  Mouth is stuck open with large protruding tongue  Cardiovascular:  With regular rhythm  Lungs:  Normal effort  Nonlabored breathing  :  Bergeron in place  Extremities:  With no significant edema  Skin: No rashes  Mental Status:  Patient is alert    Unable to understand patient for the most part secondary to patient's inability to close her mouth and her large protruding tongue  Appears oriented to self only  Able to follow some simple commands but not crossed commands  Gait deferred for safety  Cranial Nerves:   II:  Bilateral blink to threat positive  Pupils 2mm equal, round, reactive to light with normal accomodation  Cannot visualize optic fundi  III,IV,VI: extraocular movements intact with no nystagmus  V: Sensation in the V1 through V3 distributions intact to light touch bilaterally  VII: Face is symmetric with no weakness noted  VIII: Audition intact to finger rub bilaterally  IX/X: Uvula midline  Soft palate elevation symmetric  XII: Tongue midline, large  Coordination:  Accurate with finger-to-nose maneuvers bilaterally  Strength at least 4/5 bilateral upper extremities and patient able to briefly lift lower extremities antigravity and wiggle toes  Knee flexion extension at least 4/5 bilaterally  No obvious lateralized weakness  Strength does not fatigue with prolonged motor testing  Sensory testing grossly intact to light touch throughout       Lab, Imaging and other studies:   CBC:   Results from last 7 days   Lab Units 06/03/21  0643 06/02/21  0627 06/01/21  0711   WBC Thousand/uL 15 53* 11 04* 8 53   RBC Million/uL 3 47* 3 61* 3 51*   HEMOGLOBIN g/dL 11 2* 11 7 11 3*   HEMATOCRIT % 35 6 34 8 34 0*   MCV fL 103* 96 97   PLATELETS Thousands/uL 368 322 329   , BMP/CMP:   Results from last 7 days   Lab Units 06/03/21  0643 06/02/21  0627 06/01/21  1249 06/01/21  0711   SODIUM mmol/L 137 132* 129* 127*   POTASSIUM mmol/L 3 8 2 9*  --  3 6   CHLORIDE mmol/L 102 97*  --  96*   CO2 mmol/L 15* 18*  --  22   BUN mg/dL 14 14  --  9   CREATININE mg/dL 0 90 0 71  --  0 68   CALCIUM mg/dL 8 9 9 0  --  8 3   AST U/L 50* 48*  --  36   ALT U/L 30 29  --  27   ALK PHOS U/L 159* 170*  --  179*   EGFR ml/min/1 73sq m 56 75  --  77   , Urinalysis: Results from last 7 days   Lab Units 05/31/21  0825   COLOR UA  Yellow   CLARITY UA  Cloudy   SPEC GRAV UA  1 015   PH UA  7 0   LEUKOCYTES UA  Large*   NITRITE UA  Positive*   GLUCOSE UA mg/dl Negative   KETONES UA mg/dl Negative   BILIRUBIN UA  Negative   BLOOD UA  Large*     VTE Prophylaxis: Sequential compression device (Venodyne)     Counseling / Coordination of Care  Total time spent today 25 minutes  Greater than 50% of total time was spent with the patient and / or family counseling and / or coordination of care  A description of the counseling / coordination of care: Discussion with primary team regarding plan to remain off AEDs  Discussed clinical circumstance with nursing

## 2021-06-03 NOTE — ASSESSMENT & PLAN NOTE
· Large protruding tongue initially thought secondary to encephalopathy however now mentation has improved  · CT scan without mass or obstruction    · Nephew reports this has happened every time the patient is hyponatremic  · May be beneficial for nephew to bring in her dentures from [facility] None

## 2021-06-03 NOTE — ASSESSMENT & PLAN NOTE
Patient with inability to close jaw, mouth remaining open, with large protruding tongue  She is able to move and protrude her tongue on command  She is able to open her jaw and close it slightly, but closure seems limited by pain  Symmetric soft palate elevation noted  Per Speech, patient has severe dysphagia and food pocketing  Gag reflex present per their note  Per discussion with primary team, patient's nephew reports patient previously had regular diet and good appetite and has only had difficulty with jaw closure when hyponatremic  Sodium has since been corrected  Unlikely myasthenia gravis  Past reported episodes of jaw closure difficulty in the setting of hyponatremia and absence of other brainstem signs besides dysphagia lowers suspicion for brainstem stroke  No evidence of stroke on imaging  CT neck unremarkable  Plan:  - ENT evaluated patient  No acute intervention or testing necessary at this time as patient is reportedly improving  If symptoms persist, ENT would recommend biopsy to rule out amyloidosis     - Patient unable to have MRI due to reportedly MRI nonconditional ppm   - Acetylcholine receptor antibody binding, blocking, modulating, and MUSK ordered

## 2021-06-03 NOTE — ASSESSMENT & PLAN NOTE
· Seizure-like activity at nursing facility  Secondary to hyponatremia and UTI  · Present on admission acute metabolic encephalopathy from hyponatremia vs postictal state  · UTI:  Urine culture with MDRO  Changed to zosyn based on culture sensitivities  · Seizure precautions and appreciate neurology evaluation  EEG without epileptiform activity  · Unfortunately still having protruding tongue and dysphagia    Discussed with neurology for further workup

## 2021-06-03 NOTE — ASSESSMENT & PLAN NOTE
· Repleted per nephrology    Results from last 7 days   Lab Units 06/03/21  0643 06/02/21  0627 06/01/21  0711 05/31/21  2237 05/31/21  1424 05/31/21  0702   POTASSIUM mmol/L 3 8 2 9* 3 6 3 8 5 0 4 7

## 2021-06-03 NOTE — NURSING NOTE
Pt called RN  Into room via call bell  Stated that she had a large amount of pain in her jaw  Said "I yawned and it cracked  Now it hurts " Spoke w/ on-call, N O  for Tylenol suppos obtained  PRN med administered  No further c/o noted

## 2021-06-04 PROBLEM — Z71.89 GOALS OF CARE, COUNSELING/DISCUSSION: Status: ACTIVE | Noted: 2021-06-04

## 2021-06-04 LAB
ANION GAP SERPL CALCULATED.3IONS-SCNC: 19 MMOL/L (ref 4–13)
BUN SERPL-MCNC: 10 MG/DL (ref 5–25)
CALCIUM SERPL-MCNC: 8.7 MG/DL (ref 8.3–10.1)
CHLORIDE SERPL-SCNC: 106 MMOL/L (ref 100–108)
CO2 SERPL-SCNC: 18 MMOL/L (ref 21–32)
CREAT SERPL-MCNC: 0.88 MG/DL (ref 0.6–1.3)
ERYTHROCYTE [DISTWIDTH] IN BLOOD BY AUTOMATED COUNT: 13.3 % (ref 11.6–15.1)
GFR SERPL CREATININE-BSD FRML MDRD: 58 ML/MIN/1.73SQ M
GLUCOSE SERPL-MCNC: 95 MG/DL (ref 65–140)
HCT VFR BLD AUTO: 34.3 % (ref 34.8–46.1)
HGB BLD-MCNC: 11.3 G/DL (ref 11.5–15.4)
LACTATE SERPL-SCNC: 1.2 MMOL/L (ref 0.5–2)
MCH RBC QN AUTO: 31.8 PG (ref 26.8–34.3)
MCHC RBC AUTO-ENTMCNC: 32.9 G/DL (ref 31.4–37.4)
MCV RBC AUTO: 97 FL (ref 82–98)
PLATELET # BLD AUTO: 406 THOUSANDS/UL (ref 149–390)
PMV BLD AUTO: 8.8 FL (ref 8.9–12.7)
POTASSIUM SERPL-SCNC: 3.1 MMOL/L (ref 3.5–5.3)
RBC # BLD AUTO: 3.55 MILLION/UL (ref 3.81–5.12)
SODIUM SERPL-SCNC: 143 MMOL/L (ref 136–145)
WBC # BLD AUTO: 8.66 THOUSAND/UL (ref 4.31–10.16)

## 2021-06-04 PROCEDURE — 99232 SBSQ HOSP IP/OBS MODERATE 35: CPT | Performed by: INTERNAL MEDICINE

## 2021-06-04 PROCEDURE — 92526 ORAL FUNCTION THERAPY: CPT

## 2021-06-04 PROCEDURE — 99232 SBSQ HOSP IP/OBS MODERATE 35: CPT | Performed by: PHYSICIAN ASSISTANT

## 2021-06-04 PROCEDURE — 83605 ASSAY OF LACTIC ACID: CPT | Performed by: INTERNAL MEDICINE

## 2021-06-04 PROCEDURE — 80048 BASIC METABOLIC PNL TOTAL CA: CPT | Performed by: INTERNAL MEDICINE

## 2021-06-04 PROCEDURE — 85027 COMPLETE CBC AUTOMATED: CPT | Performed by: INTERNAL MEDICINE

## 2021-06-04 RX ORDER — METOPROLOL TARTRATE 5 MG/5ML
2.5 INJECTION INTRAVENOUS EVERY 6 HOURS
Status: DISCONTINUED | OUTPATIENT
Start: 2021-06-04 | End: 2021-06-04

## 2021-06-04 RX ORDER — POTASSIUM CHLORIDE 14.9 MG/ML
20 INJECTION INTRAVENOUS
Status: DISCONTINUED | OUTPATIENT
Start: 2021-06-04 | End: 2021-06-04

## 2021-06-04 RX ORDER — OLANZAPINE 5 MG/1
5 TABLET, ORALLY DISINTEGRATING ORAL 2 TIMES DAILY PRN
Status: DISCONTINUED | OUTPATIENT
Start: 2021-06-04 | End: 2021-06-06 | Stop reason: HOSPADM

## 2021-06-04 RX ADMIN — PIPERACILLIN AND TAZOBACTAM 3.38 G: 36; 4.5 INJECTION, POWDER, FOR SOLUTION INTRAVENOUS at 05:19

## 2021-06-04 RX ADMIN — POTASSIUM CHLORIDE 20 MEQ: 14.9 INJECTION, SOLUTION INTRAVENOUS at 10:12

## 2021-06-04 RX ADMIN — PIPERACILLIN AND TAZOBACTAM 3.38 G: 36; 4.5 INJECTION, POWDER, FOR SOLUTION INTRAVENOUS at 12:50

## 2021-06-04 RX ADMIN — PIPERACILLIN AND TAZOBACTAM 3.38 G: 36; 4.5 INJECTION, POWDER, FOR SOLUTION INTRAVENOUS at 17:43

## 2021-06-04 RX ADMIN — HEPARIN SODIUM 5000 UNITS: 5000 INJECTION INTRAVENOUS; SUBCUTANEOUS at 05:19

## 2021-06-04 RX ADMIN — PIPERACILLIN AND TAZOBACTAM 3.38 G: 36; 4.5 INJECTION, POWDER, FOR SOLUTION INTRAVENOUS at 22:37

## 2021-06-04 RX ADMIN — METOROPROLOL TARTRATE 2.5 MG: 5 INJECTION, SOLUTION INTRAVENOUS at 12:50

## 2021-06-04 NOTE — CASE MANAGEMENT
SW was notified by ZUNILDA that pt's family would like to pursue Hospice at [facility]  Per SLIM, family spoke with [facility] and they are able to take pt with hospice agency following  SW made a referral to [facility] unable to [facility] until Thursday 6/10  A PC was made to patient's nephew Sandra Del Castillo to notify him of this  Jose Armando Nguyen reports that he would like a referral to be made to [facility] as well to see how soon they can [facility] and then he will make a decision on which hospice agency will follow patient  A referral was made to [facility] and is now under review  CM dept will continue to follow

## 2021-06-04 NOTE — PROGRESS NOTES
NEPHROLOGY PROGRESS NOTE   [place] Goodpasture 80 y o  female MRN: [id number]  Unit/Bed#: E5 -01 Encounter: [id number]  Reason for Consult:  Hyponatremia    Plan:  -Hyponatremia, corrected appropriately  Currently on salt tabs 1 g p o  B i d  Unfortunately not cleared for oral intake  Currently on NSS  -Gram-negative rods UTI, continues on antibiotics per primary care team   -hypertension, continues on amlodipine 5 mg b i d  And metoprolol   -diastolic CHF, examining euvolemic  May give Lasix as needed   -ketoacidosis, suspected starvation ketoacidosis with poor oral intake, not cleared by speech for oral intake  Primary care team to discuss with family goals of care  Currently on sodium bicarbonate 2 tablets p o  T i d   Will discontinue oral bicarbonate replacement and place on IV bicarbonate   -seizure-like activity, ongoing workup per primary care team/neurology  ASSESSMENT/PLAN:  Hyponatremia:  suspected component of hypovolemia as well as SIADH     -  Presents with sodium level of 121   -  Na level correcting to 137 this morning, corrected from (132, 139, 127)  -  currently on sodium chloride tablets, unfortunately not able to take oral intake   -  Received Lasix 10 mg po x 1 06/02  Continue to give as needed  -  Currently NPO  Not cleared for oral intake  -  Serum osmolality 262, urine osmolality 490, urine sodium 71, TSH 3 09, a m  cortisol pending, and uric acid level 3 5   -  Baseline creatinine 0 6-0 8, creatinine currently at baseline        Seizure-like activity:  Suspected secondary to hyponatremia   - Neurology consulted, continues on seizure precautions  - CT head negative for acute intracranial abnormality      Gram-negative joyce UTI:  -  UA showed large blood, positive nitrites, innumerable WBCs , RBCs, and bacteria  -  Continues on antibiotics       Chronic diastolic CHF:   Most recent echocardiogram shows EF of 70% and grade 1 diastolic dysfunction    -  Chest x-ray shows trace left pleural effusion   -  Continue daily weights, fluid restriction, I/O   -  OP diuretic: Lasix 20 mg every other day  -  May give Lasix as needed         Hypertension:    Above goal   -  Continue on amlodipine 5 mg po BID and metoprolol 12 5 mg every 12 hours    -  Will place on IV metoprolol  -  Holding parameters adjusted for SBP <130 mmHg         Hypokalemia: likely due to diuretic administration  Resolved  -  Mg level 2 4    -  Will provide ongoing replacement as needed         Anion gap acidosis:  elected secondary to starvation ketoacidosis  -  currently on oral bicarb, will discontinue placed on IV bicarb  -  Lactic acid level: 1 3    -  Serum acetone level: 5 1, elevated           Other: Dementia    Disposition:  Requiring additional stay due to medical needs  SUBJECTIVE:  The patient is resting in her bed  She is alert  It is difficult to understand her speech  Her tongue remains protruding from her mouth  She appears to be comfortable and not in apparent distress      OBJECTIVE:  Current Weight: Weight - Scale: 70 8 kg (156 lb 1 4 oz)  Vitals:    06/03/21 1538 06/03/21 2235 06/04/21 0158 06/04/21 0724   BP: 162/67 165/69 156/75 159/79   Pulse: 91 100 95 95   Resp: 19   21   Temp:       TempSrc:       SpO2: 97% 94% 93% 94%   Weight:           Intake/Output Summary (Last 24 hours) at 6/4/2021 1031  Last data filed at 6/4/2021 0601  Gross per 24 hour   Intake --   Output 1300 ml   Net -1300 ml     General: NAD  Skin: warm, dry, intact, no rash  HEENT: Moist mucous membranes, tongue protruding from mouth  Neck: No apparent JVD appreciated  Chest: lung sounds clear B/L, on RA   CVS:Regular rate and rhythm, no murmer   Abdomen: Soft, round, non-tender, +BS  Extremities: No B/L LE edema present  Neuro: alert   Psych: appropriate mood and affect     Medications:    Current Facility-Administered Medications:     acetaminophen (TYLENOL) rectal suppository 650 mg, 650 mg, Rectal, Q6H PRN, Nishant Knox PA-C, 650 mg at 06/03/21 0341    amLODIPine (NORVASC) tablet 5 mg, 5 mg, Oral, BID, KADE Camacho    heparin (porcine) subcutaneous injection 5,000 Units, 5,000 Units, Subcutaneous, Q8H [address], Fremina Shree, DO, 5,000 Units at 06/04/21 0519    metoprolol tartrate (LOPRESSOR) partial tablet 12 5 mg, 12 5 mg, Oral, Q12H [address], Cliff Galloway DO, Stopped at 06/03/21 2242    ondansetron (ZOFRAN) injection 4 mg, 4 mg, Intravenous, Q4H PRN, Cliff Galloway DO    piperacillin-tazobactam (ZOSYN) 3 375 g in sodium chloride 0 9 % 100 mL IVPB, 3 375 g, Intravenous, Q6H, Cliff Galloway DO, Last Rate: 200 mL/hr at 06/04/21 0519, 3 375 g at 06/04/21 0519    potassium chloride 20 mEq IVPB (premix), 20 mEq, Intravenous, Q2H, Patsy Butler MD, Last Rate: 50 mL/hr at 06/04/21 1012, 20 mEq at 06/04/21 1012    sodium bicarbonate tablet 1,300 mg, 1,300 mg, Oral, TID, Kathie Goff MD, 1,300 mg at 06/03/21 2248    sodium chloride tablet 1 g, 1 g, Oral, BID With Meals, KADE Camacho, Stopped at 06/03/21 1819    Laboratory Results:  Results from last 7 days   Lab Units 06/04/21  0444 06/03/21  0643 06/02/21  0627  06/01/21  0711   WBC Thousand/uL 8 66 15 53* 11 04*  --  8 53   HEMOGLOBIN g/dL 11 3* 11 2* 11 7  --  11 3*   HEMATOCRIT % 34 3* 35 6 34 8  --  34 0*   PLATELETS Thousands/uL 406* 368 322  --  329   SODIUM mmol/L 143 137 132*   < > 127*   POTASSIUM mmol/L 3 1* 3 8 2 9*  --  3 6   CHLORIDE mmol/L 106 102 97*  --  96*   CO2 mmol/L 18* 15* 18*  --  22   BUN mg/dL 10 14 14  --  9   CREATININE mg/dL 0 88 0 90 0 71  --  0 68   CALCIUM mg/dL 8 7 8 9 9 0  --  8 3   MAGNESIUM mg/dL  --  2 4 2 3  --   --    PHOSPHORUS mg/dL  --  2 9  --   --   --    ALK PHOS U/L  --  159* 170*  --  179*   ALT U/L  --  30 29  --  27   AST U/L  --  50* 48*  --  36    < > = values in this interval not displayed

## 2021-06-04 NOTE — CONSULTS
Consultation - ENT   Danielle Johnson 80 y o  female MRN: [id number]  Unit/Bed#: E5 -01 Encounter: [id number]      Assessment/Plan     Assessment:  1  Macroglossia - seems to be resolving    Plan:  I was consulted for evaluation of macroglossia in this 40-year-old female  At this point there is no reason to suspect any congenital issues  Acquired forms of macroglossia include acromegaly and amyloid  Other issues to consider would be infectious, metabolic, endocrine disorders such as hypothyroidism and diabetes, syphilis, TB, neurofibromatosis, allergic rhinitis, and venous congestion  At this point I would not recommend any further workup since this will likely resolve as it has in the past   It seems as though she has likely had significant improvement just based on review of CT scan done recently and current physical examination  However, if there is continued enlargement this could cause chronic problems such as dry mouth, difficulty swallowing, speech issues  In that case case a biopsy would be recommended to at least rule out an amyloid issue  For now good oral hygiene with water trouches and ointment such as vaseline to the lip would be advisable to prevent the dryness of mouth and fissuring of the tongue and lip dryness  History of Present Illness   Physician Requesting Consult: Josselyn Dsouza MD  Reason for Consult / Principal Problem: Macroglossia  HPI: Danielle Johnson is a 80y o  year old female who presented to the hospital on 05/31/2021 with encephalopathy and some seizure-like activity  She has a baseline history of dementia and CHF and is a poor historian  During her initial admission she was noted to have an enlarged tongue and it was deemed secondary to the encephalopathy  Her mentation has since improved but her tongue is still large  Based on review of notes in the chart her son states that each time she has this issue her tongue enlarges    In the past this seemed to have been secondary to hyponatremia  There is no history of this being a chronic issue  CT of the neck with contrast was obtained and the results were reviewed  There is no evidence of upper airway obstruction secondary to tongue hypertrophy  Consults    Review of Systems   Reason unable to perform ROS: Unable to obtain full review of systems due to underlying dementia         Historical Information   Past Medical History:   Diagnosis Date    Acute bronchopneumonia 8/3/2019    Acute encephalopathy 8/18/2017    Acute respiratory failure ([facility] [address] ) 8/3/2019    Altered mental status     Depression     Diastolic CHF, chronic (HCC)     Dysphagia, oropharyngeal phase     Essential hypertension 01/17/2017    Hypertension     Impacted cerumen of right ear 9/16/2019    Malaise 5/27/2020    Multiple thyroid nodules     Pacemaker     Rhabdomyolysis 1/17/2017    Sepsis ([facility] [address] ) 8/3/2019    Urinary retention     Urinary tract infection 8/18/2017     Past Surgical History:   Procedure Laterality Date    CARDIAC PACEMAKER PLACEMENT       Social History   Social History     Substance and Sexual Activity   Alcohol Use Never    Frequency: Never     Social History     Substance and Sexual Activity   Drug Use No     E-Cigarette/Vaping    E-Cigarette Use Never User      E-Cigarette/Vaping Substances     Social History     Tobacco Use   Smoking Status Never Smoker   Smokeless Tobacco Never Used     Family History:   Family History   Problem Relation Age of Onset    No Known Problems Mother     No Known Problems Father        Meds/Allergies   current meds:   Current Facility-Administered Medications   Medication Dose Route Frequency    acetaminophen (TYLENOL) rectal suppository 650 mg  650 mg Rectal Q6H PRN    amLODIPine (NORVASC) tablet 5 mg  5 mg Oral BID    heparin (porcine) subcutaneous injection 5,000 Units  5,000 Units Subcutaneous Q8H [facility] & long term    metoprolol tartrate (LOPRESSOR) partial tablet 12 5 mg  12 5 mg Oral Q12H [address]    ondansetron (ZOFRAN) injection 4 mg  4 mg Intravenous Q4H PRN    piperacillin-tazobactam (ZOSYN) 3 375 g in sodium chloride 0 9 % 100 mL IVPB  3 375 g Intravenous Q6H    sodium bicarbonate tablet 1,300 mg  1,300 mg Oral TID    sodium chloride tablet 1 g  1 g Oral BID With Meals    and PTA meds:   Prior to Admission Medications   Prescriptions Last Dose Informant Patient Reported? Taking?    Magnesium Hydroxide (MILK OF MAGNESIA PO)  Outside Facility (Specify) Yes Yes   Sig: Take 30 mL by mouth daily as needed   Sodium Phosphates (Enema Disposable) ENEM   Yes No   Sig: Insert 1 enema into the rectum   acetaminophen (TYLENOL) 650 mg suppository  Outside Facility (Specify) Yes Yes   Sig: Insert 650 mg into the rectum every 4 (four) hours as needed for mild pain or fever    amLODIPine (NORVASC) 2 5 mg tablet  Outside Facility (Specify) Yes Yes   Sig: Take 5 mg by mouth daily   bisacodyl (DULCOLAX) 10 mg suppository  Outside Facility (Specify) Yes Yes   Sig: Insert 10 mg into the rectum daily   ferrous sulfate 325 (65 Fe) mg tablet  Outside Facility (Specify) Yes No   Sig: Take 325 mg by mouth daily at bedtime   folic acid (FOLVITE) 1 mg tablet  Outside Facility (Specify) Yes Yes   Sig: Take 1 mg by mouth daily   furosemide (LASIX) 20 mg tablet  Outside Facility (Specify) No Yes   Sig: Take 1 tablet (20 mg total) by mouth every other day   metoprolol tartrate (LOPRESSOR) 25 mg tablet  Outside Facility (Specify) Yes Yes   Sig: Take 12 5 mg by mouth 2 (two) times a day   ondansetron (ZOFRAN) 4 mg tablet  Outside Facility (Specify) Yes Yes   Sig: Take 4 mg by mouth every 8 (eight) hours as needed for nausea or vomiting   polyethylene glycol (MIRALAX) 17 g packet  Outside Facility (Specify) No Yes   Sig: Take 17 g by mouth daily as needed (Constipation) for up to 30 days   sodium chloride 1 g tablet   Yes Yes   Sig: Take 1 g by mouth 3 (three) times a day      Facility-Administered Medications: None       No Known Allergies    Objective       Intake/Output Summary (Last 24 hours) at 6/4/2021 0747  Last data filed at 6/4/2021 0601  Gross per 24 hour   Intake --   Output 1300 ml   Net -1300 ml       Invasive Devices     Peripheral Intravenous Line            Peripheral IV 06/04/21 Dorsal (posterior); Right Forearm less than 1 day          Drain            Urethral Catheter Latex 18 Fr  510 days                Physical Exam     PHYSICAL  EXAMINATION    CONSTITUTION:    appears appropriate for age  No evidence of any acute distress  Communicates normally  Voice quality is clear  Alert and oriented  HEAD/FACE:    atraumatic, normocephalic on inspection  No scars present  Salivary glands are normal in texture and size without any asymmetry  Facial nerve function is symmetric and normal     EYES:    Extraocular muscles intact in both eyes, normal gaze bilaterally and no evidence of nystagmus  Pupils equal, round, and accommodate to light bilaterally  EARS:    External ears normal     External canals are clear and dry  Tympanic membranes intact with normal mobility, no effusion, no retraction, no perforation  Post auricular area is normal    NOSE:    External nose without deformity  Internal mucosa pink and moist     Septum midline  Nasal turbinates normal in color and size  ORAL CAVITY:    lips normal in appearance but very dry  edentulous  Gums healthy, pink and moist     Tongue appears pink and dry with some superficial fissuring - minimal protrusion from the oral cavity  Floor of mouth pink, moist, and smooth  Submandibular ducts patent with clear saliva  Parotid ducts patent with clear saliva  Oral mucosa pink and moist     Hard palate normal in appearance without any lesions  OROPHARYNX:    soft palate pink and moist without any lesions  Uvula midline without any lesions  Tonsils grade 1 bilaterally      Posterior pharynx pink and moist without any lesions    NECK:    supple and symmetric  No masses noted  Trachea midline  No thyromegaly or nodules noted  LYMPH:    No palpable adenopathy in left or right neck    SKIN:    No rashes  No lesions noted  Lab Results:   CBC:   Lab Results   Component Value Date    WBC 8 66 06/04/2021    HGB 11 3 (L) 06/04/2021    HCT 34 3 (L) 06/04/2021    MCV 97 06/04/2021     (H) 06/04/2021    MCH 31 8 06/04/2021    MCHC 32 9 06/04/2021    RDW 13 3 06/04/2021    MPV 8 8 (L) 06/04/2021   , CMP:   Lab Results   Component Value Date    SODIUM 143 06/04/2021    K 3 1 (L) 06/04/2021     06/04/2021    CO2 18 (L) 06/04/2021    BUN 10 06/04/2021    CREATININE 0 88 06/04/2021    CALCIUM 8 7 06/04/2021    EGFR 58 06/04/2021   , Coags: No results found for: PT, PTT, INR  Imaging Studies: I have personally reviewed pertinent reports  Procedure: Ct Head Wo Contrast    Result Date: 6/3/2021  Narrative: CT BRAIN - WITHOUT CONTRAST INDICATION:   Stroke, follow up Jaw closure weakness, absent gag reflex  COMPARISON:  CT 6/2/2012 TECHNIQUE:  CT examination of the brain was performed  In addition to axial images, sagittal and coronal 2D reformatted images were created and submitted for interpretation  Radiation dose length product (DLP) for this visit:  858 mGy-cm   This examination, like all CT scans performed in the [facility], was performed utilizing techniques to minimize radiation dose exposure, including the use of iterative reconstruction and automated exposure control  IMAGE QUALITY:  Diagnostic  FINDINGS: PARENCHYMA:  No intracranial mass, mass effect or midline shift  No CT signs of acute infarction  No acute parenchymal hemorrhage  Diffuse generalized volume loss, minor degree of chronic small vessel disease  Patient's clinical findings may suggest brainstem pathology, which may not be evident without MR  Unfortunately, patient has ICD which may preclude safe MR imaging    Moderate vascular calcifications VENTRICLES AND EXTRA-AXIAL SPACES:  Normal for the patient's age  VISUALIZED ORBITS AND PARANASAL SINUSES:  Bilateral lens implants CALVARIUM AND EXTRACRANIAL SOFT TISSUES:  No acute disease  Marked bilateral TMJ subluxation  Impression: Stable MR appearance of the brain  Diffuse generalized volume loss, moderate degree of chronic small vessel disease  Workstation performed: [id number]     Procedure: Ct Soft Tissue Neck W Contrast    Result Date: 6/2/2021  Narrative: CT NECK WITH CONTRAST INDICATION:   Chronic laryngitis protruding tongue  COMPARISON:  5/31/2021 TECHNIQUE:  Axial, sagittal, and coronal 2D reformatted images were created from the axial source data and submitted for interpretation  Some images were repeated due to patient motion  Radiation dose length product (DLP) for this visit:  388 mGy-cm   This examination, like all CT scans performed in the [facility], was performed utilizing techniques to minimize radiation dose exposure, including the use of iterative reconstruction and automated exposure control  IV Contrast:  85 mL of iohexol (OMNIPAQUE) IMAGE QUALITY:  Diagnostic  FINDINGS: VISUALIZED BRAIN PARENCHYMA:  No acute intracranial pathology of the visualized brain parenchyma  VISUALIZED ORBITS AND PARANASAL SINUSES:  Normal  NASAL CAVITY AND NASOPHARYNX:  Normal  SUPRAHYOID NECK:  Normal oral cavity, tongue base, tonsillar fossa and epiglottis  INFRAHYOID NECK:  Aryepiglottic folds and piriform sinuses are normal   Normal glottis and subglottic airway  THYROID GLAND:  Unremarkable  PAROTID AND SUBMANDIBULAR GLANDS:  Normal  LYMPH NODES:  No pathologic or enlarged adenopathy  VASCULAR STRUCTURES:  Mild atherosclerotic disease of the aortic arch  Mild narrowing of the proximal subclavian's bilaterally  THORACIC INLET:  Lung apices and upper mediastinum are unremarkable   BONY STRUCTURES: Osteopenia and degenerative change of the cervical spine and thoracic spine with exaggerated cervical lordosis and thoracic kyphosis  There is osteomalacia of the maxilla and mandible  The mandibular condyles are translated anterior to  the condylar eminence bilaterally  Impression: No soft tissue mass or adenopathy identified  Patient's mouth is opened and the condyles are hyperintense related anterior to the condylar eminence bilaterally  Correlate for TMJ pathology   Workstation performed: [id number]   EKG, Pathology, and Other Studies: I have personally reviewed pertinent films in PACS    Code Status: Level 3 - DNAR and DNI  Advance Directive and Living Will:      Power of :    POLST:      None

## 2021-06-04 NOTE — PROGRESS NOTES
Progress Note - Neurology   Beatrice Coreas 80 y o  female [id number]  Unit/Bed#: [facility]  /E5 -*    Assessment/Plan:    Weakness of jaw muscles  Assessment & Plan  Patient with inability to close jaw, mouth remaining open, with large protruding tongue  She is able to move and protrude her tongue on command  She is able to open her jaw and close it slightly, but closure seems limited by pain  Symmetric soft palate elevation noted  Per Speech, patient has severe dysphagia and food pocketing  Gag reflex present per their note  Per discussion with primary team, patient's nephew reports patient previously had regular diet and good appetite and has only had difficulty with jaw closure when hyponatremic  Sodium has since been corrected  Unlikely myasthenia gravis  Past reported episodes of jaw closure difficulty in the setting of hyponatremia and absence of other brainstem signs besides dysphagia lowers suspicion for brainstem stroke  No evidence of stroke on imaging  CT neck unremarkable  Plan:  - ENT evaluated patient  No acute intervention or testing necessary at this time as patient is reportedly improving  If symptoms persist, ENT would recommend biopsy to rule out amyloidosis  - Patient unable to have MRI due to reportedly MRI nonconditional ppm   - Acetylcholine receptor antibody binding, blocking, modulating, and MUSK ordered    Dementia ([facility] [address] )  Assessment & Plan  - Supportive care  - Optimize sleep/wake cycles  - Avoid CNS altering medications    Hyponatremia  Assessment & Plan  -notable hyponatremia on admission with sodium 121  -sodium 143 today  -nephrology on board    UTI (urinary tract infection)  Assessment & Plan  - currently being treated with Zosyn    Bergeron in place   - management as per primary team    * Witnessed seizure-like activity [facility])  Assessment & Plan  51-year-old female with Alzheimer's disease, chronic diastolic CHF, hypertension, recurrent hyponatremia, presenting with a brief episode of seizure-like activity followed by poorly responsive state at [facility] on 05/31       Patient found to be hyponatremic with Na 121 and have  UTI  Concern for provoked seizure in the setting of hyponatremia and UTI  CT head on admission demonstrated no acute changes  Routine EEG demonstrated diffuse slowing but no focal or epileptogenic features  Patient appears at her described baseline, oriented only to self  No reported seizure activity since admission  Plan:  - To remain off AEDs as seizure was likely provoked as noted above  - Correction of metabolic/infectious derangements as per primary service/nephrology  Recommendations for outpatient neurological follow up have yet to be determined  Subjective:   Patient was seen and examined  History was limited due to difficulty understanding the patient with her macroglossia and stiff jaw  ENT evaluated the patient today  "At this point I would not recommend any further workup since this will likely resolve as it has in the past   It seems as though she has likely had significant improvement just based on review of CT scan done recently and current physical examination  However, if there is continued enlargement this could cause chronic problems such as dry mouth, difficulty swallowing, speech issues    In that case case a biopsy would be recommended to at least rule out an amyloid issue"      Past Medical History:   Diagnosis Date    Acute bronchopneumonia 8/3/2019    Acute encephalopathy 8/18/2017    Acute respiratory failure ([facility] [address] ) 8/3/2019    Altered mental status     Depression     Diastolic CHF, chronic (HCC)     Dysphagia, oropharyngeal phase     Essential hypertension 01/17/2017    Hypertension     Impacted cerumen of right ear 9/16/2019    Malaise 5/27/2020    Multiple thyroid nodules     Pacemaker     Rhabdomyolysis 1/17/2017    Sepsis ([facility] [address] ) 8/3/2019    Urinary retention     Urinary tract infection 8/18/2017     Past Surgical History:   Procedure Laterality Date    CARDIAC PACEMAKER PLACEMENT       Family History   Problem Relation Age of Onset    No Known Problems Mother     No Known Problems Father      Social History     Socioeconomic History    Marital status: Single     Spouse name: None    Number of children: None    Years of education: None    Highest education level: None   Occupational History    None   Social Needs    Financial resource strain: None    Food insecurity     Worry: None     Inability: None    Transportation needs     Medical: None     Non-medical: None   Tobacco Use    Smoking status: Never Smoker    Smokeless tobacco: Never Used   Substance and Sexual Activity    Alcohol use: Never     Frequency: Never    Drug use: No    Sexual activity: Not Currently   Lifestyle    Physical activity     Days per week: None     Minutes per session: None    Stress: None   Relationships    Social connections     Talks on phone: None     Gets together: None     Attends Sabianist service: None     Active member of club or organization: None     Attends meetings of clubs or organizations: None     Relationship status: None    Intimate partner violence     Fear of current or ex partner: None     Emotionally abused: None     Physically abused: None     Forced sexual activity: None   Other Topics Concern    None   Social History Narrative    None         Medications:   All current active meds have been reviewed and current meds:  Scheduled Meds:  Current Facility-Administered Medications   Medication Dose Route Frequency Provider Last Rate    acetaminophen  650 mg Rectal Q6H PRN Juliann Colón PA-C      amLODIPine  5 mg Oral BID KADE Winters      heparin (porcine)  5,000 Units Subcutaneous [facility] Laya Golden DO      metoprolol  2 5 mg Intravenous Q6H KADE Winters      OLANZapine  5 mg Oral BID PRN Josselyn Dsouza MD     [facility] ondansetron  4 mg Intravenous Q4H PRN Laya Golden DO      piperacillin-tazobactam  3 375 g Intravenous Q6H Cliff Galloway DO 3 375 g (06/04/21 0519)    potassium chloride  20 mEq Intravenous Q2H Josselyn Dsouza MD 20 mEq (06/04/21 1012)    sodium bicarbonate infusion  75 mL/hr Intravenous Continuous KADE Winters       Continuous Infusions:sodium bicarbonate infusion, 75 mL/hr      PRN Meds:   acetaminophen    OLANZapine    ondansetron       ROS:   Review of Systems   Unable to perform ROS: Dementia             Vitals:   /79   Pulse 95   Temp 99 3 °F (37 4 °C)   Resp 21   Wt 70 8 kg (156 lb 1 4 oz)   LMP  (LMP Unknown)   SpO2 94%   BMI 25 19 kg/m²     Physical Exam:   Constitutional:  Elderly female, resting in bed  No acute distress  Macroglossia still present with mouth open  HENT: Normocephalic, atraumatic  Right and left external ear normal  Oropharynx dry  Tongue enlarged  Jaw open  Tender to palpation  Nose normal   Eyes: EOMs intact without nystagmus  No scleral icterus or injection  No discharge  Neck: Supple, normal ROM  Cardiovascular: Regular rate  Pulmonary: No respiratory distress  Effort normal    Musculoskeletal: Moves all extremities spontaneously  Neurological: As detailed below  Skin: Warm and dry  Psychiatric: Difficult to assess at this time  Neurologic Exam:  Mental Status: Patient is awake and alert  Difficult to understand due to inability to close mouth/macroglossia  Follows simple commands without difficulty  Cranial Nerves: Conjugate gaze present  Tracks examiner  EOMs intact  Facial sensation intact  Jaw findings as detailed above  Tongue protrudes midline but difficult to move  Motor: Moves all extremities spontaneously  Difficulty lifting legs off bed, but can maintain antigravity briefly  Sensation: Sensation to light touch intact throughout  Coordination: No obvious ataxia   Gait: Deferred for safety         Labs:  I have personally reviewed pertinent reports  Recent Results (from the past 24 hour(s))   Basic metabolic panel    Collection Time: 06/04/21  4:44 AM   Result Value Ref Range    Sodium 143 136 - 145 mmol/L    Potassium 3 1 (L) 3 5 - 5 3 mmol/L    Chloride 106 100 - 108 mmol/L    CO2 18 (L) 21 - 32 mmol/L    ANION GAP 19 (H) 4 - 13 mmol/L    BUN 10 5 - 25 mg/dL    Creatinine 0 88 0 60 - 1 30 mg/dL    Glucose 95 65 - 140 mg/dL    Calcium 8 7 8 3 - 10 1 mg/dL    eGFR 58 ml/min/1 73sq m   CBC    Collection Time: 06/04/21  4:44 AM   Result Value Ref Range    WBC 8 66 4 31 - 10 16 Thousand/uL    RBC 3 55 (L) 3 81 - 5 12 Million/uL    Hemoglobin 11 3 (L) 11 5 - 15 4 g/dL    Hematocrit 34 3 (L) 34 8 - 46 1 %    MCV 97 82 - 98 fL    MCH 31 8 26 8 - 34 3 pg    MCHC 32 9 31 4 - 37 4 g/dL    RDW 13 3 11 6 - 15 1 %    Platelets 817 (H) 985 - 390 Thousands/uL    MPV 8 8 (L) 8 9 - 12 7 fL   Lactic acid, plasma    Collection Time: 06/04/21  4:46 AM   Result Value Ref Range    LACTIC ACID 1 2 0 5 - 2 0 mmol/L       Imaging: I have personally reviewed pertinent imaging in PACS, including CT soft tissue neck,  and I have personally reviewed PACS reports  EKG, Pathology, and Other Studies: I have personally reviewed pertinent reports  VTE Prophylaxis: Heparin      Counseling / Coordination of Care  Total time spent today 20 minutes  Greater than 50% of total time was spent with the patient and/or family counseling and/or coordination of care  A description of the counseling/coordination of care:  Patient was seen and evaluated  Discussed with attending  Chart reviewed thoroughly including laboratory and imaging studies  Discussed ENT findings   Plan of care discussed with patient and primary team

## 2021-06-04 NOTE — ASSESSMENT & PLAN NOTE
Patient with underlying history of dementia presenting with altered mental status  She was found to have UTI secondary to MDRO treated with Zosyn  Slurred speech with inability to swallow, encephalopathic and able to follow commands with CT scan suggestive of severe atrophy of the brain, unable to obtain MRI  Reported seizure-like activity at the nursing home  Underlying history of dementia  Evaluation by Nephrology for hyponatremia  Evaluation by Neurology for altered mental status, dysphagia, dysarthria    Extensive workup was complete  -CT scan x2 without acute abnormalities, severe other  -patient notes from geriatrician, underlying dementia without behavioral disturbances  -UTI with multi drug-resistant organism has been treated with dose  -reported event of macroglossia evaluated by ENT, tongue of within normal limits with excessive dry mouth  -remains confused unable to follow commands  -has failed speech evaluation  -has failed to improved with proposed treatment  -neurology does not think disease CVA or myasthenia gravis  -impression of worsening dementia with acute delirium, tried Zyprexa  Discussed with family, at this time, given lack of improvement despite 5 days of treatment they will do not want to pursue any further aggressive care  No feeding tube  Patient is already DNR/DNI  Family has elected for hospice care    Patient will be transferred back to nursing home with hospice care as soon as evaluation is completed

## 2021-06-04 NOTE — CASE MANAGEMENT
SW received a call from Heather Ruiz at [facility] ([phone])  Heather Ruiz reports that she was trying to get a hold of the patient's contact Billie Garrison and was unable to talk to him or leave a message  She reports that she is unable to add patient to her list of care until she talks to Billie Garrison  Once Heather Ruiz or a [facility] hospice representative talk to Billie Garrison they can add her to their care and see her on Monday at [facility]  CM dept will contact Billie Garrison regarding this matter  CM dept will continue to follow

## 2021-06-04 NOTE — SPEECH THERAPY NOTE
Speech Language/Pathology    Speech/Language Pathology Progress Note    Patient Name: Avery Main  IHFSD'B Date: 6/4/2021     Problem List  Principal Problem: Witnessed seizure-like activity (HCC)  Active Problems:    Hypertension    Depression    UTI (urinary tract infection)    Hyponatremia    Chronic diastolic congestive heart failure (HCC)    Diastolic CHF, chronic (HCC)    Dementia (HCC)    Large tongue    Hypokalemia    Weakness of jaw muscles       Past Medical History  Past Medical History:   Diagnosis Date    Acute bronchopneumonia 8/3/2019    Acute encephalopathy 8/18/2017    Acute respiratory failure ([facility] [address] ) 8/3/2019    Altered mental status     Depression     Diastolic CHF, chronic (HCC)     Dysphagia, oropharyngeal phase     Essential hypertension 01/17/2017    Hypertension     Impacted cerumen of right ear 9/16/2019    Malaise 5/27/2020    Multiple thyroid nodules     Pacemaker     Rhabdomyolysis 1/17/2017    Sepsis ([facility] [address] ) 8/3/2019    Urinary retention     Urinary tract infection 8/18/2017        Past Surgical History  Past Surgical History:   Procedure Laterality Date    CARDIAC PACEMAKER PLACEMENT           Subjective:  Pt alert  Mouth severely dry  Lips crusted  "not now" re oral care  Then "ok go ahead"  Objective:  Pt seen for po potential and further recommendations  Mouth severely dry  Oral care completed and mouth moisturizer applied  Continues to have a strong gag but still does not have a functional oral transfer or swallow  Lingual rom appears wnl  Her tongue is not quite as anterior as it has been  She is able to close her mouth a bit more today but unable to fully closed  Denies any jaw pain or pain w/ trying to close her mouth  I applied pressure and asked her several times  She denied pain  Again stating the pain was in her cheeks (sinus area)  trialed small pieces of ice which she was unable to contain in her mouth for more than a few seconds   Looked at me and said she swallowed but i was unable to palpate any laryngeal movement  ENT consult noted   Assessment:  Remains unsafe/unableto take PO  Plan/Recommendations:  Frequent oral care w/ mouth moisturizer  ? Alternate nutrition/iv vs comfort care  Unsure of POC at this time  D/w SLIM who will speak w/ family

## 2021-06-04 NOTE — PLAN OF CARE
Problem: Potential for Falls  Goal: Patient will remain free of falls  Description: INTERVENTIONS:  - Assess patient frequently for physical needs  -  Identify cognitive and physical deficits and behaviors that affect risk of falls    -  [place] fall precautions as indicated by assessment   - Educate patient/family on patient safety including physical limitations  - Instruct patient to call for assistance with activity based on assessment  - Modify environment to reduce risk of injury  - Consider OT/PT consult to assist with strengthening/mobility  Outcome: Progressing     Problem: Prexisting or High Potential for Compromised Skin Integrity  Goal: Skin integrity is maintained or improved  Description: INTERVENTIONS:  - Identify patients at risk for skin breakdown  - Assess and monitor skin integrity  - Assess and monitor nutrition and hydration status  - Monitor labs   - Assess for incontinence   - Turn and reposition patient  - Assist with mobility/ambulation  - Relieve pressure over bony prominences  - Avoid friction and shearing  - Provide appropriate hygiene as needed including keeping skin clean and dry  - Evaluate need for skin moisturizer/barrier cream  - Collaborate with interdisciplinary team   - Patient/family teaching  - Consider wound care consult   Outcome: Progressing     Problem: NEUROSENSORY - ADULT  Goal: Achieves stable or improved neurological status  Description: INTERVENTIONS  - Monitor and report changes in neurological status  - Monitor vital signs such as temperature, blood pressure, glucose, and any other labs ordered   - Initiate measures to prevent increased intracranial pressure  - Monitor for seizure activity and implement precautions if appropriate      Outcome: Progressing  Goal: Remains free of injury related to seizures activity  Description: INTERVENTIONS  - Maintain airway, patient safety  and administer oxygen as ordered  - Monitor patient for seizure activity, document and report duration and description of seizure to physician/advanced practitioner  - If seizure occurs,  ensure patient safety during seizure  - Reorient patient post seizure  - Seizure pads on all 4 side rails  - Instruct patient/family to notify RN of any seizure activity including if an aura is experienced  - Instruct patient/family to call for assistance with activity based on nursing assessment  - Administer anti-seizure medications if ordered    Outcome: Progressing  Goal: Achieves maximal functionality and self care  Description: INTERVENTIONS  - Monitor swallowing and airway patency with patient fatigue and changes in neurological status  - Encourage and assist patient to increase activity and self care     - Encourage visually impaired, hearing impaired and aphasic patients to use assistive/communication devices  Outcome: Progressing     Problem: METABOLIC, FLUID AND ELECTROLYTES - ADULT  Goal: Electrolytes maintained within normal limits  Description: INTERVENTIONS:  - Monitor labs and assess patient for signs and symptoms of electrolyte imbalances  - Administer electrolyte replacement as ordered  - Monitor response to electrolyte replacements, including repeat lab results as appropriate  - Instruct patient on fluid and nutrition as appropriate  Outcome: Progressing  Goal: Fluid balance maintained  Description: INTERVENTIONS:  - Monitor labs   - Monitor I/O and WT  - Instruct patient on fluid and nutrition as appropriate  - Assess for signs & symptoms of volume excess or deficit  Outcome: Progressing     Problem: SKIN/TISSUE INTEGRITY - ADULT  Goal: Skin integrity remains intact  Description: INTERVENTIONS  - Identify patients at risk for skin breakdown  - Assess and monitor skin integrity  - Assess and monitor nutrition and hydration status  - Monitor labs (i e  albumin)  - Assess for incontinence   - Turn and reposition patient  - Assist with mobility/ambulation  - Relieve pressure over bony prominences  - Avoid friction and shearing  - Provide appropriate hygiene as needed including keeping skin clean and dry  - Evaluate need for skin moisturizer/barrier cream  - Collaborate with interdisciplinary team (i e  Nutrition, Rehabilitation, etc )   - Patient/family teaching  Outcome: Progressing     Problem: SAFETY ADULT  Goal: Maintain or return to baseline ADL function  Description: INTERVENTIONS:  -  Assess patient's ability to carry out ADLs; assess patient's baseline for ADL function and identify physical deficits which impact ability to perform ADLs (bathing, care of mouth/teeth, toileting, grooming, dressing, etc )  - Assess/evaluate cause of self-care deficits   - Assess range of motion  - Assess patient's mobility; develop plan if impaired  - Assess patient's need for assistive devices and provide as appropriate  - Encourage maximum independence but intervene and supervise when necessary  - Involve family in performance of ADLs  - Assess for home care needs following discharge   - Consider OT consult to assist with ADL evaluation and planning for discharge  - Provide patient education as appropriate  Outcome: Progressing  Goal: Maintain or return mobility status to optimal level  Description: INTERVENTIONS:  - Assess patient's baseline mobility status (ambulation, transfers, stairs, etc )    - Identify cognitive and physical deficits and behaviors that affect mobility  - Identify mobility aids required to assist with transfers and/or ambulation (gait belt, sit-to-stand, lift, walker, cane, etc )  - [place] fall precautions as indicated by assessment  - Record patient progress and toleration of activity level on Mobility SBAR; progress patient to next Phase/Stage  - Instruct patient to call for assistance with activity based on assessment  - Consider rehabilitation consult to assist with strengthening/weightbearing, etc   Outcome: Progressing     Problem: DISCHARGE PLANNING  Goal: Discharge to home or other facility with appropriate resources  Description: INTERVENTIONS:  - Identify barriers to discharge w/patient and caregiver  - Arrange for needed discharge resources and transportation as appropriate  - Identify discharge learning needs (meds, wound care, etc )  - Arrange for interpretive services to assist at discharge as needed  - Refer to Case Management Department for coordinating discharge planning if the patient needs post-hospital services based on physician/advanced practitioner order or complex needs related to functional status, cognitive ability, or social support system  Outcome: Progressing     Problem: Knowledge Deficit  Goal: Patient/family/caregiver demonstrates understanding of disease process, treatment plan, medications, and discharge instructions  Description: Complete learning assessment and assess knowledge base  Interventions:  - Provide teaching at level of understanding  - Provide teaching via preferred learning methods  Outcome: Progressing     Problem: Nutrition/Hydration-ADULT  Goal: Nutrient/Hydration intake appropriate for improving, restoring or maintaining nutritional needs  Description: Monitor and assess patient's nutrition/hydration status for malnutrition  Collaborate with interdisciplinary team and initiate plan and interventions as ordered  Monitor patient's weight and dietary intake as ordered or per policy  Utilize nutrition screening tool and intervene as necessary  Determine patient's food preferences and provide high-protein, high-caloric foods as appropriate       INTERVENTIONS:  - Monitor oral intake, urinary output, labs, and treatment plans  - Assess nutrition and hydration status and recommend course of action  - Evaluate amount of meals eaten  - Assist patient with eating if necessary   - Allow adequate time for meals  - Recommend/ encourage appropriate diets, oral nutritional supplements, and vitamin/mineral supplements  - Order, calculate, and assess calorie counts as needed  - Recommend, monitor, and adjust tube feedings and TPN/PPN based on assessed needs  - Assess need for intravenous fluids  - Provide specific nutrition/hydration education as appropriate  - Include patient/family/caregiver in decisions related to nutrition  Outcome: Progressing

## 2021-06-04 NOTE — PROGRESS NOTES
[facility]  Progress Note - Vin Valderrama 6/26/1929, 80 y o  female MRN: [id number]  Unit/Bed#: E5 -01 Encounter: [id number]  Primary Care Provider: Maciel Gamble MD   Date and time admitted to hospital: 5/31/2021  6:50 AM    * Goals of care, counseling/discussion  Assessment & Plan  Patient with underlying history of dementia presenting with altered mental status  She was found to have UTI secondary to MDRO treated with Zosyn  Slurred speech with inability to swallow, encephalopathic and able to follow commands with CT scan suggestive of severe atrophy of the brain, unable to obtain MRI  Reported seizure-like activity at the nursing home  Underlying history of dementia  Evaluation by Nephrology for hyponatremia  Evaluation by Neurology for altered mental status, dysphagia, dysarthria    Extensive workup was complete  -CT scan x2 without acute abnormalities, severe other  -patient notes from geriatrician, underlying dementia without behavioral disturbances  -UTI with multi drug-resistant organism has been treated with dose  -reported event of macroglossia evaluated by ENT, tongue of within normal limits with excessive dry mouth  -remains confused unable to follow commands  -has failed speech evaluation  -has failed to improved with proposed treatment  -neurology does not think disease CVA or myasthenia gravis  -impression of worsening dementia with acute delirium, tried Zyprexa  Discussed with family, at this time, given lack of improvement despite 5 days of treatment they will do not want to pursue any further aggressive care  No feeding tube  Patient is already DNR/DNI  Family has elected for hospice care    Patient will be transferred back to nursing home with hospice care as soon as evaluation is completed        VTE Pharmacologic Prophylaxis:   Pharmacologic: CMO  Mechanical VTE Prophylaxis in Place: No    Patient Centered Rounds: I have performed bedside rounds with nursing staff today  Discussions with Specialists or Other Care Team Provider:  Nephrology, Neurology    Education and Discussions with Family / Patient:  Rylee Hector    Time Spent for Care: 30 minutes  More than 50% of total time spent on counseling and coordination of care as described above  Current Length of Stay: 4 day(s)    Current Patient Status: Inpatient   Certification Statement: The patient will continue to require additional inpatient hospital stay due to Back to nursing home with hospice    Discharge Plan:  Pending hospice evaluation    Code Status: Level 4 - Comfort Care      Subjective:   Unable to obtain patient is restless and confused    Objective:     Vitals:   No data recorded  HR:  [] 92  Resp:  [19-21] 21  BP: (156-165)/(67-79) 156/76  SpO2:  [93 %-97 %] 97 %  Body mass index is 25 19 kg/m²  Input and Output Summary (last 24 hours): Intake/Output Summary (Last 24 hours) at 6/4/2021 1434  Last data filed at 6/4/2021 0601  Gross per 24 hour   Intake --   Output 1300 ml   Net -1300 ml       Physical Exam:  Agitated during the examination, unable to complete  Tongue appears to protrude in but of within normal limits the terms of dementia    Physical Exam    Additional Data:     Labs:    Results from last 7 days   Lab Units 06/04/21  0444  05/31/21  0702   WBC Thousand/uL 8 66   < > 9 21   HEMOGLOBIN g/dL 11 3*   < > 11 9   HEMATOCRIT % 34 3*   < > 34 4*   PLATELETS Thousands/uL 406*   < > 346   NEUTROS PCT %  --   --  81*   LYMPHS PCT %  --   --  10*   MONOS PCT %  --   --  8   EOS PCT %  --   --  0    < > = values in this interval not displayed       Results from last 7 days   Lab Units 06/04/21  0444 06/03/21  0643   POTASSIUM mmol/L 3 1* 3 8   CHLORIDE mmol/L 106 102   CO2 mmol/L 18* 15*   BUN mg/dL 10 14   CREATININE mg/dL 0 88 0 90   CALCIUM mg/dL 8 7 8 9   ALK PHOS U/L  --  159*   ALT U/L  --  30   AST U/L  --  50*           * I Have Reviewed All Lab Data Listed Above   * Additional Pertinent Lab Tests Reviewed: Judd 66 Admission Reviewed    Imaging:    Imaging Reports Reviewed Today Include:  Images done during hospital stay  Imaging Personally Reviewed by Myself Includes:  None    Recent Cultures (last 7 days):     Results from last 7 days   Lab Units 05/31/21  0825   URINE CULTURE  >100,000 cfu/ml Providencia stuartii*  >100,000 cfu/ml Morganella morganii*  80,000-89,000 cfu/ml        Last 24 Hours Medication List:   Current Facility-Administered Medications   Medication Dose Route Frequency Provider Last Rate    acetaminophen  650 mg Rectal Q6H PRN Juliann Colón PA-C      OLANZapine  5 mg Oral BID PRN Josselyn Dsouza MD      ondansetron  4 mg Intravenous Q4H PRN Laya Golden DO      piperacillin-tazobactam  3 375 g Intravenous Q6H Cliff Galloway DO 3 375 g (06/04/21 1250)        Today, Patient Was Seen By: Josselyn Dsouza MD    ** Please Note: Dragon 360 Dictation voice to text software may have been used in the creation of this document   **

## 2021-06-05 VITALS
HEART RATE: 102 BPM | SYSTOLIC BLOOD PRESSURE: 178 MMHG | OXYGEN SATURATION: 93 % | RESPIRATION RATE: 18 BRPM | TEMPERATURE: 99.2 F | BODY MASS INDEX: 25.19 KG/M2 | DIASTOLIC BLOOD PRESSURE: 84 MMHG | WEIGHT: 156.09 LBS

## 2021-06-05 PROCEDURE — 99239 HOSP IP/OBS DSCHRG MGMT >30: CPT | Performed by: HOSPITALIST

## 2021-06-05 RX ORDER — OLANZAPINE 5 MG/1
5 TABLET, ORALLY DISINTEGRATING ORAL 2 TIMES DAILY PRN
Qty: 20 TABLET | Refills: 0 | Status: SHIPPED | OUTPATIENT
Start: 2021-06-05

## 2021-06-05 RX ADMIN — PIPERACILLIN AND TAZOBACTAM 3.38 G: 36; 4.5 INJECTION, POWDER, FOR SOLUTION INTRAVENOUS at 04:59

## 2021-06-05 RX ADMIN — PIPERACILLIN AND TAZOBACTAM 3.38 G: 36; 4.5 INJECTION, POWDER, FOR SOLUTION INTRAVENOUS at 11:55

## 2021-06-05 NOTE — CASE MANAGEMENT
CM notified by [facility] that pt is ready to discharge back to [facility] today and pt's nephew is communicating with [facility] about [facility] date  CM contacted [facility] and spoke with the nurse supervisor, Travis Love whom stated that pt can come back on comfort care and can start with [facility] hospice when ready  CM called [facility] to schedule BLS and was provided with 10pm  CM confirmed with Travis Love that this  time is okay and she said that it is okay but send AVS ASAP  CM provided the update to Luba Carpenter and RN  CM called pt's nephew, Kel Arriola to provide updates  He also reported that he did talk to [address] [place] [address] but they said that they will call him back so he is waiting for their call  CM completed medical necessity and provided it to the RN  CM faxed the AVS to [facility] at [phone]  [facility] phone for report is [phone] and fax number is [phone]

## 2021-06-05 NOTE — TRANSPORTATION MEDICAL NECESSITY
Section I - General Information    Name of Patient: Jose Guadalupe Mi                 : 1929    Medicare #: [id number]  Transport Date: 21 (PCS is valid for round trips on this date and for all repetitive trips in the 60-day range as noted below )  Origin: [facility]                                                         Destination: [facility]   Is the pt's stay covered under Medicare Part A (PPS/DRG)   []     Closest appropriate facility? If no, why is transport to more distant facility required? Yes  If hospice pt, is this transport related to pt's terminal illness? No       Section II - Medical Necessity Questionnaire  Ambulance transportation is medically necessary only if other means of transport are contraindicated or would be potentially harmful to the patient  To meet this requirement, the patient must either be "bed confined" or suffer from a condition such that transport by means other than ambulance is contraindicated by the patient's condition  The following questions must be answered by the medical professional signing below for this form to be valid:    1)  Describe the MEDICAL CONDITION (physical and/or mental) of this patient AT [address] [place] [address] that requires the patient to be transported in an ambulance and why transport by other means is contraindicated by the patient's condition: Hyponatremia, Seizure, UTI/altered mental status, dementia, weakness, non-ambulatory, CHF, pain, going back to facility to be on hospice     2) Is the patient "bed confined" as defined below? Yes  To be "be confined" the patient must satisfy all three of the following conditions: (1) unable to get up from bed without Assistance; AND (2) unable to ambulate; AND (3) unable to sit in a chair or wheelchair  3) Can this patient safely be transported by car or wheelchair van (i e , seated during transport without a medical attendant or monitoring)? No    4) In addition to completing questions 1-3 above, please check any of the following conditions that apply*:   *Note: supporting documentation for any boxes checked must be maintained in the patient's medical records  If hosp-hosp transfer, describe services needed at 2nd facility not available at 1st facility? Patient is confused  Moderate/severe pain on movement   Medical attendant required   Special handling/isolation/infection control precautions required   Unable to tolerate seated position for time needed to transport       Section III - Signature of Physician or Healthcare Professional  I certify that the above information is true and correct based on my evaluation of this patient, and represent that the patient requires transport by ambulance and that other forms of transport are contraindicated  I understand that this information will be used by the Centers for Medicare and Medicaid Services (CMS) to support the determination of medical necessity for ambulance services, and I represent that I have personal knowledge of the patient's condition at time of transport  [x]  If this box is checked, I also certify that the patient is physically or mentally incapable of signing the ambulance service's claim and that the institution with which I am affiliated has furnished care, services, or assistance to the patient  My signature below is made on behalf of the patient pursuant to 42 CFR §424 36(b)(4)   In accordance with 42 CFR §424 37, the specific reason(s) that the patient is physically or mentally incapable of signing the claim form is as follows: N/A    Signature of Physician* or Healthcare Professional________________________________________________  Signature Date 06/05/21 (For scheduled repetitive transports, this form is not valid for transports performed more than 60 days after this date)    Printed Name & Credentials of Physician or Healthcare Professional (MD, DO, RN, etc )____Ari Joe____________________________  *Form must be signed by patient's attending physician for scheduled, repetitive transports   For non-repetitive, unscheduled ambulance transports, if unable to obtain the signature of the attending physician, any of the following may sign (choose appropriate option below)  [] Physician Assistant []  Clinical Nurse Specialist []  Registered Nurse  []  Nurse Practitioner  [x] Discharge Planner

## 2021-06-05 NOTE — ASSESSMENT & PLAN NOTE
· Patient has had 5 days of treatment for UTI  She has multidrug resistance  I would stop antibiotics at this point she is going home with hospice  It likely was fully treated at this point

## 2021-06-07 ENCOUNTER — NURSING HOME VISIT (OUTPATIENT)
Dept: GERIATRICS | Facility: OTHER | Age: 86
End: 2021-06-07
Payer: COMMERCIAL

## 2021-06-07 DIAGNOSIS — F02.80 LATE ONSET ALZHEIMER'S DISEASE WITHOUT BEHAVIORAL DISTURBANCE (HCC): Primary | ICD-10-CM

## 2021-06-07 DIAGNOSIS — R13.12 OROPHARYNGEAL DYSPHAGIA: ICD-10-CM

## 2021-06-07 DIAGNOSIS — I10 ESSENTIAL HYPERTENSION: ICD-10-CM

## 2021-06-07 DIAGNOSIS — K14.8 LARGE TONGUE: ICD-10-CM

## 2021-06-07 DIAGNOSIS — E87.1 HYPONATREMIA: ICD-10-CM

## 2021-06-07 DIAGNOSIS — R33.9 URINARY RETENTION: ICD-10-CM

## 2021-06-07 DIAGNOSIS — I50.32 CHRONIC DIASTOLIC CONGESTIVE HEART FAILURE (HCC): ICD-10-CM

## 2021-06-07 DIAGNOSIS — G30.1 LATE ONSET ALZHEIMER'S DISEASE WITHOUT BEHAVIORAL DISTURBANCE (HCC): Primary | ICD-10-CM

## 2021-06-07 PROBLEM — H61.21 IMPACTED CERUMEN OF RIGHT EAR: Status: RESOLVED | Noted: 2019-09-16 | Resolved: 2021-06-07

## 2021-06-07 PROBLEM — N39.0 UTI (URINARY TRACT INFECTION): Status: RESOLVED | Noted: 2017-08-18 | Resolved: 2021-06-07

## 2021-06-07 LAB — ACHR BIND AB SER-SCNC: 0.04 NMOL/L (ref 0–0.24)

## 2021-06-07 PROCEDURE — 99305 1ST NF CARE MODERATE MDM 35: CPT | Performed by: FAMILY MEDICINE

## 2021-06-07 RX ORDER — LORAZEPAM 2 MG/ML
0.5 CONCENTRATE ORAL EVERY 8 HOURS PRN
COMMUNITY

## 2021-06-07 RX ORDER — MORPHINE SULFATE 100 MG/5ML
5 SOLUTION ORAL EVERY 6 HOURS PRN
COMMUNITY

## 2021-06-07 NOTE — ASSESSMENT & PLAN NOTE
2nd to advanced and rapidly progressing dementia  NPO  Prognosis poor  Added comfort measures meds of prn Morphine and Ativan

## 2021-06-07 NOTE — PROGRESS NOTES
Michelle [address]  [address]  Facility: [facility]/  NAME: Nader Winkler  AGE: 80 y o  SEX: female    DATE OF ENCOUNTER: 6/7/2021    Code status:  No CPR    Assessment and Plan   Late onset Alzheimer's disease without behavioral disturbance ([facility] [address] )  Advanced with significant decline  Referral to hospice has been made  Needs NH care  Hypertension  Off po meds due to inability to swallow  BP currently stable  Will continue with monitoring  Oropharyngeal dysphagia  2nd to advanced and rapidly progressing dementia  NPO  Prognosis poor  Added comfort measures meds of prn Morphine and Ativan  Urinary retention  Due to neurogenic bladder  Bergeron in place  Chronic diastolic congestive heart failure (HCC)  No meds  Asymptomatic at this time  Large tongue  Will continue with monitoring  Hyponatremia  No further labs, will be hospice care  All medications and routine orders were reviewed and updated as needed  Plan discussed with: Nurse    Chief Complaint     Pt is not providing any info     History of Present Illness   Pt with [facility] and medical problem as this note who was admitted to [facility] [place] initially with "seizure" like activities, and confusion  Per hospital note w/u was essentially negative for any acute finding  Pt has UTI and was treated with antibiotics  Pt is unable to eat to drink, and is back here since 6/5 to continue with NH care, and to be under hospice care  Pt's all meds were discontinued at the hospital and she is back on only Tylenol  Pt is no unable to provide any info      HISTORY:  Past Medical History:   Diagnosis Date    Acute bronchopneumonia 8/3/2019    Acute encephalopathy 8/18/2017    Acute respiratory failure ([facility] [address] ) 8/3/2019    Altered mental status     Depression     Diastolic CHF, chronic (HCC)     Dysphagia, oropharyngeal phase     Essential hypertension 01/17/2017    Hypertension     Impacted cerumen of right ear 9/16/2019    Impacted cerumen of right ear 9/16/2019   Mary Oz 5/27/2020    Multiple thyroid nodules     Pacemaker     Rhabdomyolysis 1/17/2017    Sepsis ([address] ) 8/3/2019    Urinary retention     Urinary tract infection 8/18/2017    UTI (urinary tract infection) 8/18/2017     Family History   Problem Relation Age of Onset    No Known Problems Mother     No Known Problems Father      Social History     Socioeconomic History    Marital status: Single     Spouse name: None    Number of children: None    Years of education: None    Highest education level: None   Occupational History    None   Social Needs    Financial resource strain: None    Food insecurity     Worry: None     Inability: None    Transportation needs     Medical: None     Non-medical: None   Tobacco Use    Smoking status: Never Smoker    Smokeless tobacco: Never Used   Substance and Sexual Activity    Alcohol use: Never     Frequency: Never    Drug use: No    Sexual activity: Not Currently   Lifestyle    Physical activity     Days per week: None     Minutes per session: None    Stress: None   Relationships    Social connections     Talks on phone: None     Gets together: None     Attends Hoahaoism service: None     Active member of club or organization: None     Attends meetings of clubs or organizations: None     Relationship status: None    Intimate partner violence     Fear of current or ex partner: None     Emotionally abused: None     Physically abused: None     Forced sexual activity: None   Other Topics Concern    None   Social History Narrative    None       Allergies:  No Known Allergies    Review of Systems     Review of Systems   Unable to perform ROS: Dementia (??? expressive aphasia )       Medications and orders     All medications reviewed and updated in Nursing Home EMR        Objective     Vitals: wt:143 6Ibs     BP:132/76           Afebrile      Physical Exam  Vitals signs and nursing note reviewed  Constitutional:       General: She is not in acute distress  Appearance: Normal appearance  She is well-developed  She is not ill-appearing, toxic-appearing or diaphoretic  HENT:      Head: Normocephalic and atraumatic  Right Ear: External ear normal       Left Ear: External ear normal       Nose: Nose normal  No congestion or rhinorrhea  Mouth/Throat:      Pharynx: Oropharyngeal exudate: thick layer of mucus on tongut and on hard palate       Comments: No teeth, enlarged tongue  mouth open with thick mucus on tongue and hard palate  Eyes:      General: No scleral icterus  Right eye: No discharge  Left eye: No discharge  Conjunctiva/sclera: Conjunctivae normal       Pupils: Pupils are equal, round, and reactive to light  Neck:      Musculoskeletal: Normal range of motion and neck supple  No neck rigidity or muscular tenderness  Cardiovascular:      Rate and Rhythm: Normal rate and regular rhythm  Heart sounds: Normal heart sounds  No murmur  No friction rub  No gallop  Pulmonary:      Effort: Pulmonary effort is normal  No respiratory distress  Breath sounds: Normal breath sounds  No stridor  No wheezing, rhonchi or rales  Chest:      Chest wall: No tenderness  Abdominal:      General: Abdomen is flat  Bowel sounds are normal  There is no distension  Palpations: Abdomen is soft  There is no mass  Tenderness: There is no abdominal tenderness  There is no guarding or rebound  Hernia: No hernia is present  Comments: Bergeron bag to floor    Genitourinary:     Comments: Deferred  Musculoskeletal:         General: No swelling, tenderness, deformity or signs of injury  Right lower leg: No edema  Left lower leg: No edema  Comments: In bed, limited ROm  Lymphadenopathy:      Cervical: No cervical adenopathy  Skin:     General: Skin is warm and dry  Coloration: Skin is not jaundiced or pale        Findings: No bruising, erythema, lesion or rash  Comments: Didn't examine sacral area  Neurological:      Mental Status: She is alert  Cranial Nerves: Cranial nerve deficit present  Comments: Limited exam due to pt's dementia and aphagia  Psychiatric:         Behavior: Behavior normal          Pertinent Laboratory/Diagnostic Studies: The following labs/studies were reviewed please see chart or hospital paperwork for details  Ref Range & Units 6/4/21 0446    LACTIC ACID 0 5 - 2 0 mmol/L 1 2       Narrative    Result may be elevated if tourniquet was used during collection  Specimen Collected: 06/04/21 04:46   Last Resulted: 06/04/21 05:37        Ref Range & Units 6/4/21 0444    WBC 4 31 - 10 16 Thousand/uL 8 66    RBC 3 81 - 5 12 Million/uL 3 55Low     Hemoglobin 11 5 - 15 4 g/dL 11 3Low     Hematocrit 34 8 - 46 1 % 34 3Low     MCV 82 - 98 fL 97    MCH 26 8 - 34 3 pg 31 8    MCHC 31 4 - 37 4 g/dL 32 9    RDW 11 6 - 15 1 % 13 3    Platelets 810 - 690 Thousands/uL 406High     MPV 8 9 - 12 7 fL 8 8Low           Specimen Collected: 06/04/21 04:44   Last Resulted: 06/04/21 05:32        Ref Range & Units 6/4/21 0444    Sodium 136 - 145 mmol/L 143    Potassium 3 5 - 5 3 mmol/L 3 1Low     Chloride 100 - 108 mmol/L 106    CO2 21 - 32 mmol/L 18Low     ANION GAP 4 - 13 mmol/L 19High     BUN 5 - 25 mg/dL 10    Creatinine 0 60 - 1 30 mg/dL 0 88    Comment: Standardized to IDMS reference method   Glucose 65 - 140 mg/dL 95    Comment: If the patient is fasting, the ADA then defines impaired fasting glucose as > 100 mg/dL and diabetes as > or equal to 123 mg/dL  Specimen collection should occur prior to Sulfasalazine administration due to the potential for falsely depressed results  Specimen collection should occur prior to Sulfapyridine administration due to the potential for falsely elevated results     Calcium 8 3 - 10 1 mg/dL 8 7    eGFR ml/min/1 73sq m 58      AChR Binding Ab, Serum 0 00 - 0 24 nmol/L 0 04 Comment:                                Negative:   0 00 - 0 24                                  Borderline: 0 25 - 0 40                                  Positive:         >0 40      Narrative    Performed at: [facility] [address] [place]   [address]  [id number]   : Macho Carpenter MD, Phone:  [phone]      Specimen Collected: 06/03/21 12:21   Last Resulted: 06/07/21 16:05         Ref Range & Units 6/3/21 0643   Magnesium 1 6 - 2 6 mg/dL 2 4          Specimen Collected: 06/03/21 06:43   Last Resulted: 06/03/21 08:07        Ref Range & Units 6/3/21 0643    Phosphorus 2 3 - 4 1 mg/dL 2 9          Specimen Collected: 06/03/21 06:43   Last Resulted: 06/03/21 08:07        CT BRAIN - WITHOUT CONTRAST     INDICATION:   Stroke, follow up  Jaw closure weakness, absent gag reflex      COMPARISON:  CT 6/2/2012     TECHNIQUE:  CT examination of the brain was performed  In addition to axial images, sagittal and coronal 2D reformatted images were created and submitted for interpretation      Radiation dose length product (DLP) for this visit:  858 mGy-cm   This examination, like all CT scans performed in the [facility], was performed utilizing techniques to minimize radiation dose exposure, including the use of iterative   reconstruction and automated exposure control        IMAGE QUALITY:  Diagnostic      FINDINGS:     PARENCHYMA:  No intracranial mass, mass effect or midline shift  No CT signs of acute infarction  No acute parenchymal hemorrhage  Diffuse generalized volume loss, minor degree of chronic small vessel disease  Patient's clinical findings may suggest   brainstem pathology, which may not be evident without MR  Unfortunately, patient has ICD which may preclude safe MR imaging    Moderate vascular calcifications     VENTRICLES AND EXTRA-AXIAL SPACES:  Normal for the patient's age      VISUALIZED ORBITS AND PARANASAL SINUSES:  Bilateral lens implants     CALVARIUM AND EXTRACRANIAL SOFT TISSUES:  No acute disease  Marked bilateral TMJ subluxation      IMPRESSION:     Stable MR appearance of the brain  Diffuse generalized volume loss, moderate degree of chronic small vessel disease       CT NECK WITH CONTRAST     INDICATION:   Chronic laryngitis  protruding tongue      COMPARISON:  5/31/2021     TECHNIQUE:  Axial, sagittal, and coronal 2D reformatted images were created from the axial source data and submitted for interpretation  Some images were repeated due to patient motion      Radiation dose length product (DLP) for this visit:  388 mGy-cm   This examination, like all CT scans performed in the [facility], was performed utilizing techniques to minimize radiation dose exposure, including the use of iterative   reconstruction and automated exposure control      IV Contrast:  85 mL of iohexol (OMNIPAQUE)     IMAGE QUALITY:  Diagnostic      FINDINGS:     VISUALIZED BRAIN PARENCHYMA:  No acute intracranial pathology of the visualized brain parenchyma      VISUALIZED ORBITS AND PARANASAL SINUSES:  Normal      NASAL CAVITY AND NASOPHARYNX:  Normal      SUPRAHYOID NECK:  Normal oral cavity, tongue base, tonsillar fossa and epiglottis      INFRAHYOID NECK:  Aryepiglottic folds and piriform sinuses are normal   Normal glottis and subglottic airway      THYROID GLAND:  Unremarkable      PAROTID AND SUBMANDIBULAR GLANDS:  Normal      LYMPH NODES:  No pathologic or enlarged adenopathy      VASCULAR STRUCTURES:  Mild atherosclerotic disease of the aortic arch  Mild narrowing of the proximal subclavian's bilaterally      THORACIC INLET:  Lung apices and upper mediastinum are unremarkable      BONY STRUCTURES: Osteopenia and degenerative change of the cervical spine and thoracic spine with exaggerated cervical lordosis and thoracic kyphosis  There is osteomalacia of the maxilla and mandible    The mandibular condyles are translated anterior to   the condylar eminence bilaterally      IMPRESSION:     No soft tissue mass or adenopathy identified        Patient's mouth is opened and the condyles are hyperintense related anterior to the condylar eminence bilaterally  Correlate for TMJ pathology       CT BRAIN - WITHOUT CONTRAST     INDICATION:   AMS     COMPARISON:  August 18, 2017     TECHNIQUE:  CT examination of the brain was performed  In addition to axial images, sagittal and coronal 2D reformatted images were created and submitted for interpretation      Radiation dose length product (DLP) for this visit:  851 mGy-cm   This examination, like all CT scans performed in the [facility], was performed utilizing techniques to minimize radiation dose exposure, including the use of iterative   reconstruction and automated exposure control        IMAGE QUALITY:  Diagnostic      FINDINGS:     PARENCHYMA: Decreased attenuation is noted in periventricular and subcortical white matter demonstrating an appearance that is statistically most likely to represent mild microangiopathic change; this appearance is similar when compared to most recent   prior examination      No CT signs of acute infarction  No intracranial mass, mass effect or midline shift  No acute parenchymal hemorrhage      VENTRICLES AND EXTRA-AXIAL SPACES:  Normal for the patient's age      VISUALIZED ORBITS AND PARANASAL SINUSES:  Bilateral lens replacements      CALVARIUM AND EXTRACRANIAL SOFT TISSUES:  Normal      IMPRESSION:     No acute intracranial abnormality  Microangiopathic changes       CHEST      INDICATION:   altered mental      COMPARISON:  August 5, 2019     EXAM PERFORMED/VIEWS:  XR CHEST PA & LATERAL        FINDINGS:  Left-sided chest wall intracardiac device is identified  Leads are intact      Cardiomediastinal silhouette appears unremarkable      Trace left pleural effusion      Chronic compression fracture midthoracic spine unchanged from prior    Severe degenerative changes are seen in the shoulders left greater than right      IMPRESSION:     Trace left pleural effusion               Satnam Camp MD  6/7/2021 5:40 PM

## 2021-06-08 LAB — ACHR BLOCK AB/ACHR TOTAL SFR SER: 15 % (ref 0–25)

## 2021-06-09 LAB — ACHR MOD AB/ACHR TOTAL SFR SER: <12 % (ref 0–20)

## 2021-06-10 LAB — MUSK AB SER IA-ACNC: <1 U/ML

## 2022-02-11 NOTE — PROGRESS NOTES
[facility]  [address]  [phone] [address] [facility]          NAME: Agapito Andrews  AGE: 80 y o  SEX: female    DATE OF ENCOUNTER: 3/31/2021    Chief Complaint     I can hear better     History of Present Illness     HPI    The following portions of the patient's history were reviewed and updated as appropriate (from facility chart and hospital records): allergies, current medications, past family history, past medical history, past social history, past surgical history and problem list  Pt was seen and examined for f/u on right ear cerumen and hearing problem  Pt has finished debrox ear drop  Pt reports her hearing is better  And her left eye stye is feeling better and not tender any more "you can touch it now!"    Review of Systems     Review of Systems   HENT:        "it is better, I can hear voices better now!"       Active Problem List     Patient Active Problem List   Diagnosis    Elevated troponin    Status post placement of cardiac pacemaker    Urinary retention    Pacemaker    Hypertension    Depression    Late onset Alzheimer's disease without behavioral disturbance (HCC)    Elevated TSH    Hyponatremia    Other constipation    Chronic diastolic congestive heart failure (HCC)    Chronic left shoulder pain    Impacted cerumen of right ear    Localized edema    Sebaceous cyst    Hordeolum externum of left upper eyelid       Objective     Vitals: afebrile     Physical Exam  HENT:      Right Ear: Ear canal and external ear normal  There is impacted cerumen (occluding TM)  Eyes:      Comments: Left upper eyelid in middle slightly pink discoloration no tenderness  Neurological:      Comments: [place]light [place]  Pertinent Laboratory/Diagnostic Studies:  No lab for this visit     Current Medications   Medication list in facility chart was reviewed and necessary changes made         Assessment and Plan   Hordeolum externum of left upper Please inform Patient he s due for colonoscopy in 12/2022   eyelid  Improving with warm compress  Will continue with monitoring  Impacted cerumen of right ear  Will continue Debrox for 5 more days         Ender Turner MD  3/05/75201:94 PM

## 2022-07-13 NOTE — ASSESSMENT & PLAN NOTE
Attempted to call patients wife Alba back.  Due to phones being down, unable to communicate the below.    Patient needs fasting lipid panel done 2-3 days before appointment which should be covered by insurance.   Stable with Metoprolol, and Amlodipine

## 2023-09-26 NOTE — PLAN OF CARE
? Problem: Potential for Falls  Goal: Patient will remain free of falls  Description: INTERVENTIONS:  - Assess patient frequently for physical needs  -  Identify cognitive and physical deficits and behaviors that affect risk of falls    -  [place] fall precautions as indicated by assessment   - Educate patient/family on patient safety including physical limitations  - Instruct patient to call for assistance with activity based on assessment  - Modify environment to reduce risk of injury  - Consider OT/PT consult to assist with strengthening/mobility  Outcome: Completed     Problem: Prexisting or High Potential for Compromised Skin Integrity  Goal: Skin integrity is maintained or improved  Description: INTERVENTIONS:  - Identify patients at risk for skin breakdown  - Assess and monitor skin integrity  - Assess and monitor nutrition and hydration status  - Monitor labs   - Assess for incontinence   - Turn and reposition patient  - Assist with mobility/ambulation  - Relieve pressure over bony prominences  - Avoid friction and shearing  - Provide appropriate hygiene as needed including keeping skin clean and dry  - Evaluate need for skin moisturizer/barrier cream  - Collaborate with interdisciplinary team   - Patient/family teaching  - Consider wound care consult   Outcome: Completed     Problem: NEUROSENSORY - ADULT  Goal: Achieves stable or improved neurological status  Description: INTERVENTIONS  - Monitor and report changes in neurological status  - Monitor vital signs such as temperature, blood pressure, glucose, and any other labs ordered   - Initiate measures to prevent increased intracranial pressure  - Monitor for seizure activity and implement precautions if appropriate      Outcome: Completed  Goal: Remains free of injury related to seizures activity  Description: INTERVENTIONS  - Maintain airway, patient safety  and administer oxygen as ordered  - Monitor patient for seizure activity, document and report duration and description of seizure to physician/advanced practitioner  - If seizure occurs,  ensure patient safety during seizure  - Reorient patient post seizure  - Seizure pads on all 4 side rails  - Instruct patient/family to notify RN of any seizure activity including if an aura is experienced  - Instruct patient/family to call for assistance with activity based on nursing assessment  - Administer anti-seizure medications if ordered    Outcome: Completed  Goal: Achieves maximal functionality and self care  Description: INTERVENTIONS  - Monitor swallowing and airway patency with patient fatigue and changes in neurological status  - Encourage and assist patient to increase activity and self care     - Encourage visually impaired, hearing impaired and aphasic patients to use assistive/communication devices  Outcome: Completed     Problem: METABOLIC, FLUID AND ELECTROLYTES - ADULT  Goal: Electrolytes maintained within normal limits  Description: INTERVENTIONS:  - Monitor labs and assess patient for signs and symptoms of electrolyte imbalances  - Administer electrolyte replacement as ordered  - Monitor response to electrolyte replacements, including repeat lab results as appropriate  - Instruct patient on fluid and nutrition as appropriate  Outcome: Completed  Goal: Fluid balance maintained  Description: INTERVENTIONS:  - Monitor labs   - Monitor I/O and WT  - Instruct patient on fluid and nutrition as appropriate  - Assess for signs & symptoms of volume excess or deficit  Outcome: Completed     Problem: SKIN/TISSUE INTEGRITY - ADULT  Goal: Skin integrity remains intact  Description: INTERVENTIONS  - Identify patients at risk for skin breakdown  - Assess and monitor skin integrity  - Assess and monitor nutrition and hydration status  - Monitor labs (i e  albumin)  - Assess for incontinence   - Turn and reposition patient  - Assist with mobility/ambulation  - Relieve pressure over bony prominences  - Avoid friction and shearing  - Provide appropriate hygiene as needed including keeping skin clean and dry  - Evaluate need for skin moisturizer/barrier cream  - Collaborate with interdisciplinary team (i e  Nutrition, Rehabilitation, etc )   - Patient/family teaching  Outcome: Completed     Problem: SAFETY ADULT  Goal: Maintain or return to baseline ADL function  Description: INTERVENTIONS:  -  Assess patient's ability to carry out ADLs; assess patient's baseline for ADL function and identify physical deficits which impact ability to perform ADLs (bathing, care of mouth/teeth, toileting, grooming, dressing, etc )  - Assess/evaluate cause of self-care deficits   - Assess range of motion  - Assess patient's mobility; develop plan if impaired  - Assess patient's need for assistive devices and provide as appropriate  - Encourage maximum independence but intervene and supervise when necessary  - Involve family in performance of ADLs  - Assess for home care needs following discharge   - Consider OT consult to assist with ADL evaluation and planning for discharge  - Provide patient education as appropriate  Outcome: Completed  Goal: Maintain or return mobility status to optimal level  Description: INTERVENTIONS:  - Assess patient's baseline mobility status (ambulation, transfers, stairs, etc )    - Identify cognitive and physical deficits and behaviors that affect mobility  - Identify mobility aids required to assist with transfers and/or ambulation (gait belt, sit-to-stand, lift, walker, cane, etc )  - [place] fall precautions as indicated by assessment  - Record patient progress and toleration of activity level on Mobility SBAR; progress patient to next Phase/Stage  - Instruct patient to call for assistance with activity based on assessment  - Consider rehabilitation consult to assist with strengthening/weightbearing, etc   Outcome: Completed     Problem: DISCHARGE PLANNING  Goal: Discharge to home or other facility with appropriate resources  Description: INTERVENTIONS:  - Identify barriers to discharge w/patient and caregiver  - Arrange for needed discharge resources and transportation as appropriate  - Identify discharge learning needs (meds, wound care, etc )  - Arrange for interpretive services to assist at discharge as needed  - Refer to Case Management Department for coordinating discharge planning if the patient needs post-hospital services based on physician/advanced practitioner order or complex needs related to functional status, cognitive ability, or social support system  Outcome: Completed     Problem: Knowledge Deficit  Goal: Patient/family/caregiver demonstrates understanding of disease process, treatment plan, medications, and discharge instructions  Description: Complete learning assessment and assess knowledge base  Interventions:  - Provide teaching at level of understanding  - Provide teaching via preferred learning methods  Outcome: Completed     Problem: Nutrition/Hydration-ADULT  Goal: Nutrient/Hydration intake appropriate for improving, restoring or maintaining nutritional needs  Description: Monitor and assess patient's nutrition/hydration status for malnutrition  Collaborate with interdisciplinary team and initiate plan and interventions as ordered  Monitor patient's weight and dietary intake as ordered or per policy  Utilize nutrition screening tool and intervene as necessary  Determine patient's food preferences and provide high-protein, high-caloric foods as appropriate       INTERVENTIONS:  - Monitor oral intake, urinary output, labs, and treatment plans  - Assess nutrition and hydration status and recommend course of action  - Evaluate amount of meals eaten  - Assist patient with eating if necessary   - Allow adequate time for meals  - Recommend/ encourage appropriate diets, oral nutritional supplements, and vitamin/mineral supplements  - Order, calculate, and assess calorie counts as needed  - Recommend, monitor, and adjust tube feedings and TPN/PPN based on assessed needs  - Assess need for intravenous fluids  - Provide specific nutrition/hydration education as appropriate  - Include patient/family/caregiver in decisions related to nutrition  Outcome: Completed